# Patient Record
Sex: MALE | Race: WHITE | NOT HISPANIC OR LATINO | Employment: OTHER | URBAN - METROPOLITAN AREA
[De-identification: names, ages, dates, MRNs, and addresses within clinical notes are randomized per-mention and may not be internally consistent; named-entity substitution may affect disease eponyms.]

---

## 2018-06-08 RX ORDER — ASPIRIN 81 MG/1
81 TABLET ORAL DAILY
COMMUNITY
End: 2020-06-22

## 2018-06-08 RX ORDER — RANITIDINE 300 MG/1
300 TABLET ORAL NIGHTLY
COMMUNITY
End: 2020-06-22

## 2018-06-08 RX ORDER — MELOXICAM 15 MG/1
15 TABLET ORAL DAILY
COMMUNITY
End: 2018-06-09 | Stop reason: CLARIF

## 2018-06-08 RX ORDER — LISINOPRIL AND HYDROCHLOROTHIAZIDE 20; 12.5 MG/1; MG/1
1 TABLET ORAL 2 TIMES DAILY
COMMUNITY
End: 2022-05-05 | Stop reason: SDUPTHER

## 2018-06-09 ENCOUNTER — OFFICE VISIT (OUTPATIENT)
Dept: NEUROSURGERY | Facility: CLINIC | Age: 63
End: 2018-06-09

## 2018-06-09 VITALS
DIASTOLIC BLOOD PRESSURE: 80 MMHG | HEIGHT: 73 IN | TEMPERATURE: 98.3 F | SYSTOLIC BLOOD PRESSURE: 110 MMHG | BODY MASS INDEX: 33.53 KG/M2 | WEIGHT: 253 LBS

## 2018-06-09 DIAGNOSIS — M51.36 DEGENERATIVE DISC DISEASE, LUMBAR: Primary | ICD-10-CM

## 2018-06-09 DIAGNOSIS — R20.0 NUMBNESS AND TINGLING: ICD-10-CM

## 2018-06-09 DIAGNOSIS — M50.30 DEGENERATIVE DISC DISEASE, CERVICAL: ICD-10-CM

## 2018-06-09 DIAGNOSIS — M51.26 HERNIATED LUMBAR INTERVERTEBRAL DISC: ICD-10-CM

## 2018-06-09 DIAGNOSIS — R20.2 NUMBNESS AND TINGLING: ICD-10-CM

## 2018-06-09 PROBLEM — M51.369 DEGENERATIVE DISC DISEASE, LUMBAR: Status: ACTIVE | Noted: 2018-06-09

## 2018-06-09 PROCEDURE — 99243 OFF/OP CNSLTJ NEW/EST LOW 30: CPT | Performed by: NEUROLOGICAL SURGERY

## 2018-06-09 RX ORDER — NABUMETONE 750 MG/1
750 TABLET, FILM COATED ORAL 2 TIMES DAILY
Qty: 60 TABLET | Refills: 0 | Status: SHIPPED | OUTPATIENT
Start: 2018-06-09 | End: 2020-06-22

## 2018-06-09 RX ORDER — METHOCARBAMOL 750 MG/1
750 TABLET, FILM COATED ORAL NIGHTLY
Qty: 30 TABLET | Refills: 0 | Status: SHIPPED | OUTPATIENT
Start: 2018-06-09 | End: 2020-06-22

## 2018-06-09 NOTE — PATIENT INSTRUCTIONS
After seeing Dr. Terry for injections and attending physical therapy, call Dr. Quiros on a Monday or Tuesday with an update.   Ask for Zahida,  and leave a message for  Dr. Quiros.  He will call you back at the end of the day as soon as he can.     [phone]

## 2018-06-09 NOTE — PROGRESS NOTES
Behzad [facility]  1955  [id number]      Chief Complaint   Patient presents with   • Back Pain       HISTORY OF PRESENT ILLNESS:  [This is a 62-year-old male seen with a chief complaint of numbness, weakness in his legs; numbness rating of both R May of 5 months duration.  He has a rather protracted history of chronic low back pain however his symptoms have worsened in the past 5 months.  They have been refractory to chiropractic shin.  Lumbar MRIs been performed is referred for neurosurgical consultation.  The symptoms appear to be a bit worse on his left than the right of the lower extremity.  The numbness and tingling involving both of his upper extremity primarily in the distribution of the ulnar nerve. ]    Past Medical History:   Diagnosis Date   • Bronchitis    • Melanoma        Past Surgical History:   Procedure Laterality Date   • SKIN CANCER EXCISION         History reviewed. No pertinent family history.    Social History     Social History   • Marital status:      Spouse name: N/A   • Number of children: N/A   • Years of education: N/A     Occupational History   • Not on file.     Social History Main Topics   • Smoking status: Never Smoker   • Smokeless tobacco: Not on file   • Alcohol use Yes   • Drug use: No   • Sexual activity: Defer     Other Topics Concern   • Not on file     Social History Narrative   • No narrative on file       Allergies   Allergen Reactions   • Protonix [Pantoprazole] Rash   • Sulfa Antibiotics Rash         Current Outpatient Prescriptions:   •  aspirin 81 MG EC tablet, Take 81 mg by mouth Daily., Disp: , Rfl:   •  fluticasone (VERAMYST) 27.5 MCG/SPRAY nasal spray, 2 sprays into each nostril Daily., Disp: , Rfl:   •  lisinopril-hydrochlorothiazide (PRINZIDE,ZESTORETIC) 20-12.5 MG per tablet, Take 1 tablet by mouth Daily., Disp: , Rfl:   •  meloxicam (MOBIC) 15 MG tablet, Take 15 mg by mouth Daily., Disp: , Rfl:   •  raNITIdine (ZANTAC) 300 MG tablet, Take 300 mg by  "mouth Every Night., Disp: , Rfl:     Review of Systems   Constitutional: Positive for activity change. Negative for appetite change, chills, diaphoresis, fatigue, fever and unexpected weight change.   HENT: Negative for congestion, dental problem, drooling, ear discharge, ear pain, facial swelling, hearing loss, mouth sores, nosebleeds, postnasal drip, rhinorrhea, sinus pressure, sneezing, sore throat, tinnitus, trouble swallowing and voice change.    Eyes: Negative for photophobia, pain, discharge, redness, itching and visual disturbance.   Respiratory: Negative for apnea, cough, choking, chest tightness, shortness of breath, wheezing and stridor.    Cardiovascular: Negative for chest pain, palpitations and leg swelling.   Gastrointestinal: Negative for abdominal distention, abdominal pain, anal bleeding, blood in stool, constipation, diarrhea, nausea, rectal pain and vomiting.   Musculoskeletal: Positive for back pain and gait problem. Negative for arthralgias, joint swelling, myalgias, neck pain and neck stiffness.   Skin: Negative for color change, pallor, rash and wound.   Allergic/Immunologic: Negative for environmental allergies, food allergies and immunocompromised state.   Neurological: Positive for weakness and numbness. Negative for dizziness, tremors, seizures, syncope, facial asymmetry, speech difficulty, light-headedness and headaches.   Hematological: Negative for adenopathy. Does not bruise/bleed easily.   Psychiatric/Behavioral: Negative for agitation, behavioral problems, confusion, decreased concentration, dysphoric mood, self-injury, sleep disturbance and suicidal ideas. The patient is not nervous/anxious and is not hyperactive.        Vitals:    06/09/18 0921   BP: 110/80   Temp: 98.3 °F (36.8 °C)   Weight: 115 kg (253 lb)   Height: 185.4 cm (73\")       Neurological Examination:    Mental status/speech: The patient is alert and oriented.  Speech is clear without aphysia or dysarthria.  No overt " cognitive deficits.    Cranial nerve examination:    Olfaction: Smell is intact.  Vision: Vision is intact without visual field abnormalities.  Funduscopic examination is normal.  No pupillary irregularity.  Ocular motor examination: The extraocular muscles are intact.  There is no diplopia.  The pupil is round and reactive to both light and accommodation.  There is no nystagmus.  Facial movement/sensation: There is no facial weakness.  Sensation is intact in the first, second, and third divisions of the trigeminal nerve.  The corneal reflex is intact.  Auditory: Hearing is intact to finger rub bilaterally.  Cranial nerves IX, X, XI, XII: Phonation is normal.  No dysphagia.  Tongue is protruded in the midline without atrophy.  The gag reflex is intact.  Shoulder shrug is normal.    Musculoligamentous ligamentous examination: He has limitation or range of motion of the cervical and lumbar area.  Straight leg raising, [place] again Derek's test are negative.  His strength is intact without weakness or sensory loss.  The left patellar reflex is diminished as compared to the right.  There is no Babinski Kennedy or clonus.  He has negative Tinel at both elbows.  He is hyperreflexic in the upper extremities.    Medical Decision Making:     Diagnostic Data Set:  Lumbar MRI shows what appears to be an extruded disc herniation at L3-L4 on the left side.      Assessment:  Symptomatic disc herniation L3-L4, left.          Recommendations:  There are 2 issues at hand.  The first being that of back and left leg pain which I believe is secondary to the disc herniation noted at L3-L4.  I would recommend physical therapy and epidural steroid injection for this to see if we can get this better without surgical intervention.  I have also given him a prescription of Relafen 750 mg twice a day and Robaxin 750 mg at night.  The second issue is that of pain in the cervical region with paresthesia in both of his upper extremities.  His  symptoms certainly sound like he has nerve root entrapment.  I have ordered cervical MRI as well as EMG/NCV of both upper extremities.  I will see him on the same day.  I do not believe that the issue has in the lumbar area provide any insight into those symptoms involved in his neck which I think are completely separate.        I greatly appreciate the opportunity to see and evaluate this individual.  If you have questions or concerns regarding issues that I may have overlooked please call me at any time: [phone].  Jareth Quiros M.D.  [facility]  [address] [place] [address]    Scribed for Saran Quiros MD by Catherine Howard CMA. 6/9/2018  9:46 AM     I have read and concur with the information provided by the scribe.  Saran Quiros MD

## 2018-06-21 ENCOUNTER — OFFICE VISIT (OUTPATIENT)
Dept: NEUROSURGERY | Facility: CLINIC | Age: 63
End: 2018-06-21

## 2018-06-21 ENCOUNTER — HOSPITAL ENCOUNTER (OUTPATIENT)
Dept: MRI IMAGING | Facility: HOSPITAL | Age: 63
Discharge: HOME | End: 2018-06-21
Attending: NEUROLOGICAL SURGERY | Admitting: NEUROLOGICAL SURGERY

## 2018-06-21 VITALS
SYSTOLIC BLOOD PRESSURE: 140 MMHG | BODY MASS INDEX: 34.01 KG/M2 | HEIGHT: 73 IN | WEIGHT: 256.6 LBS | DIASTOLIC BLOOD PRESSURE: 90 MMHG | TEMPERATURE: 97.4 F

## 2018-06-21 DIAGNOSIS — G56.03 BILATERAL CARPAL TUNNEL SYNDROME: Primary | ICD-10-CM

## 2018-06-21 DIAGNOSIS — R20.2 NUMBNESS AND TINGLING: ICD-10-CM

## 2018-06-21 DIAGNOSIS — R20.0 NUMBNESS AND TINGLING: ICD-10-CM

## 2018-06-21 DIAGNOSIS — M50.30 BULGING OF CERVICAL INTERVERTEBRAL DISC: ICD-10-CM

## 2018-06-21 PROCEDURE — 99213 OFFICE O/P EST LOW 20 MIN: CPT | Performed by: NEUROLOGICAL SURGERY

## 2018-06-21 PROCEDURE — 72156 MRI NECK SPINE W/O & W/DYE: CPT

## 2018-06-21 PROCEDURE — 0 GADOBENATE DIMEGLUMINE 529 MG/ML SOLUTION: Performed by: NEUROLOGICAL SURGERY

## 2018-06-21 PROCEDURE — 82565 ASSAY OF CREATININE: CPT

## 2018-06-21 PROCEDURE — A9577 INJ MULTIHANCE: HCPCS | Performed by: NEUROLOGICAL SURGERY

## 2018-06-21 RX ADMIN — GADOBENATE DIMEGLUMINE 20 ML: 529 INJECTION, SOLUTION INTRAVENOUS at 09:45

## 2018-06-21 NOTE — PROGRESS NOTES
Behzad TOURE [facility]  1955  [id number]                       CURRENT WORKING DIAGNOSIS:  [Cervical spondylosis ]         MEDICAL HISTORY SINCE LAST ENCOUNTER:  [This 62-year-old male has tingling and paresthesia in both of his hands but also has neck pain.  He is being treated the present time for pain of the lumbar area with physical therapy showing somewhat improvement.  Worse now for follow-up discussion of his studies that were prompted as a result of his initial encounter. ]           Past Medical History:   Diagnosis Date   • Bronchitis    • Melanoma               Past Surgical History:   Procedure Laterality Date   • SKIN CANCER EXCISION              History reviewed. No pertinent family history.           Social History     Social History   • Marital status:      Spouse name: N/A   • Number of children: N/A   • Years of education: N/A     Occupational History   • Not on file.     Social History Main Topics   • Smoking status: Never Smoker   • Smokeless tobacco: Not on file   • Alcohol use Yes   • Drug use: No   • Sexual activity: Defer     Other Topics Concern   • Not on file     Social History Narrative   • No narrative on file              Allergies   Allergen Reactions   • Protonix [Pantoprazole] Rash   • Sulfa Antibiotics Rash              Current Outpatient Prescriptions:   •  aspirin 81 MG EC tablet, Take 81 mg by mouth Daily., Disp: , Rfl:   •  fluticasone (VERAMYST) 27.5 MCG/SPRAY nasal spray, 2 sprays into each nostril Daily., Disp: , Rfl:   •  lisinopril-hydrochlorothiazide (PRINZIDE,ZESTORETIC) 20-12.5 MG per tablet, Take 1 tablet by mouth Daily., Disp: , Rfl:   •  methocarbamol (ROBAXIN) 750 MG tablet, Take 1 tablet by mouth Every Night., Disp: 30 tablet, Rfl: 0  •  nabumetone (RELAFEN) 750 MG tablet, Take 1 tablet by mouth 2 (Two) Times a Day., Disp: 60 tablet, Rfl: 0  •  raNITIdine (ZANTAC) 300 MG tablet, Take 300 mg by mouth Every Night., Disp: , Rfl:   No current  LMTCB see telephone encounter. ------    Notes Recorded by Juaquin Bamberger, MD on 7/1/2017 at 12:35 PM CDT  Notify pt; her labs shows he a1c at 7.8 so she is now diabetic. Have pt ffup with me in clinic to further discuss treatment.   Her thyroid test facility-administered medications for this visit.          Review of Systems   Constitutional: Positive for activity change. Negative for appetite change, chills, diaphoresis, fatigue, fever and unexpected weight change.   HENT: Negative for congestion, dental problem, drooling, ear discharge, ear pain, facial swelling, hearing loss, mouth sores, nosebleeds, postnasal drip, rhinorrhea, sinus pain, sneezing, sore throat, tinnitus, trouble swallowing and voice change.    Eyes: Negative for photophobia, pain, discharge, redness, itching and visual disturbance.   Respiratory: Negative for apnea, cough, choking, chest tightness, shortness of breath, wheezing and stridor.    Cardiovascular: Negative for chest pain, palpitations and leg swelling.   Gastrointestinal: Negative for abdominal distention, abdominal pain, anal bleeding, blood in stool, constipation, diarrhea, nausea, rectal pain and vomiting.   Endocrine: Negative for cold intolerance, heat intolerance, polydipsia, polyphagia and polyuria.   Genitourinary: Negative for decreased urine volume, difficulty urinating, discharge, dysuria, enuresis, flank pain, frequency, genital sores, hematuria, penile pain, penile swelling, scrotal swelling, testicular pain and urgency.   Musculoskeletal: Positive for back pain and gait problem. Negative for arthralgias, joint swelling, myalgias, neck pain and neck stiffness.   Skin: Negative for color change, pallor, rash and wound.   Allergic/Immunologic: Negative for environmental allergies, food allergies and immunocompromised state.   Neurological: Positive for weakness and numbness. Negative for dizziness, tremors, seizures, syncope, facial asymmetry, speech difficulty, light-headedness and headaches.   Hematological: Negative for adenopathy. Does not bruise/bleed easily.   Psychiatric/Behavioral: Negative for agitation, behavioral problems, confusion, decreased concentration, dysphoric mood, hallucinations, self-injury,  "sleep disturbance and suicidal ideas. The patient is not nervous/anxious and is not hyperactive.              There were no vitals filed for this visit.            EXAMINATION: Diminished range of motion of cervical spine without weakness sensory loss or reflex asymmetry.            MEDICAL DECISION MAKING: The EMG and NCV shows advanced and severe median neuropathy at the left wrist more so than the right.  The EMG is apparently normal showing no evidence of radiculopathy.  However, the MRI shows disc protrusion with bilateral deviation at C5-C6.           ASSESSMENT/DISPOSITION: Cervical spondylosis; bilateral median neuropathy both diagnostic possibilities to explain his symptoms.  I've suggested he continue with his physical therapy and he will call me afterward.  He is doing \"okay\" I would leave him alone to do nothing else.  If he is still symptomatic I think we would have to decompress the right median nerve at the wrist prior to anterior cervical decompression and fusion.  EMG/NCV is very convincing.  I will follow through this, however, and keep you informed.              I APPRECIATE THE OPPORTUNITY OF THIS REFERRAL. PLEASE CALL IF ANY       QUESTIONS [phone]    Scribed for Saran Quiros MD by Catherine Howard CMA. 6/21/2018  1:53 PM    I have read and concur with the information provided by the scribe.  Saran Quiros MD    "

## 2018-06-21 NOTE — PATIENT INSTRUCTIONS
In 2-3 weeks, call Dr. Quiros on a Monday or Tuesday with an update.   Ask for Zahida,  and leave a message for  Dr. Quiros.  He will call you back at the end of the day as soon as he can.     [phone]

## 2018-06-24 LAB — CREAT BLDA-MCNC: 1.1 MG/DL (ref 0.6–1.3)

## 2018-07-26 ENCOUNTER — TELEPHONE (OUTPATIENT)
Dept: NEUROSURGERY | Facility: CLINIC | Age: 63
End: 2018-07-26

## 2018-07-26 NOTE — TELEPHONE ENCOUNTER
----- Message from Zahida Tatum sent at 7/26/2018  1:01 PM EDT -----  Contact: [phone]  PATIENT CALLING TO REPORT ON HIS CONDITION.  ARMS TINGLING IS BETTER; LUMBAR IS THE SAME.  SCHEDULED FOR AN INJECTION 8/1/18.

## 2018-08-07 ENCOUNTER — TELEPHONE (OUTPATIENT)
Dept: NEUROSURGERY | Facility: CLINIC | Age: 63
End: 2018-08-07

## 2018-08-07 NOTE — TELEPHONE ENCOUNTER
----- Message from Zahida Tatum sent at 8/6/2018 10:35 AM EDT -----  Contact: [phone]  PATIENT CALLING TO REPORT ON HIS CONDITION.  STATES HE HAS SEEN DR. PAYNE, BUT HAS NOT HAD INJECTION AS OF YET.  ALSO, WOULD LIKE TO SPEAK WITH YOU CONCERNING CARPAL TUNNEL SURGERY.

## 2018-08-20 ENCOUNTER — TELEPHONE (OUTPATIENT)
Dept: NEUROSURGERY | Facility: CLINIC | Age: 63
End: 2018-08-20

## 2018-08-20 NOTE — TELEPHONE ENCOUNTER
----- Message from Zahida Tatum sent at 8/20/2018 11:53 AM EDT -----  Contact: [phone]  PATIENT CALLING TO REPORT ON HIS CONDITION.  STATES HE HAD INJECTION 8/8/18 AND IT HELPED A GREAT DEAL FIRST 4 DAYS, BUT NOW NOT AS MUCH.  STILL IN PT.  AND FEELS IT IS HELPING.

## 2019-01-14 ENCOUNTER — TELEPHONE (OUTPATIENT)
Dept: NEUROSURGERY | Facility: CLINIC | Age: 64
End: 2019-01-14

## 2019-01-14 NOTE — TELEPHONE ENCOUNTER
----- Message from Zahida Tatum sent at 1/14/2019 12:42 PM EST -----  Contact: [phone]  PATIENT CALLING TO REPORT ON HIS CONDITION.  STATES HE HAS HAD HIS 2ND EPIDURAL INJECTION IN November AND IT SEEMS TO HAVE HELPED HIM.  DID HAVE SOME PROBLEMS INBETWEEN INJECTIONS WITH NUMBNESS AND BURNING IN HIS LEFT KNEE ON THE SIDE.

## 2019-09-20 ENCOUNTER — TRANSCRIBE ORDERS (OUTPATIENT)
Dept: PULMONOLOGY | Facility: HOSPITAL | Age: 64
End: 2019-09-20

## 2019-09-20 DIAGNOSIS — G47.33 OBSTRUCTIVE SLEEP APNEA (ADULT) (PEDIATRIC): Primary | ICD-10-CM

## 2019-10-11 ENCOUNTER — HOSPITAL ENCOUNTER (OUTPATIENT)
Dept: SLEEP MEDICINE | Facility: HOSPITAL | Age: 64
Discharge: HOME | End: 2019-10-11
Admitting: INTERNAL MEDICINE

## 2019-10-11 VITALS
SYSTOLIC BLOOD PRESSURE: 142 MMHG | WEIGHT: 255.6 LBS | HEIGHT: 73 IN | DIASTOLIC BLOOD PRESSURE: 67 MMHG | BODY MASS INDEX: 33.88 KG/M2 | HEART RATE: 64 BPM | OXYGEN SATURATION: 96 %

## 2019-10-11 DIAGNOSIS — G47.33 OBSTRUCTIVE SLEEP APNEA (ADULT) (PEDIATRIC): ICD-10-CM

## 2019-10-11 PROCEDURE — 95806 SLEEP STUDY UNATT&RESP EFFT: CPT

## 2019-10-11 RX ORDER — ATORVASTATIN CALCIUM 10 MG/1
20 TABLET, FILM COATED ORAL NIGHTLY
COMMUNITY
End: 2021-02-25

## 2020-05-20 ENCOUNTER — TRANSCRIBE ORDERS (OUTPATIENT)
Dept: ADMINISTRATIVE | Facility: HOSPITAL | Age: 65
End: 2020-05-20

## 2020-05-20 ENCOUNTER — HOSPITAL ENCOUNTER (OUTPATIENT)
Dept: GENERAL RADIOLOGY | Facility: HOSPITAL | Age: 65
Discharge: HOME | End: 2020-05-20
Admitting: ANESTHESIOLOGY

## 2020-05-20 DIAGNOSIS — M43.10 SPONDYLOLISTHESIS, UNSPECIFIED SPINAL REGION: Primary | ICD-10-CM

## 2020-05-20 DIAGNOSIS — M43.10 SPONDYLOLISTHESIS, UNSPECIFIED SPINAL REGION: ICD-10-CM

## 2020-05-20 PROCEDURE — 72120 X-RAY BEND ONLY L-S SPINE: CPT

## 2020-06-22 ENCOUNTER — OFFICE VISIT (OUTPATIENT)
Dept: NEUROSURGERY | Facility: CLINIC | Age: 65
End: 2020-06-22

## 2020-06-22 VITALS
BODY MASS INDEX: 34.06 KG/M2 | TEMPERATURE: 97.3 F | SYSTOLIC BLOOD PRESSURE: 162 MMHG | WEIGHT: 257 LBS | DIASTOLIC BLOOD PRESSURE: 78 MMHG | HEIGHT: 73 IN

## 2020-06-22 DIAGNOSIS — M48.062 SPINAL STENOSIS OF LUMBAR REGION WITH NEUROGENIC CLAUDICATION: ICD-10-CM

## 2020-06-22 DIAGNOSIS — M43.16 SPONDYLOLISTHESIS OF LUMBAR REGION: Primary | ICD-10-CM

## 2020-06-22 PROCEDURE — 99213 OFFICE O/P EST LOW 20 MIN: CPT | Performed by: NEUROLOGICAL SURGERY

## 2020-06-22 RX ORDER — FAMOTIDINE 20 MG/1
20 TABLET, FILM COATED ORAL 2 TIMES DAILY
COMMUNITY
End: 2021-02-25

## 2020-06-22 NOTE — PROGRESS NOTES
Behzad TOURE [facility]  1955  [id number]                        CHIEF COMPLAINT: Back and leg pain         MEDICAL HISTORY SINCE LAST ENCOUNTER: This is a 64-year-old male who was last seen June 2018 with symptoms of cervical and lumbar generative disc disease.  He has responded very well to epidural steroid injections until more recently at which time these have been of no benefit.  He continues to have a significant amount of pain involving both lower extremities with the left worse than the right.  He has no bowel bladder or motor dysfunction.  He does have increasing symptoms with ambulation and activity.  Lumbar MRI and EMG/NCV were performed and he was referred for neurosurgical reevaluation given that he has failing to respond to continued IVA           Past Medical History:   Diagnosis Date   • Bronchitis    • Melanoma (CMS/HCC)               Past Surgical History:   Procedure Laterality Date   • SKIN CANCER EXCISION              No family history on file.           Social History     Socioeconomic History   • Marital status:      Spouse name: Not on file   • Number of children: Not on file   • Years of education: Not on file   • Highest education level: Not on file   Tobacco Use   • Smoking status: Never Smoker   Substance and Sexual Activity   • Alcohol use: Yes   • Drug use: No   • Sexual activity: Defer              Allergies   Allergen Reactions   • Bee Venom Anaphylaxis     Takes shots to prevent   • Protonix [Pantoprazole] Rash   • Sulfa Antibiotics Rash              Current Outpatient Medications:   •  atorvastatin (LIPITOR) 10 MG tablet, Take 10 mg by mouth Daily., Disp: , Rfl:   •  famotidine (PEPCID) 20 MG tablet, Take 20 mg by mouth 2 (Two) Times a Day., Disp: , Rfl:   •  fluticasone (VERAMYST) 27.5 MCG/SPRAY nasal spray, 2 sprays into each nostril Daily., Disp: , Rfl:   •  lisinopril-hydrochlorothiazide (PRINZIDE,ZESTORETIC) 20-12.5 MG per tablet, Take 1 tablet by mouth Daily., Disp: ,  Rfl:          Review of Systems   Constitutional: Negative for activity change, appetite change, chills, diaphoresis, fatigue, fever and unexpected weight change.   HENT: Negative for congestion, dental problem, drooling, ear discharge, ear pain, facial swelling, hearing loss, mouth sores, nosebleeds, postnasal drip, rhinorrhea, sinus pressure, sneezing, sore throat, tinnitus, trouble swallowing and voice change.    Eyes: Negative for photophobia, pain, discharge, redness, itching and visual disturbance.   Respiratory: Negative for apnea, cough, choking, chest tightness, shortness of breath, wheezing and stridor.    Cardiovascular: Negative for chest pain, palpitations and leg swelling.   Gastrointestinal: Negative for abdominal distention, abdominal pain, anal bleeding, blood in stool, constipation, diarrhea, nausea, rectal pain and vomiting.   Endocrine: Negative for cold intolerance, heat intolerance, polydipsia, polyphagia and polyuria.   Genitourinary: Negative for decreased urine volume, difficulty urinating, dysuria, enuresis, flank pain, frequency, genital sores, hematuria and urgency.   Musculoskeletal: Positive for back pain and myalgias. Negative for arthralgias, gait problem, joint swelling, neck pain and neck stiffness.   Skin: Negative for color change, pallor, rash and wound.   Allergic/Immunologic: Negative for environmental allergies, food allergies and immunocompromised state.   Neurological: Negative for dizziness, tremors, seizures, syncope, facial asymmetry, speech difficulty, weakness, light-headedness, numbness and headaches.   Hematological: Negative for adenopathy. Does not bruise/bleed easily.   Psychiatric/Behavioral: Negative for agitation, behavioral problems, confusion, decreased concentration, dysphoric mood, hallucinations, self-injury, sleep disturbance and suicidal ideas. The patient is not nervous/anxious and is not hyperactive.                Vitals:    06/22/20 1355   BP: 162/78  "  BP Location: Right arm   Patient Position: Sitting   Temp: 97.3 °F (36.3 °C)   TempSrc: Temporal   Weight: 117 kg (257 lb)   Height: 185.4 cm (73\")               EXAMINATION: He has limited range of motion of the lumbar spine.  High BMI 33.9, weight 257 pounds.  He has no focal weakness or sensory loss.  The deep tendon reflexes are hypoactive.  His gait is normal.  No Babinski Kennedy or clonus            MEDICAL DECISION MAKING: The MRI shows what appears to be an anterolisthesis L3-L4 with this moderate to severe spinal stenosis.  He also has mild disease at L2-3.  There is a synovial cyst formation lateral to the facet complex without compromise of the canal, however           ASSESSMENT/DISPOSITION: The symptoms that he has are surgical in nature and would necessitate PLIF L3-4.  I reviewed this with him in detail.  The decision to proceed with surgery however would be predicated on his discomfort and pain.  I have advised him to proceed with surgical intervention if, and only if he is at a decision where his discomfort is no longer tolerable and he wishes to proceed with correction.  He is aware that he does have adjacent level degenerative change which may become symptomatic in the future should PLIF be required and performed at L3-L4.  I shall keep you informed and thank you for allow me to see him once again              I APPRECIATE THE OPPORTUNITY OF THIS REFERRAL. PLEASE CALL IF ANY       QUESTIONS [phone]        I have received verbal consent from the patient to receive care via telehealth.   "

## 2020-12-02 ENCOUNTER — TELEPHONE (OUTPATIENT)
Dept: NEUROSURGERY | Facility: CLINIC | Age: 65
End: 2020-12-02

## 2020-12-02 DIAGNOSIS — M43.16 SPONDYLOLISTHESIS OF LUMBAR REGION: Primary | ICD-10-CM

## 2020-12-02 DIAGNOSIS — M48.062 SPINAL STENOSIS OF LUMBAR REGION WITH NEUROGENIC CLAUDICATION: ICD-10-CM

## 2020-12-02 NOTE — TELEPHONE ENCOUNTER
Caller: KEEGAN BURT    Relationship to patient: PT    Best call back number: [phone]    Chief complaint: PT FELL FROM A SEATED POSITION OFF A KITCHEN CHAIR AT THE BEGINNING OF November AND LANDED ON HIS SEAT. SINCE THEN, HE REPORTS WORSENING LOW BACK PAIN, RIGHT LEG NUMBNESS, AND WEAKNESS, TINGLING IN BOTTOMS OF FEET. NO LOSS OF BLADDER/BOWEL CONTROL.     Type of visit: FOLLOW UP    Requested date: ASAP    If rescheduling, when is the original appointment: N/A    Additional notes: PT STATES HE USUALLY COMPLETES SAME DAY MRI AS HIS APPT WITH DR CHEN AND WONDERS IF IMAGING WILL BE NEEDED BEFORE HE SEES THE DR DUE TO POSSIBLE NEW INJURY?     LAST OV NOTE DOES NOT SPECIFY FOLLOW UP. PLEASE ADVISE ON SCHEDULING AND IF IMAGING NEEDED.    THANK YOU.

## 2020-12-02 NOTE — TELEPHONE ENCOUNTER
Please call in steroid pack for patient if he is not diabetic and tell him we are making arrangements,Karin will call him.

## 2020-12-02 NOTE — TELEPHONE ENCOUNTER
Rx called into pharmacy.  Pt notified,  He is aware our office will call him to schedule the MRI and f/u soon.

## 2020-12-07 ENCOUNTER — HOSPITAL ENCOUNTER (OUTPATIENT)
Dept: MRI IMAGING | Facility: HOSPITAL | Age: 65
Discharge: HOME | End: 2020-12-07
Admitting: PHYSICIAN ASSISTANT

## 2020-12-07 ENCOUNTER — OFFICE VISIT (OUTPATIENT)
Dept: NEUROSURGERY | Facility: CLINIC | Age: 65
End: 2020-12-07

## 2020-12-07 VITALS
WEIGHT: 246.8 LBS | BODY MASS INDEX: 32.71 KG/M2 | SYSTOLIC BLOOD PRESSURE: 150 MMHG | TEMPERATURE: 97.3 F | HEIGHT: 73 IN | DIASTOLIC BLOOD PRESSURE: 84 MMHG

## 2020-12-07 DIAGNOSIS — M48.062 SPINAL STENOSIS OF LUMBAR REGION WITH NEUROGENIC CLAUDICATION: Primary | ICD-10-CM

## 2020-12-07 DIAGNOSIS — M43.16 SPONDYLOLISTHESIS OF LUMBAR REGION: ICD-10-CM

## 2020-12-07 DIAGNOSIS — M48.062 SPINAL STENOSIS OF LUMBAR REGION WITH NEUROGENIC CLAUDICATION: ICD-10-CM

## 2020-12-07 PROCEDURE — 72148 MRI LUMBAR SPINE W/O DYE: CPT

## 2020-12-07 PROCEDURE — 99213 OFFICE O/P EST LOW 20 MIN: CPT | Performed by: NEUROLOGICAL SURGERY

## 2020-12-07 NOTE — PROGRESS NOTES
Behzad TOURE [facility]  1955  [id number]                        CHIEF COMPLAINT: Back and bilateral leg pain with numbness         MEDICAL HISTORY SINCE LAST ENCOUNTER: This is a 65-year-old male we have been following with degenerative disc disease.  In the past he has had epidural steroid injections which have helped and he is seemingly doing well.  His last encounter prior to this was in June.  He was having pain in his lower extremities but worse on the left than the right.  He recently fell with the onset of numbness in his left leg and foot with paresthesia on the right side.  No bowel or bladder dysfunction.  Still having back pain.    He has a history of carpal tunnel syndrome which has been documented.  Surgery has been deferred although symptoms have worsened as well.  He is here to discuss both carpal tunnel and his back problems with a more recent lumbar MRI           Past Medical History:   Diagnosis Date   • Bronchitis    • Melanoma (CMS/HCC)               Past Surgical History:   Procedure Laterality Date   • SKIN CANCER EXCISION              No family history on file.           Social History     Socioeconomic History   • Marital status:      Spouse name: Not on file   • Number of children: Not on file   • Years of education: Not on file   • Highest education level: Not on file   Tobacco Use   • Smoking status: Never Smoker   Substance and Sexual Activity   • Alcohol use: Yes   • Drug use: No   • Sexual activity: Defer              Allergies   Allergen Reactions   • Bee Venom Anaphylaxis     Takes shots to prevent   • Protonix [Pantoprazole] Rash   • Sulfa Antibiotics Rash              Current Outpatient Medications:   •  atorvastatin (LIPITOR) 10 MG tablet, Take 10 mg by mouth Daily., Disp: , Rfl:   •  famotidine (PEPCID) 20 MG tablet, Take 20 mg by mouth 2 (Two) Times a Day., Disp: , Rfl:   •  fluticasone (VERAMYST) 27.5 MCG/SPRAY nasal spray, 2 sprays into each nostril Daily., Disp: , Rfl:    •  lisinopril-hydrochlorothiazide (PRINZIDE,ZESTORETIC) 20-12.5 MG per tablet, Take 1 tablet by mouth Daily., Disp: , Rfl:          Review of Systems   Constitutional: Negative for activity change, appetite change, chills, diaphoresis, fatigue, fever and unexpected weight change.   HENT: Negative for congestion, dental problem, drooling, ear discharge, ear pain, facial swelling, hearing loss, mouth sores, nosebleeds, postnasal drip, rhinorrhea, sinus pressure, sneezing, sore throat, tinnitus, trouble swallowing and voice change.    Eyes: Negative for photophobia, pain, discharge, redness, itching and visual disturbance.   Respiratory: Negative for apnea, cough, choking, chest tightness, shortness of breath, wheezing and stridor.    Cardiovascular: Negative for chest pain, palpitations and leg swelling.   Gastrointestinal: Negative for abdominal distention, abdominal pain, anal bleeding, blood in stool, constipation, diarrhea, nausea, rectal pain and vomiting.   Endocrine: Negative for cold intolerance, heat intolerance, polydipsia, polyphagia and polyuria.   Genitourinary: Negative for decreased urine volume, difficulty urinating, dysuria, enuresis, flank pain, frequency, genital sores, hematuria and urgency.   Musculoskeletal: Positive for back pain, neck pain and neck stiffness. Negative for arthralgias, gait problem, joint swelling and myalgias.   Skin: Negative for color change, pallor, rash and wound.   Allergic/Immunologic: Negative for environmental allergies, food allergies and immunocompromised state.   Neurological: Positive for weakness and numbness. Negative for dizziness, tremors, seizures, syncope, facial asymmetry, speech difficulty, light-headedness and headaches.   Hematological: Negative for adenopathy. Does not bruise/bleed easily.   Psychiatric/Behavioral: Negative for agitation, behavioral problems, confusion, decreased concentration, dysphoric mood, hallucinations, self-injury, sleep  "disturbance and suicidal ideas. The patient is not nervous/anxious and is not hyperactive.    All other systems reviewed and are negative.              Vitals:    12/07/20 1308   BP: 150/84   BP Location: Right arm   Patient Position: Sitting   Cuff Size: Adult   Temp: 97.3 °F (36.3 °C)   TempSrc: Infrared   Weight: 112 kg (246 lb 12.8 oz)   Height: 185.4 cm (73\")               EXAMINATION: BMI 32.5; weight 246.  He has limited range of motion lumbar spine.  Straight leg raising, Lasègue and flip test negative.  His strength is intact both in the upper and lower extremities.  Deep tendon reflexes are hypoactive.  His gait is normal.            MEDICAL DECISION MAKING: The lumbar MRI shows multilevel degenerative disc disease.  Particular severe at L3-4 with spinal stenosis.  He also however has some stenotic changes at L4-5 with marked disc space narrowing at L5-S1           ASSESSMENT/DISPOSITION: Multilevel degenerative disc disease is now symptomatic.  He needs further definition prior to consideration of surgery.  Previously we had anticipated PLIF L3-L4 however his symptom complex will merit reconsideration.  We have scheduled myelogram, post milligrams CT scan EMG and NCV of both lower extremities and will see him on the same day.  He also has carpal tunnel symptoms and wishes to pursue that.  We will take that under advisement and discuss all with him at that time.  He will continue Relafen 750 mg twice daily.              I APPRECIATE THE OPPORTUNITY OF THIS REFERRAL. PLEASE CALL IF ANY       QUESTIONS [phone]          "

## 2020-12-18 ENCOUNTER — HOSPITAL ENCOUNTER (OUTPATIENT)
Dept: NEUROLOGY | Facility: HOSPITAL | Age: 65
Discharge: HOME | End: 2020-12-18

## 2020-12-18 ENCOUNTER — HOSPITAL ENCOUNTER (OUTPATIENT)
Dept: CT IMAGING | Facility: HOSPITAL | Age: 65
Discharge: HOME | End: 2020-12-18

## 2020-12-18 ENCOUNTER — HOSPITAL ENCOUNTER (OUTPATIENT)
Dept: GENERAL RADIOLOGY | Facility: HOSPITAL | Age: 65
Discharge: HOME | End: 2020-12-18

## 2020-12-18 VITALS
HEIGHT: 73 IN | SYSTOLIC BLOOD PRESSURE: 144 MMHG | DIASTOLIC BLOOD PRESSURE: 81 MMHG | OXYGEN SATURATION: 98 % | RESPIRATION RATE: 12 BRPM | HEART RATE: 60 BPM | WEIGHT: 243 LBS | TEMPERATURE: 97.1 F | BODY MASS INDEX: 32.2 KG/M2

## 2020-12-18 PROCEDURE — 95912 NRV CNDJ TEST 11-12 STUDIES: CPT

## 2020-12-18 PROCEDURE — 72132 CT LUMBAR SPINE W/DYE: CPT

## 2020-12-18 PROCEDURE — 95910 NRV CNDJ TEST 7-8 STUDIES: CPT

## 2020-12-18 PROCEDURE — 95886 MUSC TEST DONE W/N TEST COMP: CPT

## 2020-12-18 PROCEDURE — 72240 MYELOGRAPHY NECK SPINE: CPT

## 2020-12-18 PROCEDURE — 62284 INJECTION FOR MYELOGRAM: CPT

## 2020-12-18 PROCEDURE — 0 IOPAMIDOL 41 % SOLUTION: Performed by: NEUROLOGICAL SURGERY

## 2020-12-18 PROCEDURE — 62304 MYELOGRAPHY LUMBAR INJECTION: CPT

## 2020-12-18 RX ORDER — DIAZEPAM 5 MG/1
5 TABLET ORAL ONCE
Status: COMPLETED | OUTPATIENT
Start: 2020-12-18 | End: 2020-12-18

## 2020-12-18 RX ORDER — LIDOCAINE HYDROCHLORIDE 10 MG/ML
5 INJECTION, SOLUTION EPIDURAL; INFILTRATION; INTRACAUDAL; PERINEURAL ONCE
Status: COMPLETED | OUTPATIENT
Start: 2020-12-18 | End: 2020-12-18

## 2020-12-18 RX ORDER — NABUMETONE 750 MG/1
750 TABLET, FILM COATED ORAL 2 TIMES DAILY
Status: ON HOLD | COMMUNITY
End: 2021-03-19

## 2020-12-18 RX ADMIN — LIDOCAINE HYDROCHLORIDE 5 ML: 10 INJECTION, SOLUTION EPIDURAL; INFILTRATION; INTRACAUDAL; PERINEURAL at 11:02

## 2020-12-18 RX ADMIN — IOPAMIDOL 20 ML: 408 INJECTION, SOLUTION INTRATHECAL at 11:01

## 2020-12-18 RX ADMIN — DIAZEPAM 5 MG: 5 TABLET ORAL at 08:07

## 2020-12-18 NOTE — POST-PROCEDURE NOTE
Radiology Procedure    Pre-procedure: procedure, risks discussed with patient. Patient indicated understanding and consented to procedure.     Procedure Performed: lumbar myelogram     IV Sedation and/or Anesthesia:  No    Complications: none    Preliminary Findings: pending    Specimen Removed: none    Estimated Blood Loss:  0ml    Post-Procedure Diagnosis: pending    Post-Procedure Plan: ct L spine, encourage fluids, bed rest x 2 hours    Standard Discharge Instructions Given:yes     GABRIELLE Olguin  12/18/20  10:46 EST

## 2020-12-18 NOTE — PROGRESS NOTES
Neurosurgery post myelogram note:    This is a 65-year-old male we have been following with degenerative disc disease.  In the past he has had epidural steroid injections which have helped and he is seemingly doing well.  His last encounter prior to this was in June.  He was having pain in his lower extremities but worse on the left than the right.  He recently fell with the onset of numbness in his left leg and foot with paresthesia on the right side.  No bowel or bladder dysfunction.  Still having back pain.     POST -MYELO CT:    Multilevel lumbar spondylosis, fairly similar to recent  comparison MRI and again most advanced at L3-4 where there is associated  moderate to severe narrowing of the spinal canal, with compression of  the thecal sac and resultant redundancy of the cauda equina nerve roots.  There is also moderate to severe bilateral neuroforaminal narrowing at    Impression:  He has pain in his legs and both lower extremities consistent with a diagnosis of claudication.  Reviewing his lumbar spine x-rays he has a listhesis at L3-4 both mildly anterior and lateral to the left I have suggested weight reduction, facet block and follow-up.  He will call me in 2-3 weeks.  Should surgery be necessitated I think he would require PLIF L3-.-4

## 2020-12-18 NOTE — NURSING NOTE
Pt discharged from ir dept s/p myelogram.  Pt tolerated procedure without complications.  Discharge instructions reviewed with patient and spouse both verbalized understanding.  Pt transported to exit via wheelchair per tech.

## 2020-12-21 ENCOUNTER — TELEPHONE (OUTPATIENT)
Dept: INFUSION THERAPY | Facility: HOSPITAL | Age: 65
End: 2020-12-21

## 2021-01-29 ENCOUNTER — TELEPHONE (OUTPATIENT)
Dept: NEUROSURGERY | Facility: CLINIC | Age: 66
End: 2021-01-29

## 2021-02-25 ENCOUNTER — PREP FOR SURGERY (OUTPATIENT)
Dept: OTHER | Facility: HOSPITAL | Age: 66
End: 2021-02-25

## 2021-02-25 ENCOUNTER — OFFICE VISIT (OUTPATIENT)
Dept: NEUROSURGERY | Facility: CLINIC | Age: 66
End: 2021-02-25

## 2021-02-25 VITALS
HEIGHT: 73 IN | WEIGHT: 254 LBS | SYSTOLIC BLOOD PRESSURE: 130 MMHG | BODY MASS INDEX: 33.66 KG/M2 | DIASTOLIC BLOOD PRESSURE: 70 MMHG | TEMPERATURE: 96.9 F

## 2021-02-25 DIAGNOSIS — M51.36 DEGENERATIVE DISC DISEASE, LUMBAR: Primary | ICD-10-CM

## 2021-02-25 DIAGNOSIS — M48.062 SPINAL STENOSIS OF LUMBAR REGION WITH NEUROGENIC CLAUDICATION: Primary | ICD-10-CM

## 2021-02-25 PROCEDURE — 99213 OFFICE O/P EST LOW 20 MIN: CPT | Performed by: NEUROLOGICAL SURGERY

## 2021-02-25 RX ORDER — ACETAMINOPHEN 325 MG/1
650 TABLET ORAL ONCE
Status: CANCELLED | OUTPATIENT
Start: 2021-02-25 | End: 2021-02-25

## 2021-02-25 RX ORDER — HYDROCODONE BITARTRATE AND ACETAMINOPHEN 7.5; 325 MG/1; MG/1
1 TABLET ORAL ONCE
Status: CANCELLED | OUTPATIENT
Start: 2021-02-25 | End: 2021-02-25

## 2021-02-25 RX ORDER — SODIUM CHLORIDE 0.9 % (FLUSH) 0.9 %
3-10 SYRINGE (ML) INJECTION AS NEEDED
Status: CANCELLED | OUTPATIENT
Start: 2021-02-25

## 2021-02-25 RX ORDER — SODIUM CHLORIDE 0.9 % (FLUSH) 0.9 %
3 SYRINGE (ML) INJECTION EVERY 12 HOURS SCHEDULED
Status: CANCELLED | OUTPATIENT
Start: 2021-02-25

## 2021-02-25 RX ORDER — IBUPROFEN 800 MG/1
1 TABLET ORAL EVERY 8 HOURS
COMMUNITY
Start: 2020-11-10 | End: 2021-03-17

## 2021-02-25 RX ORDER — HYDROCODONE BITARTRATE AND ACETAMINOPHEN 7.5; 325 MG/1; MG/1
1 TABLET ORAL EVERY 6 HOURS PRN
Qty: 45 TABLET | Refills: 0 | Status: SHIPPED | OUTPATIENT
Start: 2021-02-25 | End: 2021-03-17

## 2021-02-25 RX ORDER — ATORVASTATIN CALCIUM 20 MG/1
20 TABLET, FILM COATED ORAL DAILY
COMMUNITY
Start: 2020-12-14 | End: 2022-11-07

## 2021-02-25 RX ORDER — CEFAZOLIN SODIUM 2 G/100ML
2 INJECTION, SOLUTION INTRAVENOUS ONCE
Status: CANCELLED | OUTPATIENT
Start: 2021-02-25 | End: 2021-02-25

## 2021-02-25 RX ORDER — SODIUM CHLORIDE, SODIUM LACTATE, POTASSIUM CHLORIDE, CALCIUM CHLORIDE 600; 310; 30; 20 MG/100ML; MG/100ML; MG/100ML; MG/100ML
100 INJECTION, SOLUTION INTRAVENOUS CONTINUOUS
Status: CANCELLED | OUTPATIENT
Start: 2021-02-25

## 2021-02-25 RX ORDER — FAMOTIDINE 40 MG/1
40 TABLET, FILM COATED ORAL NIGHTLY
COMMUNITY
Start: 2021-01-20 | End: 2022-05-05 | Stop reason: SDUPTHER

## 2021-02-25 RX ORDER — IBUPROFEN 800 MG/1
800 TABLET ORAL ONCE
Status: CANCELLED | OUTPATIENT
Start: 2021-02-25 | End: 2021-02-25

## 2021-02-25 NOTE — PROGRESS NOTES
Behzad TOURE [facility]  1955  [id number]                        CHIEF COMPLAINT: Back and bilateral leg pain         MEDICAL HISTORY SINCE LAST ENCOUNTER: This 65-year-old male reports today for further discussion regarding surgical options.  He has pain in his back which radiates down both lower extremities worse on the left than the right.  Epidural steroid injections have failed to help.  He has numbness in his left foot and leg with paresthesia on the right.  No bowel or bladder dysfunction.           Past Medical History:   Diagnosis Date   • Arthritis    • Bronchitis    • GERD (gastroesophageal reflux disease)    • History of SCC (squamous cell carcinoma) of skin    • Hypertension    • Melanoma (CMS/HCC)    • Melanoma (CMS/HCC)     scalp              Past Surgical History:   Procedure Laterality Date   • SKIN CANCER EXCISION              No family history on file.           Social History     Socioeconomic History   • Marital status:      Spouse name: Not on file   • Number of children: Not on file   • Years of education: Not on file   • Highest education level: Not on file   Tobacco Use   • Smoking status: Never Smoker   Substance and Sexual Activity   • Alcohol use: Yes     Comment: 1 drink a week   • Drug use: No   • Sexual activity: Defer              Allergies   Allergen Reactions   • Bee Venom Anaphylaxis     Takes shots to prevent   • Omeprazole Rash   • Protonix [Pantoprazole] Rash     Any of the omeprazole drugs   • Sulfa Antibiotics Rash     Pt can't remember reaction              Current Outpatient Medications:   •  atorvastatin (LIPITOR) 20 MG tablet, , Disp: , Rfl:   •  famotidine (PEPCID) 40 MG tablet, , Disp: , Rfl:   •  fluticasone (VERAMYST) 27.5 MCG/SPRAY nasal spray, 2 sprays into each nostril Daily., Disp: , Rfl:   •  lisinopril-hydrochlorothiazide (PRINZIDE,ZESTORETIC) 20-12.5 MG per tablet, Take 1 tablet by mouth 2 (two) times a day., Disp: , Rfl:   •  nabumetone (Relafen) 750 MG  tablet, Take 750 mg by mouth 2 (Two) Times a Day., Disp: , Rfl:   •  ibuprofen (ADVIL,MOTRIN) 800 MG tablet, Take 1 tablet by mouth Every 8 (Eight) Hours., Disp: , Rfl:          Review of Systems   Constitutional: Negative for activity change, appetite change, chills, diaphoresis, fatigue, fever and unexpected weight change.   HENT: Negative for congestion, dental problem, drooling, ear discharge, ear pain, facial swelling, hearing loss, mouth sores, nosebleeds, postnasal drip, rhinorrhea, sinus pressure, sneezing, sore throat, tinnitus, trouble swallowing and voice change.    Eyes: Negative for photophobia, pain, discharge, redness, itching and visual disturbance.   Respiratory: Negative for apnea, cough, choking, chest tightness, shortness of breath, wheezing and stridor.    Cardiovascular: Negative for chest pain, palpitations and leg swelling.   Gastrointestinal: Negative for abdominal distention, abdominal pain, anal bleeding, blood in stool, constipation, diarrhea, nausea, rectal pain and vomiting.   Endocrine: Negative for cold intolerance, heat intolerance, polydipsia, polyphagia and polyuria.   Genitourinary: Negative for decreased urine volume, difficulty urinating, dysuria, enuresis, flank pain, frequency, genital sores, hematuria and urgency.   Musculoskeletal: Positive for back pain, neck pain and neck stiffness. Negative for arthralgias, gait problem, joint swelling and myalgias.   Skin: Negative for color change, pallor, rash and wound.   Allergic/Immunologic: Negative for environmental allergies, food allergies and immunocompromised state.   Neurological: Positive for weakness and numbness. Negative for dizziness, tremors, seizures, syncope, facial asymmetry, speech difficulty, light-headedness and headaches.   Hematological: Negative for adenopathy. Does not bruise/bleed easily.   Psychiatric/Behavioral: Negative for agitation, behavioral problems, confusion, decreased concentration, dysphoric mood,  "hallucinations, self-injury, sleep disturbance and suicidal ideas. The patient is not nervous/anxious and is not hyperactive.    All other systems reviewed and are negative.              Vitals:    02/25/21 1301   BP: 130/70   BP Location: Right arm   Patient Position: Sitting   Cuff Size: Adult   Temp: 96.9 °F (36.1 °C)   TempSrc: Infrared   Weight: 115 kg (254 lb)   Height: 185.4 cm (73\")               EXAMINATION: BMI 33.5, 254 pounds.  Limited range of motion of the lumbar spine.  Straight leg raising is negative.  I am unable to find weakness or sensory loss.  Deep tendon reflexes are hypoactive.            MEDICAL DECISION MAKING: Reviewed his diagnostic studies which should have include lumbar myelography, post myelography CT scanning and MRI.  EMG/NCV also been reviewed the studies show the presence of stenosis primarily at L3-L4.  Flexion extension x-rays have not shown any excessive movement these studies collectively show severe narrowing of the left neuroforamen and moderate spinal stenosis primarily at L3-L4.  However he does have disease elsewhere throughout the lumbar spine.  EMG is consistent with an L4 chronic radiculopathy.  Flexion-extension x-rays have shown no inducible movement at L3-4 and L4-L5.           ASSESSMENT/DISPOSITION: Predicated on the focality of compressive elements noted in the CT scan, myelogram and MRI I have recommended a laminectomy of L3 possibly L4.  There is no inducible movement therefore the indication for PLIF are absent at this time.  He is aware however the laminectomies may produce instability in the future for which additional surgery would be necessitated.              I APPRECIATE THE OPPORTUNITY OF THIS REFERRAL. PLEASE CALL IF ANY       QUESTIONS [phone]          "

## 2021-02-25 NOTE — H&P
Behzad TOURE [facility]  1955  [id number]                          CHIEF COMPLAINT: Back and bilateral leg pain          MEDICAL HISTORY SINCE LAST ENCOUNTER: This 65-year-old male reports today for further discussion regarding surgical options.  He has pain in his back which radiates down both lower extremities worse on the left than the right.  Epidural steroid injections have failed to help.  He has numbness in his left foot and leg with paresthesia on the right.  No bowel or bladder dysfunction.            Medical History        Past Medical History:   Diagnosis Date   • Arthritis     • Bronchitis     • GERD (gastroesophageal reflux disease)     • History of SCC (squamous cell carcinoma) of skin     • Hypertension     • Melanoma (CMS/HCC)     • Melanoma (CMS/HCC)       scalp                  Surgical History         Past Surgical History:   Procedure Laterality Date   • SKIN CANCER EXCISION                    No family history on file.            Social History   Social History            Socioeconomic History   • Marital status:        Spouse name: Not on file   • Number of children: Not on file   • Years of education: Not on file   • Highest education level: Not on file   Tobacco Use   • Smoking status: Never Smoker   Substance and Sexual Activity   • Alcohol use: Yes       Comment: 1 drink a week   • Drug use: No   • Sexual activity: Defer                        Allergies   Allergen Reactions   • Bee Venom Anaphylaxis       Takes shots to prevent   • Omeprazole Rash   • Protonix [Pantoprazole] Rash       Any of the omeprazole drugs   • Sulfa Antibiotics Rash       Pt can't remember reaction                Current Outpatient Medications:   •  atorvastatin (LIPITOR) 20 MG tablet, , Disp: , Rfl:   •  famotidine (PEPCID) 40 MG tablet, , Disp: , Rfl:   •  fluticasone (VERAMYST) 27.5 MCG/SPRAY nasal spray, 2 sprays into each nostril Daily., Disp: , Rfl:   •  lisinopril-hydrochlorothiazide  (PRINZIDE,ZESTORETIC) 20-12.5 MG per tablet, Take 1 tablet by mouth 2 (two) times a day., Disp: , Rfl:   •  nabumetone (Relafen) 750 MG tablet, Take 750 mg by mouth 2 (Two) Times a Day., Disp: , Rfl:   •  ibuprofen (ADVIL,MOTRIN) 800 MG tablet, Take 1 tablet by mouth Every 8 (Eight) Hours., Disp: , Rfl:           Review of Systems   Constitutional: Negative for activity change, appetite change, chills, diaphoresis, fatigue, fever and unexpected weight change.   HENT: Negative for congestion, dental problem, drooling, ear discharge, ear pain, facial swelling, hearing loss, mouth sores, nosebleeds, postnasal drip, rhinorrhea, sinus pressure, sneezing, sore throat, tinnitus, trouble swallowing and voice change.    Eyes: Negative for photophobia, pain, discharge, redness, itching and visual disturbance.   Respiratory: Negative for apnea, cough, choking, chest tightness, shortness of breath, wheezing and stridor.    Cardiovascular: Negative for chest pain, palpitations and leg swelling.   Gastrointestinal: Negative for abdominal distention, abdominal pain, anal bleeding, blood in stool, constipation, diarrhea, nausea, rectal pain and vomiting.   Endocrine: Negative for cold intolerance, heat intolerance, polydipsia, polyphagia and polyuria.   Genitourinary: Negative for decreased urine volume, difficulty urinating, dysuria, enuresis, flank pain, frequency, genital sores, hematuria and urgency.   Musculoskeletal: Positive for back pain, neck pain and neck stiffness. Negative for arthralgias, gait problem, joint swelling and myalgias.   Skin: Negative for color change, pallor, rash and wound.   Allergic/Immunologic: Negative for environmental allergies, food allergies and immunocompromised state.   Neurological: Positive for weakness and numbness. Negative for dizziness, tremors, seizures, syncope, facial asymmetry, speech difficulty, light-headedness and headaches.   Hematological: Negative for adenopathy. Does not  "bruise/bleed easily.   Psychiatric/Behavioral: Negative for agitation, behavioral problems, confusion, decreased concentration, dysphoric mood, hallucinations, self-injury, sleep disturbance and suicidal ideas. The patient is not nervous/anxious and is not hyperactive.    All other systems reviewed and are negative.                Vitals       Vitals:     02/25/21 1301   BP: 130/70   BP Location: Right arm   Patient Position: Sitting   Cuff Size: Adult   Temp: 96.9 °F (36.1 °C)   TempSrc: Infrared   Weight: 115 kg (254 lb)   Height: 185.4 cm (73\")                    EXAMINATION: BMI 33.5, 254 pounds.  Limited range of motion of the lumbar spine.  Straight leg raising is negative.  I am unable to find weakness or sensory loss.  Deep tendon reflexes are hypoactive.              MEDICAL DECISION MAKING: Reviewed his diagnostic studies which should have include lumbar myelography, post myelography CT scanning and MRI.  EMG/NCV also been reviewed the studies show the presence of stenosis primarily at L3-L4.  Flexion extension x-rays have not shown any excessive movement these studies collectively show severe narrowing of the left neuroforamen and moderate spinal stenosis primarily at L3-L4.  However he does have disease elsewhere throughout the lumbar spine.  EMG is consistent with an L4 chronic radiculopathy.  Flexion-extension x-rays have shown no inducible movement at L3-4 and L4-L5.             ASSESSMENT/DISPOSITION: Predicated on the focality of compressive elements noted in the CT scan, myelogram and MRI I have recommended a laminectomy of L3 possibly L4.  There is no inducible movement therefore the indication for PLIF are absent at this time.  He is aware however the laminectomies may produce instability in the future for which additional surgery would be necessitated.          "

## 2021-03-04 ENCOUNTER — TELEPHONE (OUTPATIENT)
Dept: NEUROSURGERY | Facility: CLINIC | Age: 66
End: 2021-03-04

## 2021-03-04 NOTE — TELEPHONE ENCOUNTER
I spoke to the patient this morning. He was inquiring about his deductible for surgery. I gave him the surgery diagnosis and cpt code, and told him to call his insurance to obtain that information.  Hopefully, billing department will be able to assist pt with any other questions regarding surgery charges.

## 2021-03-04 NOTE — TELEPHONE ENCOUNTER
Caller: Behzad Peoples    Relationship to patient: Self    Best call back number: [phone]    Chief complaint: PATIENT CALLED AGAIN TO INQUIRE INTO ESTIMATE FOR UPCOMING SURGERY AFTER SPEAKING TO [facility] THIS MORNING. PATIENT WAS GIVEN CPT CODES SO [facility]UB TRANSFERRED CALL TO BILLING AT TIME OF CALL. PLEASE ADVISE IF THERE IS ANY ADDITIONAL INFO PATIENT MAY NEED FOR UPCOMING SURGERY?    PATIENT CAN BE CONTACTED WITH FURTHER ASSISTANCE.    Type of visit: SURGERY    Requested date: N/A      If rescheduling, when is the original appointment: N/A    Additional notes: N/A

## 2021-03-10 ENCOUNTER — TELEPHONE (OUTPATIENT)
Dept: NEUROSURGERY | Facility: CLINIC | Age: 66
End: 2021-03-10

## 2021-03-10 NOTE — TELEPHONE ENCOUNTER
PATIENT WOULD LIKE SOMEONE TO CALL HIM BACK.  IT IS IMPORTANT TO KNOW WHO IS GOING TO BE WORKING WITH DR CHEN DURING SURGERY BECAUSE HE WANTS TO KNOW IF THEY ARE IN NETWORK WITH HIS INS.  HE DOES NOT WANT SOMEONE OUT OF NETWORK.  HE HAS LEFT SEVERAL MESSAGES FOR [employer] AND SHE HAS NOT RESPONDED.  PLEASE ADVISE    [phone]

## 2021-03-17 ENCOUNTER — APPOINTMENT (OUTPATIENT)
Dept: PREADMISSION TESTING | Facility: HOSPITAL | Age: 66
End: 2021-03-17

## 2021-03-17 VITALS — HEIGHT: 73 IN | BODY MASS INDEX: 33.27 KG/M2 | WEIGHT: 251 LBS

## 2021-03-17 DIAGNOSIS — M51.36 DEGENERATIVE DISC DISEASE, LUMBAR: ICD-10-CM

## 2021-03-17 LAB
ALBUMIN SERPL-MCNC: 4.6 G/DL (ref 3.5–5.2)
ALBUMIN/GLOB SERPL: 2 G/DL
ALP SERPL-CCNC: 59 U/L (ref 39–117)
ALT SERPL W P-5'-P-CCNC: 26 U/L (ref 1–41)
ANION GAP SERPL CALCULATED.3IONS-SCNC: 7 MMOL/L (ref 5–15)
AST SERPL-CCNC: 23 U/L (ref 1–40)
BILIRUB SERPL-MCNC: 0.4 MG/DL (ref 0–1.2)
BILIRUB UR QL STRIP: NEGATIVE
BUN SERPL-MCNC: 12 MG/DL (ref 8–23)
BUN/CREAT SERPL: 11.7 (ref 7–25)
CALCIUM SPEC-SCNC: 9.8 MG/DL (ref 8.6–10.5)
CHLORIDE SERPL-SCNC: 102 MMOL/L (ref 98–107)
CLARITY UR: CLEAR
CO2 SERPL-SCNC: 27 MMOL/L (ref 22–29)
COLOR UR: YELLOW
CREAT SERPL-MCNC: 1.03 MG/DL (ref 0.76–1.27)
DEPRECATED RDW RBC AUTO: 42.5 FL (ref 37–54)
ERYTHROCYTE [DISTWIDTH] IN BLOOD BY AUTOMATED COUNT: 12.2 % (ref 12.3–15.4)
GFR SERPL CREATININE-BSD FRML MDRD: 73 ML/MIN/1.73
GLOBULIN UR ELPH-MCNC: 2.3 GM/DL
GLUCOSE SERPL-MCNC: 162 MG/DL (ref 65–99)
GLUCOSE UR STRIP-MCNC: NEGATIVE MG/DL
HCT VFR BLD AUTO: 43.5 % (ref 37.5–51)
HGB BLD-MCNC: 15 G/DL (ref 13–17.7)
HGB UR QL STRIP.AUTO: NEGATIVE
KETONES UR QL STRIP: NEGATIVE
LEUKOCYTE ESTERASE UR QL STRIP.AUTO: NEGATIVE
MCH RBC QN AUTO: 32.8 PG (ref 26.6–33)
MCHC RBC AUTO-ENTMCNC: 34.5 G/DL (ref 31.5–35.7)
MCV RBC AUTO: 95.2 FL (ref 79–97)
NITRITE UR QL STRIP: NEGATIVE
PH UR STRIP.AUTO: 5.5 [PH] (ref 5–8)
PLATELET # BLD AUTO: 282 10*3/MM3 (ref 140–450)
PMV BLD AUTO: 9.8 FL (ref 6–12)
POTASSIUM SERPL-SCNC: 4 MMOL/L (ref 3.5–5.2)
PROT SERPL-MCNC: 6.9 G/DL (ref 6–8.5)
PROT UR QL STRIP: NEGATIVE
QT INTERVAL: 408 MS
QTC INTERVAL: 449 MS
RBC # BLD AUTO: 4.57 10*6/MM3 (ref 4.14–5.8)
SARS-COV-2 RNA NOSE QL NAA+PROBE: NOT DETECTED
SODIUM SERPL-SCNC: 136 MMOL/L (ref 136–145)
SP GR UR STRIP: 1.01 (ref 1–1.03)
UROBILINOGEN UR QL STRIP: NORMAL
WBC # BLD AUTO: 6.69 10*3/MM3 (ref 3.4–10.8)

## 2021-03-17 PROCEDURE — 93005 ELECTROCARDIOGRAM TRACING: CPT

## 2021-03-17 PROCEDURE — 93010 ELECTROCARDIOGRAM REPORT: CPT | Performed by: INTERNAL MEDICINE

## 2021-03-17 PROCEDURE — U0004 COV-19 TEST NON-CDC HGH THRU: HCPCS

## 2021-03-17 PROCEDURE — C9803 HOPD COVID-19 SPEC COLLECT: HCPCS

## 2021-03-17 PROCEDURE — 36415 COLL VENOUS BLD VENIPUNCTURE: CPT

## 2021-03-17 PROCEDURE — 87081 CULTURE SCREEN ONLY: CPT

## 2021-03-17 PROCEDURE — 85027 COMPLETE CBC AUTOMATED: CPT

## 2021-03-17 PROCEDURE — 81003 URINALYSIS AUTO W/O SCOPE: CPT

## 2021-03-17 PROCEDURE — 80053 COMPREHEN METABOLIC PANEL: CPT

## 2021-03-17 RX ORDER — MULTIVIT WITH MINERALS/LUTEIN
1000 TABLET ORAL 2 TIMES DAILY
COMMUNITY

## 2021-03-17 RX ORDER — MULTIPLE VITAMINS W/ MINERALS TAB 9MG-400MCG
1 TAB ORAL DAILY
COMMUNITY

## 2021-03-18 ENCOUNTER — ANESTHESIA EVENT (OUTPATIENT)
Dept: PERIOP | Facility: HOSPITAL | Age: 66
End: 2021-03-18

## 2021-03-18 LAB — MRSA SPEC QL CULT: NORMAL

## 2021-03-19 ENCOUNTER — APPOINTMENT (OUTPATIENT)
Dept: GENERAL RADIOLOGY | Facility: HOSPITAL | Age: 66
End: 2021-03-19

## 2021-03-19 ENCOUNTER — HOSPITAL ENCOUNTER (OUTPATIENT)
Facility: HOSPITAL | Age: 66
LOS: 1 days | Discharge: HOME | End: 2021-03-22
Attending: NEUROLOGICAL SURGERY | Admitting: NEUROLOGICAL SURGERY

## 2021-03-19 ENCOUNTER — ANESTHESIA (OUTPATIENT)
Dept: PERIOP | Facility: HOSPITAL | Age: 66
End: 2021-03-19

## 2021-03-19 DIAGNOSIS — M51.36 DEGENERATIVE DISC DISEASE, LUMBAR: ICD-10-CM

## 2021-03-19 DIAGNOSIS — M48.062 SPINAL STENOSIS OF LUMBAR REGION WITH NEUROGENIC CLAUDICATION: Primary | ICD-10-CM

## 2021-03-19 PROCEDURE — 25010000003 BUPIVACAINE LIPOSOME 1.3 % SUSPENSION: Performed by: NEUROLOGICAL SURGERY

## 2021-03-19 PROCEDURE — 63048 LAM FACETEC &FORAMOT EA ADDL: CPT | Performed by: PHYSICIAN ASSISTANT

## 2021-03-19 PROCEDURE — 25010000002 ONDANSETRON PER 1 MG: Performed by: NEUROLOGICAL SURGERY

## 2021-03-19 PROCEDURE — 63047 LAM FACETEC & FORAMOT LUMBAR: CPT | Performed by: NEUROLOGICAL SURGERY

## 2021-03-19 PROCEDURE — 72020 X-RAY EXAM OF SPINE 1 VIEW: CPT

## 2021-03-19 PROCEDURE — 63047 LAM FACETEC & FORAMOT LUMBAR: CPT | Performed by: PHYSICIAN ASSISTANT

## 2021-03-19 PROCEDURE — 25010000003 CEFAZOLIN IN DEXTROSE 2-4 GM/100ML-% SOLUTION: Performed by: NEUROLOGICAL SURGERY

## 2021-03-19 PROCEDURE — C9290 INJ, BUPIVACAINE LIPOSOME: HCPCS | Performed by: NEUROLOGICAL SURGERY

## 2021-03-19 PROCEDURE — 25010000002 HYDROMORPHONE PER 4 MG: Performed by: NEUROLOGICAL SURGERY

## 2021-03-19 PROCEDURE — 25010000002 ONDANSETRON PER 1 MG: Performed by: NURSE ANESTHETIST, CERTIFIED REGISTERED

## 2021-03-19 PROCEDURE — 63048 LAM FACETEC &FORAMOT EA ADDL: CPT | Performed by: NEUROLOGICAL SURGERY

## 2021-03-19 PROCEDURE — 25010000002 PHENYLEPHRINE 10 MG/ML SOLUTION 1 ML VIAL: Performed by: NURSE ANESTHETIST, CERTIFIED REGISTERED

## 2021-03-19 PROCEDURE — 25010000003 POTASSIUM CHLORIDE PER 2 MEQ: Performed by: NEUROLOGICAL SURGERY

## 2021-03-19 PROCEDURE — 25010000002 NEOSTIGMINE 10 MG/10ML SOLUTION: Performed by: NURSE ANESTHETIST, CERTIFIED REGISTERED

## 2021-03-19 PROCEDURE — 25010000002 DEXAMETHASONE: Performed by: NEUROLOGICAL SURGERY

## 2021-03-19 PROCEDURE — 25010000002 FENTANYL CITRATE (PF) 100 MCG/2ML SOLUTION: Performed by: NURSE ANESTHETIST, CERTIFIED REGISTERED

## 2021-03-19 PROCEDURE — 25010000002 PROPOFOL 10 MG/ML EMULSION: Performed by: NURSE ANESTHETIST, CERTIFIED REGISTERED

## 2021-03-19 PROCEDURE — 63710000001 DEXAMETHASONE PER 0.25 MG: Performed by: NEUROLOGICAL SURGERY

## 2021-03-19 DEVICE — HEMOST ABS SURGIFOAM SZ100 8X12 10MM: Type: IMPLANTABLE DEVICE | Site: SPINE LUMBAR | Status: FUNCTIONAL

## 2021-03-19 DEVICE — FLOSEAL HEMOSTATIC MATRIX, 10ML
Type: IMPLANTABLE DEVICE | Site: SPINE LUMBAR | Status: FUNCTIONAL
Brand: FLOSEAL HEMOSTATIC MATRIX

## 2021-03-19 RX ORDER — ATORVASTATIN CALCIUM 20 MG/1
20 TABLET, FILM COATED ORAL DAILY
Status: DISCONTINUED | OUTPATIENT
Start: 2021-03-19 | End: 2021-03-21

## 2021-03-19 RX ORDER — FAMOTIDINE 20 MG/1
20 TABLET, FILM COATED ORAL ONCE
Status: COMPLETED | OUTPATIENT
Start: 2021-03-19 | End: 2021-03-19

## 2021-03-19 RX ORDER — SODIUM CHLORIDE, SODIUM LACTATE, POTASSIUM CHLORIDE, CALCIUM CHLORIDE 600; 310; 30; 20 MG/100ML; MG/100ML; MG/100ML; MG/100ML
100 INJECTION, SOLUTION INTRAVENOUS CONTINUOUS
Status: DISCONTINUED | OUTPATIENT
Start: 2021-03-19 | End: 2021-03-22 | Stop reason: HOSPADM

## 2021-03-19 RX ORDER — FAMOTIDINE 10 MG/ML
20 INJECTION, SOLUTION INTRAVENOUS ONCE
Status: DISCONTINUED | OUTPATIENT
Start: 2021-03-19 | End: 2021-03-19 | Stop reason: HOSPADM

## 2021-03-19 RX ORDER — ACETAMINOPHEN 500 MG
500 TABLET ORAL EVERY 6 HOURS
Status: DISCONTINUED | OUTPATIENT
Start: 2021-03-19 | End: 2021-03-22 | Stop reason: HOSPADM

## 2021-03-19 RX ORDER — MIDAZOLAM HYDROCHLORIDE 1 MG/ML
1 INJECTION INTRAMUSCULAR; INTRAVENOUS
Status: DISCONTINUED | OUTPATIENT
Start: 2021-03-19 | End: 2021-03-19 | Stop reason: HOSPADM

## 2021-03-19 RX ORDER — DEXAMETHASONE SODIUM PHOSPHATE 4 MG/ML
4 INJECTION, SOLUTION INTRA-ARTICULAR; INTRALESIONAL; INTRAMUSCULAR; INTRAVENOUS; SOFT TISSUE EVERY 6 HOURS
Status: DISCONTINUED | OUTPATIENT
Start: 2021-03-19 | End: 2021-03-20

## 2021-03-19 RX ORDER — SODIUM CHLORIDE AND POTASSIUM CHLORIDE 150; 450 MG/100ML; MG/100ML
75 INJECTION, SOLUTION INTRAVENOUS CONTINUOUS
Status: DISCONTINUED | OUTPATIENT
Start: 2021-03-19 | End: 2021-03-22 | Stop reason: HOSPADM

## 2021-03-19 RX ORDER — SODIUM CHLORIDE 0.9 % (FLUSH) 0.9 %
3-10 SYRINGE (ML) INJECTION AS NEEDED
Status: DISCONTINUED | OUTPATIENT
Start: 2021-03-19 | End: 2021-03-19 | Stop reason: HOSPADM

## 2021-03-19 RX ORDER — SODIUM CHLORIDE 0.9 % (FLUSH) 0.9 %
10 SYRINGE (ML) INJECTION AS NEEDED
Status: DISCONTINUED | OUTPATIENT
Start: 2021-03-19 | End: 2021-03-19 | Stop reason: HOSPADM

## 2021-03-19 RX ORDER — SODIUM CHLORIDE 0.9 % (FLUSH) 0.9 %
3 SYRINGE (ML) INJECTION EVERY 12 HOURS SCHEDULED
Status: DISCONTINUED | OUTPATIENT
Start: 2021-03-19 | End: 2021-03-22 | Stop reason: HOSPADM

## 2021-03-19 RX ORDER — ROCURONIUM BROMIDE 10 MG/ML
INJECTION, SOLUTION INTRAVENOUS AS NEEDED
Status: DISCONTINUED | OUTPATIENT
Start: 2021-03-19 | End: 2021-03-19 | Stop reason: SURG

## 2021-03-19 RX ORDER — ASCORBIC ACID 500 MG
1000 TABLET ORAL DAILY
Status: DISCONTINUED | OUTPATIENT
Start: 2021-03-19 | End: 2021-03-22 | Stop reason: HOSPADM

## 2021-03-19 RX ORDER — SODIUM CHLORIDE, SODIUM LACTATE, POTASSIUM CHLORIDE, CALCIUM CHLORIDE 600; 310; 30; 20 MG/100ML; MG/100ML; MG/100ML; MG/100ML
9 INJECTION, SOLUTION INTRAVENOUS CONTINUOUS
Status: DISCONTINUED | OUTPATIENT
Start: 2021-03-19 | End: 2021-03-22 | Stop reason: HOSPADM

## 2021-03-19 RX ORDER — TRAMADOL HYDROCHLORIDE 50 MG/1
50 TABLET ORAL EVERY 4 HOURS PRN
Status: DISCONTINUED | OUTPATIENT
Start: 2021-03-19 | End: 2021-03-22 | Stop reason: HOSPADM

## 2021-03-19 RX ORDER — PROMETHAZINE HYDROCHLORIDE 25 MG/1
25 TABLET ORAL ONCE AS NEEDED
Status: DISCONTINUED | OUTPATIENT
Start: 2021-03-19 | End: 2021-03-19 | Stop reason: HOSPADM

## 2021-03-19 RX ORDER — NALOXONE HCL 0.4 MG/ML
0.4 VIAL (ML) INJECTION AS NEEDED
Status: DISCONTINUED | OUTPATIENT
Start: 2021-03-19 | End: 2021-03-19 | Stop reason: HOSPADM

## 2021-03-19 RX ORDER — PROMETHAZINE HYDROCHLORIDE 25 MG/1
25 SUPPOSITORY RECTAL ONCE AS NEEDED
Status: DISCONTINUED | OUTPATIENT
Start: 2021-03-19 | End: 2021-03-19 | Stop reason: HOSPADM

## 2021-03-19 RX ORDER — ONDANSETRON 2 MG/ML
4 INJECTION INTRAMUSCULAR; INTRAVENOUS ONCE AS NEEDED
Status: DISCONTINUED | OUTPATIENT
Start: 2021-03-19 | End: 2021-03-19 | Stop reason: HOSPADM

## 2021-03-19 RX ORDER — MAGNESIUM HYDROXIDE 1200 MG/15ML
LIQUID ORAL AS NEEDED
Status: DISCONTINUED | OUTPATIENT
Start: 2021-03-19 | End: 2021-03-19 | Stop reason: HOSPADM

## 2021-03-19 RX ORDER — LABETALOL HYDROCHLORIDE 5 MG/ML
5 INJECTION, SOLUTION INTRAVENOUS
Status: DISCONTINUED | OUTPATIENT
Start: 2021-03-19 | End: 2021-03-19 | Stop reason: HOSPADM

## 2021-03-19 RX ORDER — TEMAZEPAM 15 MG/1
30 CAPSULE ORAL NIGHTLY PRN
Status: DISCONTINUED | OUTPATIENT
Start: 2021-03-19 | End: 2021-03-22 | Stop reason: HOSPADM

## 2021-03-19 RX ORDER — DOCUSATE SODIUM 100 MG/1
100 CAPSULE, LIQUID FILLED ORAL 2 TIMES DAILY
Status: DISCONTINUED | OUTPATIENT
Start: 2021-03-19 | End: 2021-03-22 | Stop reason: HOSPADM

## 2021-03-19 RX ORDER — MULTIPLE VITAMINS W/ MINERALS TAB 9MG-400MCG
1 TAB ORAL DAILY
Status: DISCONTINUED | OUTPATIENT
Start: 2021-03-19 | End: 2021-03-22 | Stop reason: HOSPADM

## 2021-03-19 RX ORDER — OXYCODONE AND ACETAMINOPHEN 7.5; 325 MG/1; MG/1
2 TABLET ORAL EVERY 4 HOURS PRN
Status: DISCONTINUED | OUTPATIENT
Start: 2021-03-19 | End: 2021-03-22 | Stop reason: HOSPADM

## 2021-03-19 RX ORDER — HYDROCODONE BITARTRATE AND ACETAMINOPHEN 5; 325 MG/1; MG/1
1 TABLET ORAL ONCE AS NEEDED
Status: DISCONTINUED | OUTPATIENT
Start: 2021-03-19 | End: 2021-03-19 | Stop reason: HOSPADM

## 2021-03-19 RX ORDER — NALOXONE HCL 0.4 MG/ML
0.4 VIAL (ML) INJECTION
Status: DISCONTINUED | OUTPATIENT
Start: 2021-03-19 | End: 2021-03-22 | Stop reason: HOSPADM

## 2021-03-19 RX ORDER — PROMETHAZINE HYDROCHLORIDE 12.5 MG/1
12.5 TABLET ORAL EVERY 6 HOURS PRN
Status: DISCONTINUED | OUTPATIENT
Start: 2021-03-19 | End: 2021-03-22 | Stop reason: HOSPADM

## 2021-03-19 RX ORDER — HYDROMORPHONE HYDROCHLORIDE 1 MG/ML
0.5 INJECTION, SOLUTION INTRAMUSCULAR; INTRAVENOUS; SUBCUTANEOUS
Status: DISCONTINUED | OUTPATIENT
Start: 2021-03-19 | End: 2021-03-22 | Stop reason: HOSPADM

## 2021-03-19 RX ORDER — PROPOFOL 10 MG/ML
VIAL (ML) INTRAVENOUS AS NEEDED
Status: DISCONTINUED | OUTPATIENT
Start: 2021-03-19 | End: 2021-03-19 | Stop reason: SURG

## 2021-03-19 RX ORDER — SODIUM CHLORIDE 9 MG/ML
INJECTION, SOLUTION INTRAVENOUS AS NEEDED
Status: DISCONTINUED | OUTPATIENT
Start: 2021-03-19 | End: 2021-03-19 | Stop reason: HOSPADM

## 2021-03-19 RX ORDER — LIDOCAINE HYDROCHLORIDE 10 MG/ML
0.5 INJECTION, SOLUTION EPIDURAL; INFILTRATION; INTRACAUDAL; PERINEURAL ONCE AS NEEDED
Status: COMPLETED | OUTPATIENT
Start: 2021-03-19 | End: 2021-03-19

## 2021-03-19 RX ORDER — VECURONIUM BROMIDE 1 MG/ML
INJECTION, POWDER, LYOPHILIZED, FOR SOLUTION INTRAVENOUS AS NEEDED
Status: DISCONTINUED | OUTPATIENT
Start: 2021-03-19 | End: 2021-03-19 | Stop reason: SURG

## 2021-03-19 RX ORDER — HYDROCODONE BITARTRATE AND ACETAMINOPHEN 7.5; 325 MG/1; MG/1
1 TABLET ORAL ONCE
Status: COMPLETED | OUTPATIENT
Start: 2021-03-19 | End: 2021-03-19

## 2021-03-19 RX ORDER — SODIUM CHLORIDE 0.9 % (FLUSH) 0.9 %
3 SYRINGE (ML) INJECTION EVERY 12 HOURS SCHEDULED
Status: DISCONTINUED | OUTPATIENT
Start: 2021-03-19 | End: 2021-03-19 | Stop reason: HOSPADM

## 2021-03-19 RX ORDER — SODIUM CHLORIDE 0.9 % (FLUSH) 0.9 %
10 SYRINGE (ML) INJECTION EVERY 12 HOURS SCHEDULED
Status: DISCONTINUED | OUTPATIENT
Start: 2021-03-19 | End: 2021-03-19 | Stop reason: HOSPADM

## 2021-03-19 RX ORDER — ONDANSETRON 2 MG/ML
INJECTION INTRAMUSCULAR; INTRAVENOUS AS NEEDED
Status: DISCONTINUED | OUTPATIENT
Start: 2021-03-19 | End: 2021-03-19 | Stop reason: SURG

## 2021-03-19 RX ORDER — DROPERIDOL 2.5 MG/ML
0.62 INJECTION, SOLUTION INTRAMUSCULAR; INTRAVENOUS ONCE AS NEEDED
Status: DISCONTINUED | OUTPATIENT
Start: 2021-03-19 | End: 2021-03-19 | Stop reason: HOSPADM

## 2021-03-19 RX ORDER — BUPIVACAINE HYDROCHLORIDE 2.5 MG/ML
INJECTION, SOLUTION EPIDURAL; INFILTRATION; INTRACAUDAL AS NEEDED
Status: DISCONTINUED | OUTPATIENT
Start: 2021-03-19 | End: 2021-03-19 | Stop reason: HOSPADM

## 2021-03-19 RX ORDER — SODIUM CHLORIDE 0.9 % (FLUSH) 0.9 %
10 SYRINGE (ML) INJECTION AS NEEDED
Status: DISCONTINUED | OUTPATIENT
Start: 2021-03-19 | End: 2021-03-22 | Stop reason: HOSPADM

## 2021-03-19 RX ORDER — PROMETHAZINE HYDROCHLORIDE 12.5 MG/1
12.5 SUPPOSITORY RECTAL EVERY 6 HOURS PRN
Status: DISCONTINUED | OUTPATIENT
Start: 2021-03-19 | End: 2021-03-22 | Stop reason: HOSPADM

## 2021-03-19 RX ORDER — IBUPROFEN 800 MG/1
800 TABLET ORAL ONCE
Status: COMPLETED | OUTPATIENT
Start: 2021-03-19 | End: 2021-03-19

## 2021-03-19 RX ORDER — CEFAZOLIN SODIUM 2 G/100ML
2 INJECTION, SOLUTION INTRAVENOUS ONCE
Status: COMPLETED | OUTPATIENT
Start: 2021-03-19 | End: 2021-03-19

## 2021-03-19 RX ORDER — NEOSTIGMINE METHYLSULFATE 1 MG/ML
INJECTION, SOLUTION INTRAVENOUS AS NEEDED
Status: DISCONTINUED | OUTPATIENT
Start: 2021-03-19 | End: 2021-03-19 | Stop reason: SURG

## 2021-03-19 RX ORDER — GLYCOPYRROLATE 0.2 MG/ML
INJECTION INTRAMUSCULAR; INTRAVENOUS AS NEEDED
Status: DISCONTINUED | OUTPATIENT
Start: 2021-03-19 | End: 2021-03-19 | Stop reason: SURG

## 2021-03-19 RX ORDER — IPRATROPIUM BROMIDE AND ALBUTEROL SULFATE 2.5; .5 MG/3ML; MG/3ML
3 SOLUTION RESPIRATORY (INHALATION) ONCE AS NEEDED
Status: DISCONTINUED | OUTPATIENT
Start: 2021-03-19 | End: 2021-03-19 | Stop reason: HOSPADM

## 2021-03-19 RX ORDER — BACLOFEN 10 MG/1
10 TABLET ORAL EVERY 8 HOURS SCHEDULED
Status: DISCONTINUED | OUTPATIENT
Start: 2021-03-19 | End: 2021-03-22 | Stop reason: HOSPADM

## 2021-03-19 RX ORDER — MIDAZOLAM HYDROCHLORIDE 1 MG/ML
2 INJECTION INTRAMUSCULAR; INTRAVENOUS
Status: DISCONTINUED | OUTPATIENT
Start: 2021-03-19 | End: 2021-03-19 | Stop reason: HOSPADM

## 2021-03-19 RX ORDER — DROPERIDOL 2.5 MG/ML
0.62 INJECTION, SOLUTION INTRAMUSCULAR; INTRAVENOUS AS NEEDED
Status: DISCONTINUED | OUTPATIENT
Start: 2021-03-19 | End: 2021-03-19 | Stop reason: HOSPADM

## 2021-03-19 RX ORDER — ONDANSETRON 2 MG/ML
4 INJECTION INTRAMUSCULAR; INTRAVENOUS EVERY 6 HOURS PRN
Status: DISCONTINUED | OUTPATIENT
Start: 2021-03-19 | End: 2021-03-22 | Stop reason: HOSPADM

## 2021-03-19 RX ORDER — ACETAMINOPHEN 325 MG/1
650 TABLET ORAL ONCE
Status: COMPLETED | OUTPATIENT
Start: 2021-03-19 | End: 2021-03-19

## 2021-03-19 RX ORDER — LIDOCAINE HYDROCHLORIDE 10 MG/ML
INJECTION, SOLUTION EPIDURAL; INFILTRATION; INTRACAUDAL; PERINEURAL AS NEEDED
Status: DISCONTINUED | OUTPATIENT
Start: 2021-03-19 | End: 2021-03-19 | Stop reason: SURG

## 2021-03-19 RX ORDER — EPHEDRINE SULFATE 50 MG/ML
INJECTION, SOLUTION INTRAVENOUS AS NEEDED
Status: DISCONTINUED | OUTPATIENT
Start: 2021-03-19 | End: 2021-03-19 | Stop reason: SURG

## 2021-03-19 RX ORDER — MEPERIDINE HYDROCHLORIDE 25 MG/ML
12.5 INJECTION INTRAMUSCULAR; INTRAVENOUS; SUBCUTANEOUS
Status: DISCONTINUED | OUTPATIENT
Start: 2021-03-19 | End: 2021-03-19 | Stop reason: HOSPADM

## 2021-03-19 RX ORDER — HYDRALAZINE HYDROCHLORIDE 20 MG/ML
5 INJECTION INTRAMUSCULAR; INTRAVENOUS
Status: DISCONTINUED | OUTPATIENT
Start: 2021-03-19 | End: 2021-03-19 | Stop reason: HOSPADM

## 2021-03-19 RX ORDER — FENTANYL CITRATE 50 UG/ML
INJECTION, SOLUTION INTRAMUSCULAR; INTRAVENOUS AS NEEDED
Status: DISCONTINUED | OUTPATIENT
Start: 2021-03-19 | End: 2021-03-19 | Stop reason: SURG

## 2021-03-19 RX ORDER — FAMOTIDINE 20 MG/1
40 TABLET, FILM COATED ORAL NIGHTLY
Status: DISCONTINUED | OUTPATIENT
Start: 2021-03-19 | End: 2021-03-22 | Stop reason: HOSPADM

## 2021-03-19 RX ORDER — HYDROMORPHONE HYDROCHLORIDE 1 MG/ML
0.5 INJECTION, SOLUTION INTRAMUSCULAR; INTRAVENOUS; SUBCUTANEOUS
Status: DISCONTINUED | OUTPATIENT
Start: 2021-03-19 | End: 2021-03-19 | Stop reason: HOSPADM

## 2021-03-19 RX ORDER — GABAPENTIN 100 MG/1
100 CAPSULE ORAL EVERY 6 HOURS
Status: DISCONTINUED | OUTPATIENT
Start: 2021-03-19 | End: 2021-03-22 | Stop reason: HOSPADM

## 2021-03-19 RX ORDER — DEXAMETHASONE 4 MG/1
4 TABLET ORAL EVERY 6 HOURS
Status: DISCONTINUED | OUTPATIENT
Start: 2021-03-19 | End: 2021-03-20

## 2021-03-19 RX ORDER — CEFAZOLIN SODIUM 2 G/100ML
2 INJECTION, SOLUTION INTRAVENOUS EVERY 8 HOURS
Status: COMPLETED | OUTPATIENT
Start: 2021-03-19 | End: 2021-03-20

## 2021-03-19 RX ORDER — BUPIVACAINE HCL/0.9 % NACL/PF 0.125 %
PLASTIC BAG, INJECTION (ML) EPIDURAL AS NEEDED
Status: DISCONTINUED | OUTPATIENT
Start: 2021-03-19 | End: 2021-03-19 | Stop reason: SURG

## 2021-03-19 RX ORDER — FENTANYL CITRATE 50 UG/ML
50 INJECTION, SOLUTION INTRAMUSCULAR; INTRAVENOUS
Status: DISCONTINUED | OUTPATIENT
Start: 2021-03-19 | End: 2021-03-19 | Stop reason: HOSPADM

## 2021-03-19 RX ADMIN — LIDOCAINE HYDROCHLORIDE 50 MG: 10 INJECTION, SOLUTION EPIDURAL; INFILTRATION; INTRACAUDAL; PERINEURAL at 09:13

## 2021-03-19 RX ADMIN — ROCURONIUM BROMIDE 10 MG: 10 INJECTION INTRAVENOUS at 10:25

## 2021-03-19 RX ADMIN — DEXAMETHASONE 4 MG: 4 TABLET ORAL at 16:08

## 2021-03-19 RX ADMIN — Medication 80 MCG: at 09:55

## 2021-03-19 RX ADMIN — PROPOFOL 200 MG: 10 INJECTION, EMULSION INTRAVENOUS at 09:13

## 2021-03-19 RX ADMIN — ACETAMINOPHEN 500 MG: 500 TABLET, FILM COATED ORAL at 20:19

## 2021-03-19 RX ADMIN — OXYCODONE HYDROCHLORIDE AND ACETAMINOPHEN 1000 MG: 500 TABLET ORAL at 16:09

## 2021-03-19 RX ADMIN — DOCUSATE SODIUM 100 MG: 100 CAPSULE, LIQUID FILLED ORAL at 20:19

## 2021-03-19 RX ADMIN — Medication 80 MCG: at 09:57

## 2021-03-19 RX ADMIN — ACETAMINOPHEN 650 MG: 325 TABLET ORAL at 08:13

## 2021-03-19 RX ADMIN — GABAPENTIN 100 MG: 100 CAPSULE ORAL at 16:09

## 2021-03-19 RX ADMIN — HYDROMORPHONE HYDROCHLORIDE 0.5 MG: 1 INJECTION, SOLUTION INTRAMUSCULAR; INTRAVENOUS; SUBCUTANEOUS at 13:33

## 2021-03-19 RX ADMIN — POTASSIUM CHLORIDE AND SODIUM CHLORIDE 75 ML/HR: 450; 150 INJECTION, SOLUTION INTRAVENOUS at 16:09

## 2021-03-19 RX ADMIN — Medication 80 MCG: at 09:49

## 2021-03-19 RX ADMIN — ROCURONIUM BROMIDE 20 MG: 10 INJECTION INTRAVENOUS at 09:53

## 2021-03-19 RX ADMIN — BACLOFEN 10 MG: 10 TABLET ORAL at 16:09

## 2021-03-19 RX ADMIN — Medication 1 TABLET: at 16:09

## 2021-03-19 RX ADMIN — ROCURONIUM BROMIDE 50 MG: 10 INJECTION INTRAVENOUS at 09:13

## 2021-03-19 RX ADMIN — VECURONIUM BROMIDE 1 MG: 1 INJECTION, POWDER, LYOPHILIZED, FOR SOLUTION INTRAVENOUS at 11:11

## 2021-03-19 RX ADMIN — NEOSTIGMINE 2.5 MG: 1 INJECTION INTRAVENOUS at 11:36

## 2021-03-19 RX ADMIN — EPHEDRINE SULFATE 10 MG: 50 INJECTION, SOLUTION INTRAVENOUS at 11:43

## 2021-03-19 RX ADMIN — ONDANSETRON 4 MG: 2 INJECTION INTRAMUSCULAR; INTRAVENOUS at 11:22

## 2021-03-19 RX ADMIN — IBUPROFEN 800 MG: 800 TABLET, FILM COATED ORAL at 08:13

## 2021-03-19 RX ADMIN — ROCURONIUM BROMIDE 10 MG: 10 INJECTION INTRAVENOUS at 10:58

## 2021-03-19 RX ADMIN — OXYCODONE HYDROCHLORIDE AND ACETAMINOPHEN 1 TABLET: 7.5; 325 TABLET ORAL at 20:26

## 2021-03-19 RX ADMIN — FENTANYL CITRATE 50 MCG: 50 INJECTION, SOLUTION INTRAMUSCULAR; INTRAVENOUS at 11:35

## 2021-03-19 RX ADMIN — HYDROCODONE BITARTRATE AND ACETAMINOPHEN 1 TABLET: 7.5; 325 TABLET ORAL at 08:13

## 2021-03-19 RX ADMIN — PHENYLEPHRINE HYDROCHLORIDE 0.5 MCG/KG/MIN: 10 INJECTION INTRAVENOUS at 10:01

## 2021-03-19 RX ADMIN — ROCURONIUM BROMIDE 10 MG: 10 INJECTION INTRAVENOUS at 09:36

## 2021-03-19 RX ADMIN — FENTANYL CITRATE 50 MCG: 50 INJECTION, SOLUTION INTRAMUSCULAR; INTRAVENOUS at 09:13

## 2021-03-19 RX ADMIN — LIDOCAINE HYDROCHLORIDE 0.5 ML: 10 INJECTION, SOLUTION EPIDURAL; INFILTRATION; INTRACAUDAL; PERINEURAL at 08:12

## 2021-03-19 RX ADMIN — ATORVASTATIN CALCIUM 20 MG: 20 TABLET, FILM COATED ORAL at 16:08

## 2021-03-19 RX ADMIN — ACETAMINOPHEN 500 MG: 500 TABLET, FILM COATED ORAL at 16:08

## 2021-03-19 RX ADMIN — FAMOTIDINE 40 MG: 20 TABLET, FILM COATED ORAL at 20:19

## 2021-03-19 RX ADMIN — DEXAMETHASONE SODIUM PHOSPHATE 10 MG: 10 INJECTION INTRAMUSCULAR; INTRAVENOUS at 09:21

## 2021-03-19 RX ADMIN — DEXAMETHASONE 4 MG: 4 TABLET ORAL at 20:19

## 2021-03-19 RX ADMIN — LISINOPRIL: 20 TABLET ORAL at 16:08

## 2021-03-19 RX ADMIN — ONDANSETRON 4 MG: 2 INJECTION INTRAMUSCULAR; INTRAVENOUS at 13:32

## 2021-03-19 RX ADMIN — CEFAZOLIN SODIUM 2 G: 2 INJECTION, SOLUTION INTRAVENOUS at 20:20

## 2021-03-19 RX ADMIN — SODIUM CHLORIDE, POTASSIUM CHLORIDE, SODIUM LACTATE AND CALCIUM CHLORIDE 9 ML/HR: 600; 310; 30; 20 INJECTION, SOLUTION INTRAVENOUS at 08:12

## 2021-03-19 RX ADMIN — GLYCOPYRROLATE 0.4 MG: 0.4 INJECTION INTRAMUSCULAR; INTRAVENOUS at 11:36

## 2021-03-19 RX ADMIN — GABAPENTIN 100 MG: 100 CAPSULE ORAL at 20:20

## 2021-03-19 RX ADMIN — CEFAZOLIN SODIUM 2 G: 2 INJECTION, SOLUTION INTRAVENOUS at 09:12

## 2021-03-19 RX ADMIN — FAMOTIDINE 20 MG: 20 TABLET, FILM COATED ORAL at 08:12

## 2021-03-19 RX ADMIN — BACLOFEN 10 MG: 10 TABLET ORAL at 20:20

## 2021-03-19 NOTE — ANESTHESIA PREPROCEDURE EVALUATION
Anesthesia Evaluation                  Airway   Mallampati: II  Dental      Pulmonary    Cardiovascular     (+) hypertension,       Neuro/Psych  GI/Hepatic/Renal/Endo      Musculoskeletal     Abdominal    Substance History      OB/GYN          Other                        Anesthesia Plan    ASA 3     general     intravenous induction     Anesthetic plan, all risks, benefits, and alternatives have been provided, discussed and informed consent has been obtained with: patient.    Plan discussed with CRNA.

## 2021-03-19 NOTE — ANESTHESIA PROCEDURE NOTES
Airway  Urgency: elective    Date/Time: 3/19/2021 9:15 AM  Airway not difficult    General Information and Staff    Patient location during procedure: OR  CRNA: Christian Tuttle CRNA    Indications and Patient Condition  Indications for airway management: airway protection    Preoxygenated: yes  MILS not maintained throughout  Mask difficulty assessment: 1 - vent by mask    Final Airway Details  Final airway type: endotracheal airway      Successful airway: ETT  Cuffed: yes   Successful intubation technique: direct laryngoscopy  Facilitating devices/methods: intubating stylet  Endotracheal tube insertion site: oral  Blade: Ian  Blade size: 4  ETT size (mm): 8.0  Cormack-Lehane Classification: grade IIa - partial view of glottis  Placement verified by: chest auscultation and capnometry   Cuff volume (mL): 7  Measured from: lips  ETT/EBT  to lips (cm): 22  Number of attempts at approach: 1  Assessment: lips, teeth, and gum same as pre-op and atraumatic intubation    Additional Comments  Negative epigastric sounds, Breath sound equal bilaterally with symmetric chest rise and fall

## 2021-03-19 NOTE — ANESTHESIA POSTPROCEDURE EVALUATION
Patient: Behzad Peoples    Procedure Summary     Date: 03/19/21 Room / [facility]:  [facility]    Anesthesia Start: 0909 Anesthesia Stop: 1157    Procedure: LUMBAR DECOMPRESSION L3, L4 (Bilateral Spine Lumbar) Diagnosis:       Degenerative disc disease, lumbar      (Degenerative disc disease, lumbar [M51.36])    Surgeons: Saran Quiros MD Provider: Rigo Veloz MD    Anesthesia Type: general ASA Status: 3          Anesthesia Type: general    Vitals  No vitals data found for the desired time range.          Post Anesthesia Care and Evaluation    Patient location during evaluation: PACU  Patient participation: waiting for patient participation  Level of consciousness: sleepy but conscious  Pain management: adequate  Airway patency: patent  Anesthetic complications: No anesthetic complications  PONV Status: none  Cardiovascular status: hemodynamically stable and acceptable  Respiratory status: nonlabored ventilation, acceptable, nasal cannula and oral airway  Hydration status: acceptable

## 2021-03-20 LAB
HCT VFR BLD AUTO: 40.5 % (ref 37.5–51)
HGB BLD-MCNC: 14 G/DL (ref 13–17.7)

## 2021-03-20 PROCEDURE — 97535 SELF CARE MNGMENT TRAINING: CPT

## 2021-03-20 PROCEDURE — 25010000003 POTASSIUM CHLORIDE PER 2 MEQ: Performed by: NEUROLOGICAL SURGERY

## 2021-03-20 PROCEDURE — 97161 PT EVAL LOW COMPLEX 20 MIN: CPT

## 2021-03-20 PROCEDURE — 97116 GAIT TRAINING THERAPY: CPT

## 2021-03-20 PROCEDURE — 85014 HEMATOCRIT: CPT | Performed by: NEUROLOGICAL SURGERY

## 2021-03-20 PROCEDURE — 97166 OT EVAL MOD COMPLEX 45 MIN: CPT

## 2021-03-20 PROCEDURE — 63710000001 DEXAMETHASONE PER 0.25 MG: Performed by: NEUROLOGICAL SURGERY

## 2021-03-20 PROCEDURE — 97110 THERAPEUTIC EXERCISES: CPT

## 2021-03-20 PROCEDURE — 85018 HEMOGLOBIN: CPT | Performed by: NEUROLOGICAL SURGERY

## 2021-03-20 PROCEDURE — 25010000003 CEFAZOLIN IN DEXTROSE 2-4 GM/100ML-% SOLUTION: Performed by: NEUROLOGICAL SURGERY

## 2021-03-20 RX ORDER — POLYETHYLENE GLYCOL 3350 17 G/17G
17 POWDER, FOR SOLUTION ORAL DAILY
Status: DISCONTINUED | OUTPATIENT
Start: 2021-03-20 | End: 2021-03-22 | Stop reason: HOSPADM

## 2021-03-20 RX ORDER — DIAZEPAM 5 MG/1
5 TABLET ORAL EVERY 6 HOURS PRN
Status: COMPLETED | OUTPATIENT
Start: 2021-03-20 | End: 2021-03-20

## 2021-03-20 RX ORDER — DOCUSATE SODIUM 100 MG/1
100 CAPSULE, LIQUID FILLED ORAL 2 TIMES DAILY
Status: DISCONTINUED | OUTPATIENT
Start: 2021-03-20 | End: 2021-03-20 | Stop reason: SDUPTHER

## 2021-03-20 RX ADMIN — BACLOFEN 10 MG: 10 TABLET ORAL at 12:52

## 2021-03-20 RX ADMIN — DOCUSATE SODIUM 100 MG: 100 CAPSULE, LIQUID FILLED ORAL at 08:38

## 2021-03-20 RX ADMIN — GABAPENTIN 100 MG: 100 CAPSULE ORAL at 20:28

## 2021-03-20 RX ADMIN — LISINOPRIL: 20 TABLET ORAL at 08:38

## 2021-03-20 RX ADMIN — OXYCODONE HYDROCHLORIDE AND ACETAMINOPHEN 2 TABLET: 7.5; 325 TABLET ORAL at 17:43

## 2021-03-20 RX ADMIN — BACLOFEN 10 MG: 10 TABLET ORAL at 20:29

## 2021-03-20 RX ADMIN — OXYCODONE HYDROCHLORIDE AND ACETAMINOPHEN 2 TABLET: 7.5; 325 TABLET ORAL at 06:29

## 2021-03-20 RX ADMIN — SODIUM CHLORIDE, PRESERVATIVE FREE 3 ML: 5 INJECTION INTRAVENOUS at 20:29

## 2021-03-20 RX ADMIN — GABAPENTIN 100 MG: 100 CAPSULE ORAL at 15:35

## 2021-03-20 RX ADMIN — OXYCODONE HYDROCHLORIDE AND ACETAMINOPHEN 2 TABLET: 7.5; 325 TABLET ORAL at 13:29

## 2021-03-20 RX ADMIN — ATORVASTATIN CALCIUM 20 MG: 20 TABLET, FILM COATED ORAL at 08:38

## 2021-03-20 RX ADMIN — POLYETHYLENE GLYCOL 3350 17 G: 17 POWDER, FOR SOLUTION ORAL at 08:37

## 2021-03-20 RX ADMIN — DOCUSATE SODIUM 100 MG: 100 CAPSULE, LIQUID FILLED ORAL at 20:29

## 2021-03-20 RX ADMIN — OXYCODONE HYDROCHLORIDE AND ACETAMINOPHEN 2 TABLET: 7.5; 325 TABLET ORAL at 02:04

## 2021-03-20 RX ADMIN — POTASSIUM CHLORIDE AND SODIUM CHLORIDE 75 ML/HR: 450; 150 INJECTION, SOLUTION INTRAVENOUS at 06:32

## 2021-03-20 RX ADMIN — CEFAZOLIN SODIUM 2 G: 2 INJECTION, SOLUTION INTRAVENOUS at 03:19

## 2021-03-20 RX ADMIN — FAMOTIDINE 40 MG: 20 TABLET, FILM COATED ORAL at 20:29

## 2021-03-20 RX ADMIN — DIAZEPAM 5 MG: 5 TABLET ORAL at 11:25

## 2021-03-20 RX ADMIN — DEXAMETHASONE 4 MG: 4 TABLET ORAL at 03:19

## 2021-03-20 RX ADMIN — Medication 1 TABLET: at 08:38

## 2021-03-20 RX ADMIN — OXYCODONE HYDROCHLORIDE AND ACETAMINOPHEN 1000 MG: 500 TABLET ORAL at 08:38

## 2021-03-20 RX ADMIN — BACLOFEN 10 MG: 10 TABLET ORAL at 06:29

## 2021-03-20 RX ADMIN — OXYCODONE HYDROCHLORIDE AND ACETAMINOPHEN 2 TABLET: 7.5; 325 TABLET ORAL at 21:31

## 2021-03-20 RX ADMIN — GABAPENTIN 100 MG: 100 CAPSULE ORAL at 03:19

## 2021-03-20 RX ADMIN — GABAPENTIN 100 MG: 100 CAPSULE ORAL at 08:38

## 2021-03-21 PROCEDURE — 97116 GAIT TRAINING THERAPY: CPT

## 2021-03-21 PROCEDURE — 99024 POSTOP FOLLOW-UP VISIT: CPT | Performed by: PHYSICIAN ASSISTANT

## 2021-03-21 PROCEDURE — 97110 THERAPEUTIC EXERCISES: CPT

## 2021-03-21 RX ORDER — ATORVASTATIN CALCIUM 20 MG/1
20 TABLET, FILM COATED ORAL NIGHTLY
Status: DISCONTINUED | OUTPATIENT
Start: 2021-03-21 | End: 2021-03-22 | Stop reason: HOSPADM

## 2021-03-21 RX ADMIN — BACLOFEN 10 MG: 10 TABLET ORAL at 06:30

## 2021-03-21 RX ADMIN — GABAPENTIN 100 MG: 100 CAPSULE ORAL at 08:57

## 2021-03-21 RX ADMIN — Medication 1 TABLET: at 08:56

## 2021-03-21 RX ADMIN — POLYETHYLENE GLYCOL 3350 17 G: 17 POWDER, FOR SOLUTION ORAL at 08:55

## 2021-03-21 RX ADMIN — OXYCODONE HYDROCHLORIDE AND ACETAMINOPHEN 2 TABLET: 7.5; 325 TABLET ORAL at 20:44

## 2021-03-21 RX ADMIN — ATORVASTATIN CALCIUM 20 MG: 20 TABLET, FILM COATED ORAL at 20:40

## 2021-03-21 RX ADMIN — BACLOFEN 10 MG: 10 TABLET ORAL at 22:03

## 2021-03-21 RX ADMIN — FAMOTIDINE 40 MG: 20 TABLET, FILM COATED ORAL at 20:40

## 2021-03-21 RX ADMIN — DOCUSATE SODIUM 100 MG: 100 CAPSULE, LIQUID FILLED ORAL at 20:40

## 2021-03-21 RX ADMIN — OXYCODONE HYDROCHLORIDE AND ACETAMINOPHEN 2 TABLET: 7.5; 325 TABLET ORAL at 06:30

## 2021-03-21 RX ADMIN — GABAPENTIN 100 MG: 100 CAPSULE ORAL at 14:31

## 2021-03-21 RX ADMIN — DOCUSATE SODIUM 100 MG: 100 CAPSULE, LIQUID FILLED ORAL at 08:56

## 2021-03-21 RX ADMIN — OXYCODONE HYDROCHLORIDE AND ACETAMINOPHEN 1000 MG: 500 TABLET ORAL at 08:57

## 2021-03-21 RX ADMIN — BACLOFEN 10 MG: 10 TABLET ORAL at 14:31

## 2021-03-21 RX ADMIN — GABAPENTIN 100 MG: 100 CAPSULE ORAL at 02:09

## 2021-03-21 RX ADMIN — ACETAMINOPHEN 500 MG: 500 TABLET, FILM COATED ORAL at 11:11

## 2021-03-21 RX ADMIN — GABAPENTIN 100 MG: 100 CAPSULE ORAL at 20:40

## 2021-03-21 RX ADMIN — OXYCODONE HYDROCHLORIDE AND ACETAMINOPHEN 2 TABLET: 7.5; 325 TABLET ORAL at 15:10

## 2021-03-21 RX ADMIN — OXYCODONE HYDROCHLORIDE AND ACETAMINOPHEN 2 TABLET: 7.5; 325 TABLET ORAL at 02:08

## 2021-03-21 RX ADMIN — SODIUM CHLORIDE, PRESERVATIVE FREE 3 ML: 5 INJECTION INTRAVENOUS at 08:55

## 2021-03-22 VITALS
DIASTOLIC BLOOD PRESSURE: 48 MMHG | HEIGHT: 73 IN | HEART RATE: 72 BPM | SYSTOLIC BLOOD PRESSURE: 100 MMHG | TEMPERATURE: 98.1 F | BODY MASS INDEX: 33.27 KG/M2 | WEIGHT: 251 LBS | OXYGEN SATURATION: 98 % | RESPIRATION RATE: 16 BRPM

## 2021-03-22 PROCEDURE — 97110 THERAPEUTIC EXERCISES: CPT

## 2021-03-22 PROCEDURE — 97116 GAIT TRAINING THERAPY: CPT

## 2021-03-22 PROCEDURE — 97535 SELF CARE MNGMENT TRAINING: CPT | Performed by: OCCUPATIONAL THERAPIST

## 2021-03-22 RX ORDER — BACLOFEN 10 MG/1
10 TABLET ORAL 3 TIMES DAILY
Qty: 45 TABLET | Refills: 0 | Status: SHIPPED | OUTPATIENT
Start: 2021-03-22 | End: 2021-04-06

## 2021-03-22 RX ORDER — GABAPENTIN 100 MG/1
100 CAPSULE ORAL 4 TIMES DAILY
Qty: 84 CAPSULE | Refills: 0 | Status: SHIPPED | OUTPATIENT
Start: 2021-03-22 | End: 2022-04-28

## 2021-03-22 RX ORDER — OXYCODONE AND ACETAMINOPHEN 7.5; 325 MG/1; MG/1
1 TABLET ORAL EVERY 6 HOURS PRN
Qty: 40 TABLET | Refills: 0 | Status: SHIPPED | OUTPATIENT
Start: 2021-03-22 | End: 2021-04-19

## 2021-03-22 RX ORDER — TRAMADOL HYDROCHLORIDE 50 MG/1
50 TABLET ORAL EVERY 4 HOURS PRN
Qty: 60 TABLET | Refills: 0 | Status: SHIPPED | OUTPATIENT
Start: 2021-03-22 | End: 2021-04-19

## 2021-03-22 RX ADMIN — ACETAMINOPHEN 500 MG: 500 TABLET, FILM COATED ORAL at 02:54

## 2021-03-22 RX ADMIN — OXYCODONE HYDROCHLORIDE AND ACETAMINOPHEN 1000 MG: 500 TABLET ORAL at 08:50

## 2021-03-22 RX ADMIN — POLYETHYLENE GLYCOL 3350 17 G: 17 POWDER, FOR SOLUTION ORAL at 08:50

## 2021-03-22 RX ADMIN — GABAPENTIN 100 MG: 100 CAPSULE ORAL at 08:51

## 2021-03-22 RX ADMIN — OXYCODONE HYDROCHLORIDE AND ACETAMINOPHEN 2 TABLET: 7.5; 325 TABLET ORAL at 10:56

## 2021-03-22 RX ADMIN — DOCUSATE SODIUM 100 MG: 100 CAPSULE, LIQUID FILLED ORAL at 08:50

## 2021-03-22 RX ADMIN — BACLOFEN 10 MG: 10 TABLET ORAL at 05:52

## 2021-03-22 RX ADMIN — ACETAMINOPHEN 500 MG: 500 TABLET, FILM COATED ORAL at 08:50

## 2021-03-22 RX ADMIN — GABAPENTIN 100 MG: 100 CAPSULE ORAL at 02:54

## 2021-03-22 RX ADMIN — Medication 1 TABLET: at 08:50

## 2021-03-24 ENCOUNTER — TELEPHONE (OUTPATIENT)
Dept: NEUROSURGERY | Facility: CLINIC | Age: 66
End: 2021-03-24

## 2021-03-24 NOTE — TELEPHONE ENCOUNTER
Tabitha returned call, I relayed the PA's message and she said she would change the bandage on Friday.

## 2021-03-24 NOTE — TELEPHONE ENCOUNTER
Provider:  Ishaan  Caller: Nurse Tabitha [facility]  Time of call:  12:17   Phone #:  [phone] (Tabitha's #)  Surgery:  Lumbar Decompression L3, L4  Surgery Date:  3/19/2021  Last visit:   2/25/2021  Next visit: 4/19/2021      Reason for call: Tabitha called regarding this patient's dressing. Tabitha would like to know when she can remove the dressing to look at the wound? Her next home visit with this patient is Friday, next Monday and next Thursday.

## 2021-04-19 ENCOUNTER — OFFICE VISIT (OUTPATIENT)
Dept: NEUROSURGERY | Facility: CLINIC | Age: 66
End: 2021-04-19

## 2021-04-19 VITALS
DIASTOLIC BLOOD PRESSURE: 64 MMHG | OXYGEN SATURATION: 97 % | RESPIRATION RATE: 20 BRPM | HEART RATE: 77 BPM | TEMPERATURE: 97.6 F | WEIGHT: 250.6 LBS | SYSTOLIC BLOOD PRESSURE: 102 MMHG | HEIGHT: 73 IN | BODY MASS INDEX: 33.21 KG/M2

## 2021-04-19 DIAGNOSIS — M51.36 DEGENERATIVE DISC DISEASE, LUMBAR: ICD-10-CM

## 2021-04-19 DIAGNOSIS — M48.062 SPINAL STENOSIS OF LUMBAR REGION WITH NEUROGENIC CLAUDICATION: Primary | ICD-10-CM

## 2021-04-19 PROCEDURE — 99024 POSTOP FOLLOW-UP VISIT: CPT | Performed by: PHYSICIAN ASSISTANT

## 2021-04-19 NOTE — PROGRESS NOTES
Behzad Brown [facility]  1955  04/19/2021  [id number]    CC: Mild back discomfort, numbness is coming and going in the feet    HPI:  S/P laminectomies and medial facetectomies of L3-4 and L4-5 for spinal stenosis with associated symptoms of neurogenic claudication.  The patient reports that he has had significant resolution of his preoperative symptoms.  He is doing a home Mat[employer] exercise program with physical therapy and doing a walking program.  He is doing exercises every day.  He still has some numbness down into his feet but does report that he has found times that the numbness is completely gone sometimes it will be in the left foot sometimes in the right foot, I have explained to him that this is most likely due to his level of activities.    Past Medical History:   Diagnosis Date   • Arthritis    • Bronchitis    • GERD (gastroesophageal reflux disease)    • History of SCC (squamous cell carcinoma) of skin    • Hyperlipidemia    • Hypertension    • Melanoma (CMS/HCC)    • Melanoma (CMS/HCC)     scalp   • Wears glasses        Allergies   Allergen Reactions   • Bee Venom Anaphylaxis     Takes shots to prevent   • Omeprazole Rash   • Protonix [Pantoprazole] Rash     Any of the omeprazole drugs   • Sulfa Antibiotics Rash     Pt can't remember reaction         Current Outpatient Medications:   •  ascorbic acid (VITAMIN C) 1000 MG tablet, Take 1,000 mg by mouth 2 (two) times a day., Disp: , Rfl:   •  atorvastatin (LIPITOR) 20 MG tablet, Take 20 mg by mouth Daily., Disp: , Rfl:   •  famotidine (PEPCID) 40 MG tablet, 40 mg Every Night., Disp: , Rfl:   •  lisinopril-hydrochlorothiazide (PRINZIDE,ZESTORETIC) 20-12.5 MG per tablet, Take 1 tablet by mouth 2 (two) times a day., Disp: , Rfl:   •  multivitamin with minerals (MULTIVITAMIN ADULT PO), Take 1 tablet by mouth Daily., Disp: , Rfl:   •  gabapentin (Neurontin) 100 MG capsule, Take 1 capsule by mouth 4 (Four) Times a Day for 21 days., Disp: 84 capsule, Rfl:  "0    Review of Systems      PE:  /64   Pulse 77   Temp 97.6 °F (36.4 °C) (Temporal)   Resp 20   Ht 185.4 cm (72.99\")   Wt 114 kg (250 lb 9.6 oz)   SpO2 97%   BMI 33.07 kg/m²   Heart- RRR  Lungs- no wheezing, normal expansion    Wound-healing well    Neurologic Exam   Motor examination is intact in the lower extremities.  Gait is normal    MDM   Status post laminectomy, he is doing exercises and walking on a daily basis we discussed limitations of bending, no twisting, lifting.  All of his questions were answered  Activities and restrictions were discussed.  Wound care was discussed with the patient.  I have given him a prescription for outpatient physical therapy, tomorrow will be his last home health visit.  I will see him back in the office in 6 weeks.    Nehal Azul, PAC    "

## 2021-05-18 ENCOUNTER — TELEPHONE (OUTPATIENT)
Dept: NEUROSURGERY | Facility: CLINIC | Age: 66
End: 2021-05-18

## 2021-05-18 NOTE — TELEPHONE ENCOUNTER
Caller: KEEGAN BURT    Relationship to patient: PT/SELF    Best call back number: [phone]    Patient is needing: PT IS REQUESTING TO S/W DIANE GUTIERREZ REGARDING HOW LONG HE SHOULD WAIT TO HAVE HIS HERNIA SURGERY FOLLOWING THE RECENT SURGERY WITH DR CHEN THAT WAS ON 03/19/2021.    PLEASE CONTACT PT REGARDING THIS MATTER, QUESTIONS OR CONCERNS.    THANK YOU!

## 2021-05-18 NOTE — TELEPHONE ENCOUNTER
Ishaan patient.  Surgery: LUMBAR DECOMPRESSION L3, L4  Surgery Date: 03/19/2021  Last visit: 04/19/2021

## 2021-06-01 ENCOUNTER — OFFICE VISIT (OUTPATIENT)
Dept: NEUROSURGERY | Facility: CLINIC | Age: 66
End: 2021-06-01

## 2021-06-01 VITALS
BODY MASS INDEX: 33.16 KG/M2 | HEART RATE: 70 BPM | HEIGHT: 73 IN | RESPIRATION RATE: 20 BRPM | DIASTOLIC BLOOD PRESSURE: 68 MMHG | TEMPERATURE: 97.4 F | OXYGEN SATURATION: 98 % | SYSTOLIC BLOOD PRESSURE: 118 MMHG | WEIGHT: 250.2 LBS

## 2021-06-01 DIAGNOSIS — G56.03 BILATERAL CARPAL TUNNEL SYNDROME: ICD-10-CM

## 2021-06-01 DIAGNOSIS — R20.0 NUMBNESS AND TINGLING: ICD-10-CM

## 2021-06-01 DIAGNOSIS — R20.2 NUMBNESS AND TINGLING: ICD-10-CM

## 2021-06-01 DIAGNOSIS — M51.36 DEGENERATIVE DISC DISEASE, LUMBAR: Primary | ICD-10-CM

## 2021-06-01 PROBLEM — M48.062 SPINAL STENOSIS OF LUMBAR REGION WITH NEUROGENIC CLAUDICATION: Status: RESOLVED | Noted: 2021-02-25 | Resolved: 2021-06-01

## 2021-06-01 PROCEDURE — 99024 POSTOP FOLLOW-UP VISIT: CPT | Performed by: PHYSICIAN ASSISTANT

## 2021-06-01 RX ORDER — FAMOTIDINE 40 MG/1
1 TABLET, FILM COATED ORAL
COMMUNITY
Start: 2021-01-19 | End: 2021-06-01 | Stop reason: SDUPTHER

## 2021-06-01 RX ORDER — NABUMETONE 750 MG/1
750 TABLET, FILM COATED ORAL 2 TIMES DAILY
COMMUNITY
End: 2022-05-05 | Stop reason: SDUPTHER

## 2021-06-01 RX ORDER — FLUTICASONE PROPIONATE 50 MCG
SPRAY, SUSPENSION (ML) NASAL
COMMUNITY
Start: 2021-03-23 | End: 2022-11-07

## 2021-06-01 NOTE — PROGRESS NOTES
Behzda Brown [facility]  1955  06/01/2021  [id number]    CC: some, occasional tingling into the shin and lateral left lower leg to bottom of foot.    HPI:  S/P laminectomies and medial facetectomies L3-4 and L4-5 on 3/19/2021.  Patient has done well after decompression and has progressed his activities and is participating with physical therapy and a walking program on a daily basis.  He typically wakes up in the morning without any symptoms in his lower extremity.  He reports that after physical therapy he feels the best he ever has.    Past Medical History:   Diagnosis Date   • Arthritis    • Bronchitis    • GERD (gastroesophageal reflux disease)    • History of SCC (squamous cell carcinoma) of skin    • Hyperlipidemia    • Hypertension    • Melanoma (CMS/HCC)    • Melanoma (CMS/HCC)     scalp   • Wears glasses        Allergies   Allergen Reactions   • Bee Venom Anaphylaxis     Takes shots to prevent   • Omeprazole Rash   • Protonix [Pantoprazole] Rash     Any of the omeprazole drugs   • Sulfa Antibiotics Rash     Pt can't remember reaction         Current Outpatient Medications:   •  ascorbic acid (VITAMIN C) 1000 MG tablet, Take 1,000 mg by mouth 2 (two) times a day., Disp: , Rfl:   •  atorvastatin (LIPITOR) 20 MG tablet, Take 20 mg by mouth Daily., Disp: , Rfl:   •  Bioflavonoid Products (Vitamin C Plus) 1000 MG tablet, Take  by mouth., Disp: , Rfl:   •  famotidine (PEPCID) 40 MG tablet, 40 mg Every Night., Disp: , Rfl:   •  famotidine (PEPCID) 40 MG tablet, Take 1 tablet by mouth., Disp: , Rfl:   •  fluticasone (FLONASE) 50 MCG/ACT nasal spray, spray 1 spray by intranasal route  every day in each nostril, Disp: , Rfl:   •  lisinopril-hydrochlorothiazide (PRINZIDE,ZESTORETIC) 20-12.5 MG per tablet, Take 1 tablet by mouth 2 (two) times a day., Disp: , Rfl:   •  multivitamin with minerals (MULTIVITAMIN ADULT PO), Take 1 tablet by mouth Daily., Disp: , Rfl:   •  nabumetone (RELAFEN) 750 MG tablet, Take 750 mg  by mouth 2 (Two) Times a Day., Disp: , Rfl:   •  gabapentin (Neurontin) 100 MG capsule, Take 1 capsule by mouth 4 (Four) Times a Day for 21 days., Disp: 84 capsule, Rfl: 0    Review of Systems   Constitutional: Negative for activity change, appetite change, chills, diaphoresis, fatigue, fever and unexpected weight change.   HENT: Negative for congestion, dental problem, drooling, ear discharge, ear pain, facial swelling, hearing loss, mouth sores, nosebleeds, postnasal drip, rhinorrhea, sinus pressure, sinus pain, sneezing, sore throat, tinnitus, trouble swallowing and voice change.    Eyes: Negative for photophobia, pain, discharge, redness, itching and visual disturbance.   Respiratory: Negative for apnea, cough, choking, chest tightness, shortness of breath, wheezing and stridor.    Cardiovascular: Negative for chest pain, palpitations and leg swelling.   Gastrointestinal: Negative for abdominal distention, abdominal pain, anal bleeding, blood in stool, constipation, diarrhea, nausea, rectal pain and vomiting.   Endocrine: Negative for cold intolerance, heat intolerance, polydipsia, polyphagia and polyuria.   Genitourinary: Negative for decreased urine volume, difficulty urinating, dysuria, enuresis, flank pain, frequency, genital sores, hematuria and urgency.   Musculoskeletal: Positive for back pain. Negative for arthralgias, gait problem, joint swelling, myalgias, neck pain and neck stiffness.   Skin: Negative for color change, pallor, rash and wound.   Allergic/Immunologic: Negative for environmental allergies, food allergies and immunocompromised state.   Neurological: Positive for numbness. Negative for dizziness, tremors, seizures, syncope, facial asymmetry, speech difficulty, weakness, light-headedness and headaches.   Hematological: Negative for adenopathy. Does not bruise/bleed easily.   Psychiatric/Behavioral: Negative for agitation, behavioral problems, confusion, decreased concentration, dysphoric  "mood, hallucinations, self-injury, sleep disturbance and suicidal ideas. The patient is not nervous/anxious and is not hyperactive.          PE:  /68   Pulse 70   Temp 97.4 °F (36.3 °C)   Resp 20   Ht 185.4 cm (73\")   Wt 113 kg (250 lb 3.2 oz)   SpO2 98%   BMI 33.01 kg/m²   Heart- RRR  Lungs- no wheezing, normal expansion    Wound-well-healed.    Neurologic Exam   Normal gait    MDM   Activities and restrictions were discussed.  Mr. Peoples continues to progress well with his activities and physical therapy.  We have discussed progressing his home therapy to include floor exercises and stretching.  The symptoms in the left lower extremity are mild and sporadic most likely due to increased activities.  From a neurosurgical perspective he has done well.  No formal follow-up is scheduled at this time.  We discussed symptoms related to his spine that would cause him to call.     The patient does have bilateral carpal tunnel syndrome and he will give us a call when he is ready to proceed with surgical intervention.    It is been a pleasure providing neurosurgical care.    Nehal Azul, PAC    "

## 2021-08-24 ENCOUNTER — PRE-ADMISSION TESTING (OUTPATIENT)
Dept: PREADMISSION TESTING | Facility: HOSPITAL | Age: 66
End: 2021-08-24

## 2021-08-24 LAB
ANION GAP SERPL CALCULATED.3IONS-SCNC: 10 MMOL/L (ref 5–15)
BUN SERPL-MCNC: 13 MG/DL (ref 8–23)
BUN/CREAT SERPL: 11.3 (ref 7–25)
CALCIUM SPEC-SCNC: 9.5 MG/DL (ref 8.6–10.5)
CHLORIDE SERPL-SCNC: 105 MMOL/L (ref 98–107)
CO2 SERPL-SCNC: 25 MMOL/L (ref 22–29)
CREAT SERPL-MCNC: 1.15 MG/DL (ref 0.76–1.27)
DEPRECATED RDW RBC AUTO: 43.4 FL (ref 37–54)
ERYTHROCYTE [DISTWIDTH] IN BLOOD BY AUTOMATED COUNT: 12.9 % (ref 12.3–15.4)
GFR SERPL CREATININE-BSD FRML MDRD: 64 ML/MIN/1.73
GLUCOSE SERPL-MCNC: 126 MG/DL (ref 65–99)
HCT VFR BLD AUTO: 42.3 % (ref 37.5–51)
HGB BLD-MCNC: 14.8 G/DL (ref 13–17.7)
MCH RBC QN AUTO: 32.2 PG (ref 26.6–33)
MCHC RBC AUTO-ENTMCNC: 35 G/DL (ref 31.5–35.7)
MCV RBC AUTO: 92 FL (ref 79–97)
PLATELET # BLD AUTO: 288 10*3/MM3 (ref 140–450)
PMV BLD AUTO: 9.7 FL (ref 6–12)
POTASSIUM SERPL-SCNC: 3.8 MMOL/L (ref 3.5–5.2)
RBC # BLD AUTO: 4.6 10*6/MM3 (ref 4.14–5.8)
SARS-COV-2 RNA PNL SPEC NAA+PROBE: NOT DETECTED
SODIUM SERPL-SCNC: 140 MMOL/L (ref 136–145)
WBC # BLD AUTO: 5.21 10*3/MM3 (ref 3.4–10.8)

## 2021-08-24 PROCEDURE — 80048 BASIC METABOLIC PNL TOTAL CA: CPT

## 2021-08-24 PROCEDURE — 85027 COMPLETE CBC AUTOMATED: CPT

## 2021-08-24 PROCEDURE — U0004 COV-19 TEST NON-CDC HGH THRU: HCPCS

## 2021-08-24 PROCEDURE — C9803 HOPD COVID-19 SPEC COLLECT: HCPCS

## 2021-08-24 PROCEDURE — 36415 COLL VENOUS BLD VENIPUNCTURE: CPT

## 2022-04-28 ENCOUNTER — OFFICE VISIT (OUTPATIENT)
Dept: FAMILY MEDICINE CLINIC | Facility: CLINIC | Age: 67
End: 2022-04-28

## 2022-04-28 VITALS
HEIGHT: 73 IN | RESPIRATION RATE: 18 BRPM | TEMPERATURE: 98.3 F | SYSTOLIC BLOOD PRESSURE: 132 MMHG | BODY MASS INDEX: 32.68 KG/M2 | DIASTOLIC BLOOD PRESSURE: 84 MMHG | OXYGEN SATURATION: 98 % | HEART RATE: 67 BPM | WEIGHT: 246.6 LBS

## 2022-04-28 DIAGNOSIS — M25.511 ACUTE PAIN OF RIGHT SHOULDER: Primary | ICD-10-CM

## 2022-04-28 PROCEDURE — 99212 OFFICE O/P EST SF 10 MIN: CPT | Performed by: PHYSICIAN ASSISTANT

## 2022-04-28 RX ORDER — FAMOTIDINE 40 MG/1
1 TABLET, FILM COATED ORAL
COMMUNITY
Start: 2022-01-18 | End: 2022-04-28 | Stop reason: SDUPTHER

## 2022-04-28 NOTE — PROGRESS NOTES
"Chief Complaint  Shoulder Pain (Right)    Subjective          Behzad Peoples presents to [facility]  History of Present Illness   Patient presents the office complaining that his right shoulder is painful.  He states that he believes he tore his rotator cuff in the 80s and is never done anything about it since.  He states he has been shovelling compost for a week and his shoulder started hurting he states that the pain is not gone away for a week.  He states that he can be resting in his will have pain throb all the way down his arm.  He states that when his he tries to raise his shoulder is most painful he has not taken any ibuprofen or Tylenol but does take Relafen for arthritis he has had some numbness and tingling along the arm  Objective   Vital Signs:   /84   Pulse 67   Temp 98.3 °F (36.8 °C)   Resp 18   Ht 185.4 cm (72.99\")   Wt 112 kg (246 lb 9.6 oz)   SpO2 98%   BMI 32.54 kg/m²     Physical Exam  Vitals reviewed.   Constitutional:       Appearance: Normal appearance.   HENT:      Head: Normocephalic.      Right Ear: Tympanic membrane, ear canal and external ear normal.      Left Ear: Tympanic membrane, ear canal and external ear normal. There is no impacted cerumen.      Nose: Nose normal.      Mouth/Throat:      Mouth: Mucous membranes are moist.   Eyes:      Pupils: Pupils are equal, round, and reactive to light.   Cardiovascular:      Rate and Rhythm: Normal rate and regular rhythm.      Heart sounds: Normal heart sounds.   Pulmonary:      Effort: Pulmonary effort is normal.      Breath sounds: Normal breath sounds.   Musculoskeletal:        Arms:    Neurological:      General: No focal deficit present.      Mental Status: He is alert.   Psychiatric:         Mood and Affect: Mood normal.        Result Review :                 Assessment and Plan    Diagnoses and all orders for this visit:    1. Acute pain of right shoulder (Primary)  -     XR Shoulder 2+ " View Right (In Office)    Will obtain x-ray of shoulder.  We will follow-up with MRI or physical therapy as prompted by x-ray.  Encouraged RICE           Follow Up   No follow-ups on file.  Patient was given instructions and counseling regarding his condition or for health maintenance advice. Please see specific information pulled into the AVS if appropriate.

## 2022-04-29 ENCOUNTER — TELEPHONE (OUTPATIENT)
Dept: FAMILY MEDICINE CLINIC | Facility: CLINIC | Age: 67
End: 2022-04-29

## 2022-05-03 DIAGNOSIS — M25.511 ACUTE PAIN OF RIGHT SHOULDER: Primary | ICD-10-CM

## 2022-05-04 ENCOUNTER — TELEPHONE (OUTPATIENT)
Dept: FAMILY MEDICINE CLINIC | Facility: CLINIC | Age: 67
End: 2022-05-04

## 2022-11-02 ENCOUNTER — TELEPHONE (OUTPATIENT)
Dept: FAMILY MEDICINE CLINIC | Facility: CLINIC | Age: 67
End: 2022-11-02

## 2022-11-02 DIAGNOSIS — Z12.5 PROSTATE CANCER SCREENING: ICD-10-CM

## 2022-11-02 DIAGNOSIS — Z00.00 ROUTINE MEDICAL EXAM: ICD-10-CM

## 2022-11-02 DIAGNOSIS — I10 PRIMARY HYPERTENSION: Primary | ICD-10-CM

## 2022-11-02 RX ORDER — LISINOPRIL AND HYDROCHLOROTHIAZIDE 20; 12.5 MG/1; MG/1
1 TABLET ORAL DAILY
Qty: 90 TABLET | Refills: 0 | Status: SHIPPED | OUTPATIENT
Start: 2022-11-02 | End: 2023-02-06 | Stop reason: SDUPTHER

## 2022-11-02 RX ORDER — NABUMETONE 750 MG/1
750 TABLET, FILM COATED ORAL 2 TIMES DAILY
Qty: 180 TABLET | Refills: 0 | Status: SHIPPED | OUTPATIENT
Start: 2022-11-02 | End: 2023-02-06 | Stop reason: SDUPTHER

## 2022-11-02 RX ORDER — FAMOTIDINE 40 MG/1
40 TABLET, FILM COATED ORAL NIGHTLY
Qty: 90 TABLET | Refills: 0 | Status: SHIPPED | OUTPATIENT
Start: 2022-11-02

## 2022-11-02 NOTE — TELEPHONE ENCOUNTER
Caller: Kiley Spaulding - [facility] [address] - [phone]  - [phone]    Relationship: Pharmacy    Best call back number: [phone]    What was the call regarding:   [facility] STATED THAT PATIENT STATED THAT HE SPOKE WITH ANYI SOOD MD ABOUT GOING BACK ON MEDICATION TWO TIMES A DAY INSTEAD OF ONE TIME A DAY AND [facility]ILEY [facility]PAUL[facility]ING WOULD LIKE TO BE INFORMED IF ANYI SOOD MD WOULD LIKE FOR MEDICATION INSTRUCTION'S TO BE TWO TIMES A DAY OR ONE TIME A DAY   lisinopril-hydrochlorothiazide (PRINZIDE,ZESTORETIC) 20-12.5 MG per tablet    Do you require a callback:YES

## 2022-11-03 ENCOUNTER — LAB (OUTPATIENT)
Dept: FAMILY MEDICINE CLINIC | Facility: CLINIC | Age: 67
End: 2022-11-03

## 2022-11-03 DIAGNOSIS — Z00.00 ROUTINE MEDICAL EXAM: ICD-10-CM

## 2022-11-03 DIAGNOSIS — Z12.5 PROSTATE CANCER SCREENING: ICD-10-CM

## 2022-11-03 DIAGNOSIS — I10 PRIMARY HYPERTENSION: ICD-10-CM

## 2022-11-03 PROCEDURE — 36415 COLL VENOUS BLD VENIPUNCTURE: CPT | Performed by: FAMILY MEDICINE

## 2022-11-04 LAB
ALBUMIN SERPL-MCNC: 4.6 G/DL (ref 3.8–4.8)
ALBUMIN/GLOB SERPL: 1.9 {RATIO} (ref 1.2–2.2)
ALP SERPL-CCNC: 66 IU/L (ref 44–121)
ALT SERPL-CCNC: 26 IU/L (ref 0–44)
AST SERPL-CCNC: 19 IU/L (ref 0–40)
BASOPHILS # BLD AUTO: 0.1 X10E3/UL (ref 0–0.2)
BASOPHILS NFR BLD AUTO: 1 %
BILIRUB SERPL-MCNC: 0.3 MG/DL (ref 0–1.2)
BUN SERPL-MCNC: 14 MG/DL (ref 8–27)
BUN/CREAT SERPL: 13 (ref 10–24)
CALCIUM SERPL-MCNC: 9.7 MG/DL (ref 8.6–10.2)
CHLORIDE SERPL-SCNC: 98 MMOL/L (ref 96–106)
CHOLEST SERPL-MCNC: 260 MG/DL (ref 100–199)
CO2 SERPL-SCNC: 23 MMOL/L (ref 20–29)
CREAT SERPL-MCNC: 1.07 MG/DL (ref 0.76–1.27)
EGFRCR SERPLBLD CKD-EPI 2021: 77 ML/MIN/1.73
EOSINOPHIL # BLD AUTO: 0.2 X10E3/UL (ref 0–0.4)
EOSINOPHIL NFR BLD AUTO: 2 %
ERYTHROCYTE [DISTWIDTH] IN BLOOD BY AUTOMATED COUNT: 13 % (ref 11.6–15.4)
GLOBULIN SER CALC-MCNC: 2.4 G/DL (ref 1.5–4.5)
GLUCOSE SERPL-MCNC: 100 MG/DL (ref 70–99)
HCT VFR BLD AUTO: 49 % (ref 37.5–51)
HDLC SERPL-MCNC: 37 MG/DL
HGB BLD-MCNC: 16.6 G/DL (ref 13–17.7)
IMM GRANULOCYTES # BLD AUTO: 0.1 X10E3/UL (ref 0–0.1)
IMM GRANULOCYTES NFR BLD AUTO: 1 %
LDLC SERPL CALC-MCNC: 171 MG/DL (ref 0–99)
LYMPHOCYTES # BLD AUTO: 2.1 X10E3/UL (ref 0.7–3.1)
LYMPHOCYTES NFR BLD AUTO: 22 %
MCH RBC QN AUTO: 31.6 PG (ref 26.6–33)
MCHC RBC AUTO-ENTMCNC: 33.9 G/DL (ref 31.5–35.7)
MCV RBC AUTO: 93 FL (ref 79–97)
MONOCYTES # BLD AUTO: 0.9 X10E3/UL (ref 0.1–0.9)
MONOCYTES NFR BLD AUTO: 9 %
NEUTROPHILS # BLD AUTO: 6.1 X10E3/UL (ref 1.4–7)
NEUTROPHILS NFR BLD AUTO: 65 %
PLATELET # BLD AUTO: 349 X10E3/UL (ref 150–450)
POTASSIUM SERPL-SCNC: 4.2 MMOL/L (ref 3.5–5.2)
PROT SERPL-MCNC: 7 G/DL (ref 6–8.5)
PSA SERPL-MCNC: 0.5 NG/ML (ref 0–4)
RBC # BLD AUTO: 5.26 X10E6/UL (ref 4.14–5.8)
SODIUM SERPL-SCNC: 136 MMOL/L (ref 134–144)
TRIGL SERPL-MCNC: 272 MG/DL (ref 0–149)
VLDLC SERPL CALC-MCNC: 52 MG/DL (ref 5–40)
WBC # BLD AUTO: 9.4 X10E3/UL (ref 3.4–10.8)

## 2022-11-05 LAB — TSH SERPL DL<=0.005 MIU/L-ACNC: 1.41 UIU/ML (ref 0.45–4.5)

## 2022-11-07 ENCOUNTER — OFFICE VISIT (OUTPATIENT)
Dept: FAMILY MEDICINE CLINIC | Facility: CLINIC | Age: 67
End: 2022-11-07

## 2022-11-07 VITALS
OXYGEN SATURATION: 98 % | HEIGHT: 73 IN | WEIGHT: 241.31 LBS | SYSTOLIC BLOOD PRESSURE: 114 MMHG | DIASTOLIC BLOOD PRESSURE: 64 MMHG | BODY MASS INDEX: 31.98 KG/M2 | HEART RATE: 68 BPM

## 2022-11-07 DIAGNOSIS — J30.2 SEASONAL ALLERGIC RHINITIS, UNSPECIFIED TRIGGER: ICD-10-CM

## 2022-11-07 DIAGNOSIS — G56.03 BILATERAL CARPAL TUNNEL SYNDROME: ICD-10-CM

## 2022-11-07 DIAGNOSIS — Z71.89 ADVANCED DIRECTIVES, COUNSELING/DISCUSSION: ICD-10-CM

## 2022-11-07 DIAGNOSIS — Z23 NEEDS FLU SHOT: ICD-10-CM

## 2022-11-07 DIAGNOSIS — Z12.5 SCREENING FOR PROSTATE CANCER: ICD-10-CM

## 2022-11-07 DIAGNOSIS — I10 BENIGN ESSENTIAL HTN: ICD-10-CM

## 2022-11-07 DIAGNOSIS — E78.2 MIXED HYPERLIPIDEMIA: ICD-10-CM

## 2022-11-07 DIAGNOSIS — M50.30 DEGENERATIVE DISC DISEASE, CERVICAL: ICD-10-CM

## 2022-11-07 DIAGNOSIS — Z12.11 SCREENING FOR COLON CANCER: ICD-10-CM

## 2022-11-07 DIAGNOSIS — M51.36 DEGENERATIVE DISC DISEASE, LUMBAR: ICD-10-CM

## 2022-11-07 DIAGNOSIS — Z11.59 NEED FOR HEPATITIS C SCREENING TEST: ICD-10-CM

## 2022-11-07 DIAGNOSIS — Z00.00 INITIAL MEDICARE ANNUAL WELLNESS VISIT: Primary | ICD-10-CM

## 2022-11-07 DIAGNOSIS — K21.9 GASTROESOPHAGEAL REFLUX DISEASE, UNSPECIFIED WHETHER ESOPHAGITIS PRESENT: ICD-10-CM

## 2022-11-07 PROBLEM — J30.9 ALLERGIC RHINITIS: Status: ACTIVE | Noted: 2022-11-07

## 2022-11-07 PROCEDURE — G0402 INITIAL PREVENTIVE EXAM: HCPCS | Performed by: NURSE PRACTITIONER

## 2022-11-07 PROCEDURE — 1159F MED LIST DOCD IN RCRD: CPT | Performed by: NURSE PRACTITIONER

## 2022-11-07 PROCEDURE — 99213 OFFICE O/P EST LOW 20 MIN: CPT | Performed by: NURSE PRACTITIONER

## 2022-11-07 PROCEDURE — 1170F FXNL STATUS ASSESSED: CPT | Performed by: NURSE PRACTITIONER

## 2022-11-07 NOTE — PROGRESS NOTES
The ABCs of the Annual Wellness Visit  Initial Medicare Wellness Visit    Chief Complaint   Patient presents with   • Medicare Wellness-Initial Visit     Subjective   History of Present Illness:  Behzad Peoples is a 67 y.o. male who presents for an Initial Medicare Wellness Visit.    The following portions of the patient's history were reviewed and   updated as appropriate: allergies, current medications, past family history, past medical history, past social history, past surgical history and problem list.     Compared to one year ago, the patient feels his physical   health is the same.    Compared to one year ago, the patient feels his mental   health is the same.    Doing well on medication.  Hypertension well-controlled on lisinopril hydrochlorothiazide, heartburn well controlled on famotidine, arthralgias well controlled on nabumetone.  No smoking no alcohol use.  Due colonoscopy so we will get that scheduled.  States he has had a tetanus shot within 5 years and he denies all other immunizations including pneumonia shingles COVID and flu understands the risk of not doing them.  History of low back pain and has had a laminectomy at L3 and L4.  States it greatly helped but states he does at times still have some numbness and tingling in his feet.  States overall though his back is not an issue for him.    Had recent labs drawn a couple days ago so here to discuss those results.    Sees no specialist.  No dizziness no headache no chest pain no chest pressure no shortness of breath no trouble breathing no urinary or bowel issues.    Recent Hospitalizations:  He was not admitted to the hospital during the last year.       Current Medical Providers:  Patient Care Team:  Rigo Mustafa MD as PCP - General (Family Medicine)  Alvin Terry MD as Consulting Physician (Anesthesiology)    Outpatient Medications Prior to Visit   Medication Sig Dispense Refill   • ascorbic acid (VITAMIN C) 1000 MG tablet Take  1,000 mg by mouth 2 (two) times a day.     • famotidine (PEPCID) 40 MG tablet Take 1 tablet by mouth Every Night. 90 tablet 0   • lisinopril-hydrochlorothiazide (PRINZIDE,ZESTORETIC) 20-12.5 MG per tablet Take 1 tablet by mouth Daily. 90 tablet 0   • multivitamin with minerals tablet tablet Take 1 tablet by mouth Daily.     • nabumetone (RELAFEN) 750 MG tablet Take 1 tablet by mouth 2 (Two) Times a Day. 180 tablet 0   • atorvastatin (LIPITOR) 20 MG tablet Take 20 mg by mouth Daily.     • Cholecalciferol 25 MCG (1000 UT) capsule Take 1 capsule by mouth.     • fluticasone (FLONASE) 50 MCG/ACT nasal spray spray 1 spray by intranasal route  every day in each nostril       No facility-administered medications prior to visit.       No opioid medication identified on active medication list. I have reviewed chart for other potential  high risk medication/s and harmful drug interactions in the elderly.          Aspirin is not on active medication list.  Aspirin use is not indicated based on review of current medical condition/s. Risk of harm outweighs potential benefits.  .    Patient Active Problem List   Diagnosis   • Degenerative disc disease, lumbar   • Herniated lumbar intervertebral disc   • Numbness and tingling   • Degenerative disc disease, cervical   • Bilateral carpal tunnel syndrome   • Bulging of cervical intervertebral disc   • GERD (gastroesophageal reflux disease)   • Advanced directives, counseling/discussion   • Screening for prostate cancer   • Screening for colon cancer   • Need for hepatitis C screening test   • Mixed hyperlipidemia   • Allergic rhinitis   • Initial Medicare annual wellness visit   • Benign essential HTN   • Needs flu shot     Advance Care Planning  Advance Directive is on file.  ACP discussion was held with the patient during this visit. Patient has an advance directive in EMR which is still valid.     Review of Systems   Constitutional: Negative for chills, fatigue and fever.   HENT:  "Negative for congestion, sinus pressure, sneezing and sore throat.    Eyes: Negative for visual disturbance.   Respiratory: Negative.    Cardiovascular: Negative.    Gastrointestinal: Negative.    Genitourinary: Negative for decreased urine volume, dysuria, frequency, hematuria and urgency.   Musculoskeletal: Positive for arthralgias and back pain. Negative for gait problem, joint swelling and neck pain.   Skin: Negative for rash and wound.   Neurological: Negative.    Psychiatric/Behavioral: Negative.         Objective       Vitals:    11/07/22 0838   BP: 114/64   Pulse: 68   SpO2: 98%   Weight: 109 kg (241 lb 5 oz)   Height: 185.4 cm (73\")     Estimated body mass index is 31.84 kg/m² as calculated from the following:    Height as of this encounter: 185.4 cm (73\").    Weight as of this encounter: 109 kg (241 lb 5 oz).    BMI is >= 30 and <35. (Class 1 Obesity). The following options were offered after discussion;: exercise counseling/recommendations and nutrition counseling/recommendations      Does the patient have evidence of cognitive impairment? No    Physical Exam  Constitutional:       Appearance: Normal appearance.   HENT:      Head: Normocephalic.      Right Ear: Tympanic membrane, ear canal and external ear normal.      Left Ear: Tympanic membrane, ear canal and external ear normal.      Nose: Nose normal.      Mouth/Throat:      Mouth: Mucous membranes are moist.      Pharynx: Oropharynx is clear.   Eyes:      Extraocular Movements: Extraocular movements intact.      Conjunctiva/sclera: Conjunctivae normal.      Pupils: Pupils are equal, round, and reactive to light.   Cardiovascular:      Rate and Rhythm: Normal rate and regular rhythm.      Heart sounds: Normal heart sounds.   Pulmonary:      Effort: Pulmonary effort is normal.      Breath sounds: Normal breath sounds.   Abdominal:      General: Abdomen is flat. Bowel sounds are normal.      Palpations: Abdomen is soft.   Musculoskeletal:         " General: Normal range of motion.      Cervical back: Normal range of motion.   Skin:     General: Skin is warm.      Findings: No erythema or rash.   Neurological:      General: No focal deficit present.      Mental Status: He is alert and oriented to person, place, and time. Mental status is at baseline.   Psychiatric:         Mood and Affect: Mood normal.         Behavior: Behavior normal.         Thought Content: Thought content normal.         Judgment: Judgment normal.       Lab Results   Component Value Date    CHLPL 260 (H) 11/03/2022    TRIG 272 (H) 11/03/2022    HDL 37 (L) 11/03/2022     (H) 11/03/2022    VLDL 52 (H) 11/03/2022          HEALTH RISK ASSESSMENT    Smoking Status:  Social History     Tobacco Use   Smoking Status Never   Smokeless Tobacco Never     Alcohol Consumption:  Social History     Substance and Sexual Activity   Alcohol Use Yes    Comment: 1 drink a week     Fall Risk Screen:    CINDY Fall Risk Assessment was completed, and patient is at LOW risk for falls.Assessment completed on:11/7/2022    Depression Screen:   PHQ-2/PHQ-9 Depression Screening 11/7/2022   Little Interest or Pleasure in Doing Things 0-->not at all   Feeling Down, Depressed or Hopeless 0-->not at all   PHQ-9: Brief Depression Severity Measure Score 0       Health Habits and Functional and Cognitive Screening:  Functional & Cognitive Status 11/7/2022   Do you have difficulty preparing food and eating? No   Do you have difficulty bathing yourself, getting dressed or grooming yourself? No   Do you have difficulty using the toilet? No   Do you have difficulty moving around from place to place? No   Do you have trouble with steps or getting out of a bed or a chair? No   Current Diet Well Balanced Diet   Dental Exam Up to date   Eye Exam Up to date   Exercise (times per week) 7 times per week   Current Exercises Include Walking   Do you need help using the phone?  No   Are you deaf or do you have serious difficulty  hearing?  No   Do you need help with transportation? No   Do you need help shopping? No   Do you need help preparing meals?  No   Do you need help with housework?  No   Do you need help with laundry? No   Do you need help taking your medications? No   Do you need help managing money? No   Do you ever drive or ride in a car without wearing a seat belt? No   Have you felt unusual stress, anger or loneliness in the last month? No   Who do you live with? Spouse   If you need help, do you have trouble finding someone available to you? No   Have you been bothered in the last four weeks by sexual problems? No   Do you have difficulty concentrating, remembering or making decisions? No       Age-appropriate Screening Schedule:  Refer to the list below for future screening recommendations based on patient's age, sex and/or medical conditions. Orders for these recommended tests are listed in the plan section. The patient has been provided with a written plan.    Health Maintenance   Topic Date Due   • TDAP/TD VACCINES (1 - Tdap) Never done   • ZOSTER VACCINE (1 of 2) Never done   • INFLUENZA VACCINE  Never done   • LIPID PANEL  11/03/2023            Assessment & Plan   CMS Preventative Services Quick Reference  Risk Factors Identified During Encounter  Immunizations Discussed/Encouraged (specific Immunizations; Td, Tdap, Hepatitis A Vaccine/Series, Hepatitis B Vaccine/Series, Influenza, Pneumococcal 23, Prevnar 20 (Pneumococcal 20-valent conjugate), Vaxneuvance (Pneumococcal 15-valent conjugate), Shingrix and COVID19  Inactivity/Sedentary  Obesity/Overweight   The above risks/problems have been discussed with the patient.  Follow up actions/plans if indicated are seen below in the Assessment/Plan Section.  Pertinent information has been shared with the patient in the After Visit Summary.    Diagnoses and all orders for this visit:    1. Initial Medicare annual wellness visit (Primary)  Assessment & Plan:  Discussed recent lab  results.  Patient will leave urine sample at a later time.  Goal blood pressure less than 140/90.  Let me know if gets to or above that.  Proper diet and exercise plan discussed and encouraged.  He states he will do the hep C screening blood work at his next appointment.  States tetanus vaccine up-to-date.  I encouraged him to discuss all remaining immunizations such as pneumonia shingles COVID with his pharmacist.  Again, he understands the risks of not having these immunizations.  We will schedule colonoscopy.  Does not need help with ADLs.  No cognitive impairment.  No hearing impairment.  Risk of meds discussed and understood.  Denies refills at this time.  Education provided.  Return to clinic or ED with any issues or concerns.      2. Mixed hyperlipidemia  Assessment & Plan:  We discussed his recent lab results.  Cholesterol levels are high.  He needs to be on a cholesterol-lowering medication but he denies starting one at this time and he understands the risks of not being on a statin such as heart attack stroke or even death.  Stressed proper diet and exercise plan.  He states he and his wife have started walking more recently and they have both agreed to greatly improve their diet.  He states he will eat low-fat and overall healthier foods.  He states he wants to try to do this for 3 months and then will recheck blood work.  He states in 3 months if his cholesterol levels are still higher than we like he will then consider starting a statin.  He states in the past they have caused some muscle aches that which is why he is hesitant at this time.  Again, he understands the risks of not starting a statin today.  Recheck 3 months.  Return to clinic or ED with any issues or concerns.      3. Benign essential HTN    4. Seasonal allergic rhinitis, unspecified trigger    5. Gastroesophageal reflux disease, unspecified whether esophagitis present    6. Screening for colon cancer    7. Screening for prostate  cancer    8. Need for hepatitis C screening test    9. Degenerative disc disease, lumbar    10. Degenerative disc disease, cervical    11. Bilateral carpal tunnel syndrome    12. Advanced directives, counseling/discussion    13. Needs flu shot  Assessment & Plan:  Flu shot given today in clinic.  Vaccine information sheet given.  Risk discussed and understood.  Patient tolerated well.  Return to clinic or ED with any issues or concerns.        Follow Up:  Return in about 3 months (around 2/7/2023).     An After Visit Summary and PPPS were made available to the patient.    {Optional Chart Navigation Links Wrapup  Review (Popup)  Advance Care Planning  Labs  CC  Problem List  Visit Diagnosis  Medications  Result Review  Imaging  [facility]  BestPractice  SmartSets  SnapShot  Encounters  Notes  Media  Procedures :23}    I spent 40 minutes caring for Behzad on this date of service. This time includes time spent by me in the following activities:preparing for the visit, reviewing tests, obtaining and/or reviewing a separately obtained history, performing a medically appropriate examination and/or evaluation , counseling and educating the patient/family/caregiver, ordering medications, tests, or procedures, documenting information in the medical record, independently interpreting results and communicating that information with the patient/family/caregiver and care coordination

## 2022-11-07 NOTE — ASSESSMENT & PLAN NOTE
Discussed recent lab results.  Patient will leave urine sample at a later time.  Goal blood pressure less than 140/90.  Let me know if gets to or above that.  Proper diet and exercise plan discussed and encouraged.  He states he will do the hep C screening blood work at his next appointment.  States tetanus vaccine up-to-date.  I encouraged him to discuss all remaining immunizations such as pneumonia shingles COVID with his pharmacist.  Again, he understands the risks of not having these immunizations.  We will schedule colonoscopy.  Does not need help with ADLs.  No cognitive impairment.  No hearing impairment.  Risk of meds discussed and understood.  Denies refills at this time.  Education provided.  Return to clinic or ED with any issues or concerns.

## 2022-11-07 NOTE — ASSESSMENT & PLAN NOTE
We discussed his recent lab results.  Cholesterol levels are high.  He needs to be on a cholesterol-lowering medication but he denies starting one at this time and he understands the risks of not being on a statin such as heart attack stroke or even death.  Stressed proper diet and exercise plan.  He states he and his wife have started walking more recently and they have both agreed to greatly improve their diet.  He states he will eat low-fat and overall healthier foods.  He states he wants to try to do this for 3 months and then will recheck blood work.  He states in 3 months if his cholesterol levels are still higher than we like he will then consider starting a statin.  He states in the past they have caused some muscle aches that which is why he is hesitant at this time.  Again, he understands the risks of not starting a statin today.  Recheck 3 months.  Return to clinic or ED with any issues or concerns.

## 2022-11-07 NOTE — ASSESSMENT & PLAN NOTE
Flu shot given today in clinic.  Vaccine information sheet given.  Risk discussed and understood.  Patient tolerated well.  Return to clinic or ED with any issues or concerns.

## 2023-01-10 ENCOUNTER — TELEPHONE (OUTPATIENT)
Dept: FAMILY MEDICINE CLINIC | Facility: CLINIC | Age: 68
End: 2023-01-10
Payer: COMMERCIAL

## 2023-01-10 DIAGNOSIS — Z12.11 SCREENING FOR COLON CANCER: Primary | ICD-10-CM

## 2023-01-10 NOTE — TELEPHONE ENCOUNTER
Please let patient know that I apologize that I must have forgotten to put the order in for his colonoscopy.  I have now put it in.  Can someone please see if the referral team can get this going fairly quickly since I forgot to put it in last time.  Thanks

## 2023-01-11 NOTE — TELEPHONE ENCOUNTER
Please let patient know that I apologize that I must have forgotten to put the order in for his colonoscopy.  I have now put it in.  Can someone please see if the referral team can get this going fairly quickly since I forgot to put it in last time.  Thanks    Pt wants [facility] in [place]    Pt contacted

## 2023-02-06 ENCOUNTER — OFFICE VISIT (OUTPATIENT)
Dept: FAMILY MEDICINE CLINIC | Facility: CLINIC | Age: 68
End: 2023-02-06
Payer: MEDICARE

## 2023-02-06 VITALS
OXYGEN SATURATION: 98 % | WEIGHT: 240 LBS | HEIGHT: 73 IN | DIASTOLIC BLOOD PRESSURE: 80 MMHG | BODY MASS INDEX: 31.81 KG/M2 | HEART RATE: 65 BPM | SYSTOLIC BLOOD PRESSURE: 136 MMHG

## 2023-02-06 DIAGNOSIS — I10 BENIGN ESSENTIAL HTN: ICD-10-CM

## 2023-02-06 DIAGNOSIS — Z85.820 HISTORY OF MELANOMA: ICD-10-CM

## 2023-02-06 DIAGNOSIS — E78.2 MIXED HYPERLIPIDEMIA: Primary | ICD-10-CM

## 2023-02-06 DIAGNOSIS — M51.36 DEGENERATIVE DISC DISEASE, LUMBAR: ICD-10-CM

## 2023-02-06 PROCEDURE — 99214 OFFICE O/P EST MOD 30 MIN: CPT | Performed by: FAMILY MEDICINE

## 2023-02-06 PROCEDURE — 36415 COLL VENOUS BLD VENIPUNCTURE: CPT | Performed by: FAMILY MEDICINE

## 2023-02-06 RX ORDER — LISINOPRIL AND HYDROCHLOROTHIAZIDE 20; 12.5 MG/1; MG/1
1 TABLET ORAL DAILY
Qty: 90 TABLET | Refills: 1 | Status: SHIPPED | OUTPATIENT
Start: 2023-02-06

## 2023-02-06 RX ORDER — NABUMETONE 750 MG/1
750 TABLET, FILM COATED ORAL 2 TIMES DAILY
Qty: 180 TABLET | Refills: 1 | Status: SHIPPED | OUTPATIENT
Start: 2023-02-06

## 2023-02-06 NOTE — PROGRESS NOTES
Follow Up Office Visit      Date of Visit:  2023   Patient Name: Behzad Peoples  : 1955   MRN: [id number]     Chief Complaint:    Chief Complaint   Patient presents with   • Hyperlipidemia       History of Present Illness: Behzad Peoples is a 67 y.o. male who is here today for follow up.  Patient following up on lipids.  Has been watching his diet.  Would like a recheck.  May need to consider lipid medication.  Needs refills on hypertension and back medication.  Conditions are stable.  He also needs referral to a new dermatologist.  He has retired.        Subjective      Review of Systems:   Review of Systems   Constitutional: Negative for fatigue and fever.   HENT: Negative for congestion and ear pain.    Respiratory: Negative for apnea, cough, chest tightness and shortness of breath.    Cardiovascular: Negative for chest pain.   Gastrointestinal: Negative for abdominal pain, constipation, diarrhea and nausea.   Musculoskeletal: Negative for arthralgias.   Psychiatric/Behavioral: Negative for depressed mood and stress.       Past Medical History:   Past Medical History:   Diagnosis Date   • Arthritis    • Bronchitis    • GERD (gastroesophageal reflux disease)    • History of SCC (squamous cell carcinoma) of skin    • Hyperlipidemia    • Hypertension    • Melanoma (HCC)    • Melanoma (HCC)     scalp   • Wears glasses        Past Surgical History:   Past Surgical History:   Procedure Laterality Date   • COLONOSCOPY     • LUMBAR LAMINECTOMY DISCECTOMY DECOMPRESSION Bilateral 3/19/2021    Procedure: LUMBAR DECOMPRESSION L3, L4;  Surgeon: Saran Quiros MD;  Location: [facility];  Service: Neurosurgery;  Laterality: Bilateral;   • SKIN CANCER EXCISION         Family History: No family history on file.    Social History:   Social History     Socioeconomic History   • Marital status:    Tobacco Use   • Smoking status: Never   • Smokeless tobacco: Never   Vaping Use   • Vaping  "Use: Never used   Substance and Sexual Activity   • Alcohol use: Yes     Comment: 1 drink a week   • Drug use: No   • Sexual activity: Defer       Medications:     Current Outpatient Medications:   •  ascorbic acid (VITAMIN C) 1000 MG tablet, Take 1,000 mg by mouth 2 (two) times a day., Disp: , Rfl:   •  famotidine (PEPCID) 40 MG tablet, Take 1 tablet by mouth Every Night., Disp: 90 tablet, Rfl: 0  •  lisinopril-hydrochlorothiazide (PRINZIDE,ZESTORETIC) 20-12.5 MG per tablet, Take 1 tablet by mouth Daily., Disp: 90 tablet, Rfl: 1  •  nabumetone (RELAFEN) 750 MG tablet, Take 1 tablet by mouth 2 (Two) Times a Day., Disp: 180 tablet, Rfl: 1  •  multivitamin with minerals tablet tablet, Take 1 tablet by mouth Daily., Disp: , Rfl:     Allergies:   Allergies   Allergen Reactions   • Bee Venom Anaphylaxis     Takes shots to prevent   • Omeprazole Rash   • Protonix [Pantoprazole] Rash     Any of the omeprazole drugs   • Sulfa Antibiotics Rash     Pt can't remember reaction       Objective     Physical Exam:  Vital Signs:   Vitals:    02/06/23 0959   BP: 136/80   Pulse: 65   SpO2: 98%   Weight: 109 kg (240 lb)   Height: 185.4 cm (73\")     Body mass index is 31.66 kg/m².     Physical Exam  Vitals and nursing note reviewed.   Constitutional:       General: He is not in acute distress.     Appearance: Normal appearance. He is not ill-appearing.   HENT:      Head: Normocephalic and atraumatic.      Right Ear: Tympanic membrane and ear canal normal.      Left Ear: Tympanic membrane and ear canal normal.      Nose: Nose normal.   Cardiovascular:      Rate and Rhythm: Normal rate and regular rhythm.      Heart sounds: Normal heart sounds.   Pulmonary:      Effort: Pulmonary effort is normal.      Breath sounds: Normal breath sounds.   Neurological:      Mental Status: He is alert and oriented to person, place, and time. Mental status is at baseline.   Psychiatric:         Mood and Affect: Mood normal. "         Procedures      Assessment / Plan      Assessment/Plan:   Diagnoses and all orders for this visit:    1. Mixed hyperlipidemia (Primary)  -     Comprehensive metabolic panel; Future  -     Lipid Panel; Future    2. History of melanoma  -     Ambulatory Referral to Dermatology    3. Benign essential HTN    4. Degenerative disc disease, lumbar    Other orders  -     lisinopril-hydrochlorothiazide (PRINZIDE,ZESTORETIC) 20-12.5 MG per tablet; Take 1 tablet by mouth Daily.  Dispense: 90 tablet; Refill: 1  -     nabumetone (RELAFEN) 750 MG tablet; Take 1 tablet by mouth 2 (Two) Times a Day.  Dispense: 180 tablet; Refill: 1         Recheck lipids.  May need to start statin.  Will most likely is Pravachol.  Had problems with Lipitor in the past.  Continue current medications for hypertension and arthritis.  Made referral to dermatology for continued surveillance.  He is current dermatologist retired.    Follow Up:   No follow-ups on file.    Rigo Mustafa  [facility] [place]

## 2023-02-07 LAB
ALBUMIN SERPL-MCNC: 4.7 G/DL (ref 3.8–4.8)
ALBUMIN/GLOB SERPL: 2 {RATIO} (ref 1.2–2.2)
ALP SERPL-CCNC: 62 IU/L (ref 44–121)
ALT SERPL-CCNC: 23 IU/L (ref 0–44)
AST SERPL-CCNC: 23 IU/L (ref 0–40)
BILIRUB SERPL-MCNC: 0.4 MG/DL (ref 0–1.2)
BUN SERPL-MCNC: 10 MG/DL (ref 8–27)
BUN/CREAT SERPL: 10 (ref 10–24)
CALCIUM SERPL-MCNC: 9.8 MG/DL (ref 8.6–10.2)
CHLORIDE SERPL-SCNC: 101 MMOL/L (ref 96–106)
CHOLEST SERPL-MCNC: 275 MG/DL (ref 100–199)
CO2 SERPL-SCNC: 20 MMOL/L (ref 20–29)
CREAT SERPL-MCNC: 1.03 MG/DL (ref 0.76–1.27)
EGFRCR SERPLBLD CKD-EPI 2021: 80 ML/MIN/1.73
GLOBULIN SER CALC-MCNC: 2.4 G/DL (ref 1.5–4.5)
GLUCOSE SERPL-MCNC: 99 MG/DL (ref 70–99)
HDLC SERPL-MCNC: 40 MG/DL
LABORATORY COMMENT REPORT: ABNORMAL
LDLC SERPL CALC-MCNC: 199 MG/DL (ref 0–99)
POTASSIUM SERPL-SCNC: 4.6 MMOL/L (ref 3.5–5.2)
PROT SERPL-MCNC: 7.1 G/DL (ref 6–8.5)
SODIUM SERPL-SCNC: 138 MMOL/L (ref 134–144)
TRIGL SERPL-MCNC: 189 MG/DL (ref 0–149)
VLDLC SERPL CALC-MCNC: 36 MG/DL (ref 5–40)

## 2023-02-19 RX ORDER — PRAVASTATIN SODIUM 20 MG
20 TABLET ORAL DAILY
Qty: 90 TABLET | Refills: 1 | Status: SHIPPED | OUTPATIENT
Start: 2023-02-19

## 2023-05-18 RX ORDER — FAMOTIDINE 40 MG/1
40 TABLET, FILM COATED ORAL NIGHTLY
Qty: 90 TABLET | Refills: 0 | Status: SHIPPED | OUTPATIENT
Start: 2023-05-18

## 2023-05-23 ENCOUNTER — LAB (OUTPATIENT)
Dept: FAMILY MEDICINE CLINIC | Facility: CLINIC | Age: 68
End: 2023-05-23
Payer: MEDICARE

## 2023-05-23 DIAGNOSIS — E78.2 MIXED HYPERLIPIDEMIA: Primary | ICD-10-CM

## 2023-05-23 DIAGNOSIS — I10 BENIGN ESSENTIAL HTN: ICD-10-CM

## 2023-05-24 LAB
ALBUMIN SERPL-MCNC: 4.5 G/DL (ref 3.8–4.8)
ALBUMIN/GLOB SERPL: 1.8 {RATIO} (ref 1.2–2.2)
ALP SERPL-CCNC: 60 IU/L (ref 44–121)
ALT SERPL-CCNC: 29 IU/L (ref 0–44)
AST SERPL-CCNC: 21 IU/L (ref 0–40)
BASOPHILS # BLD AUTO: 0.1 X10E3/UL (ref 0–0.2)
BASOPHILS NFR BLD AUTO: 1 %
BILIRUB SERPL-MCNC: 0.5 MG/DL (ref 0–1.2)
BUN SERPL-MCNC: 11 MG/DL (ref 8–27)
BUN/CREAT SERPL: 11 (ref 10–24)
CALCIUM SERPL-MCNC: 9.6 MG/DL (ref 8.6–10.2)
CHLORIDE SERPL-SCNC: 103 MMOL/L (ref 96–106)
CHOLEST SERPL-MCNC: 224 MG/DL (ref 100–199)
CO2 SERPL-SCNC: 23 MMOL/L (ref 20–29)
CREAT SERPL-MCNC: 1.04 MG/DL (ref 0.76–1.27)
EGFRCR SERPLBLD CKD-EPI 2021: 79 ML/MIN/1.73
EOSINOPHIL # BLD AUTO: 0.2 X10E3/UL (ref 0–0.4)
EOSINOPHIL NFR BLD AUTO: 4 %
ERYTHROCYTE [DISTWIDTH] IN BLOOD BY AUTOMATED COUNT: 13.1 % (ref 11.6–15.4)
GLOBULIN SER CALC-MCNC: 2.5 G/DL (ref 1.5–4.5)
GLUCOSE SERPL-MCNC: 94 MG/DL (ref 70–99)
HCT VFR BLD AUTO: 44.7 % (ref 37.5–51)
HDLC SERPL-MCNC: 40 MG/DL
HGB BLD-MCNC: 15.5 G/DL (ref 13–17.7)
IMM GRANULOCYTES # BLD AUTO: 0 X10E3/UL (ref 0–0.1)
IMM GRANULOCYTES NFR BLD AUTO: 0 %
LDLC SERPL CALC-MCNC: 152 MG/DL (ref 0–99)
LYMPHOCYTES # BLD AUTO: 1.5 X10E3/UL (ref 0.7–3.1)
LYMPHOCYTES NFR BLD AUTO: 24 %
MCH RBC QN AUTO: 32.2 PG (ref 26.6–33)
MCHC RBC AUTO-ENTMCNC: 34.7 G/DL (ref 31.5–35.7)
MCV RBC AUTO: 93 FL (ref 79–97)
MONOCYTES # BLD AUTO: 0.7 X10E3/UL (ref 0.1–0.9)
MONOCYTES NFR BLD AUTO: 12 %
NEUTROPHILS # BLD AUTO: 3.7 X10E3/UL (ref 1.4–7)
NEUTROPHILS NFR BLD AUTO: 59 %
PLATELET # BLD AUTO: 320 X10E3/UL (ref 150–450)
POTASSIUM SERPL-SCNC: 4.4 MMOL/L (ref 3.5–5.2)
PROT SERPL-MCNC: 7 G/DL (ref 6–8.5)
RBC # BLD AUTO: 4.82 X10E6/UL (ref 4.14–5.8)
SODIUM SERPL-SCNC: 142 MMOL/L (ref 134–144)
TRIGL SERPL-MCNC: 177 MG/DL (ref 0–149)
TSH SERPL DL<=0.005 MIU/L-ACNC: 1.74 UIU/ML (ref 0.45–4.5)
VLDLC SERPL CALC-MCNC: 32 MG/DL (ref 5–40)
WBC # BLD AUTO: 6.2 X10E3/UL (ref 3.4–10.8)

## 2023-06-01 ENCOUNTER — OFFICE VISIT (OUTPATIENT)
Dept: FAMILY MEDICINE CLINIC | Facility: CLINIC | Age: 68
End: 2023-06-01

## 2023-06-01 VITALS
SYSTOLIC BLOOD PRESSURE: 120 MMHG | HEIGHT: 73 IN | OXYGEN SATURATION: 98 % | BODY MASS INDEX: 32.2 KG/M2 | DIASTOLIC BLOOD PRESSURE: 84 MMHG | HEART RATE: 57 BPM | WEIGHT: 243 LBS

## 2023-06-01 DIAGNOSIS — I10 BENIGN ESSENTIAL HTN: ICD-10-CM

## 2023-06-01 DIAGNOSIS — M51.36 DEGENERATIVE DISC DISEASE, LUMBAR: ICD-10-CM

## 2023-06-01 DIAGNOSIS — E78.2 MIXED HYPERLIPIDEMIA: Primary | ICD-10-CM

## 2023-06-01 DIAGNOSIS — K21.9 GASTROESOPHAGEAL REFLUX DISEASE, UNSPECIFIED WHETHER ESOPHAGITIS PRESENT: ICD-10-CM

## 2023-06-01 PROCEDURE — 3079F DIAST BP 80-89 MM HG: CPT | Performed by: FAMILY MEDICINE

## 2023-06-01 PROCEDURE — 3074F SYST BP LT 130 MM HG: CPT | Performed by: FAMILY MEDICINE

## 2023-06-01 PROCEDURE — 99214 OFFICE O/P EST MOD 30 MIN: CPT | Performed by: FAMILY MEDICINE

## 2023-06-01 RX ORDER — CELECOXIB 200 MG/1
200 CAPSULE ORAL DAILY
Qty: 30 CAPSULE | Refills: 2 | Status: SHIPPED | OUTPATIENT
Start: 2023-06-01

## 2023-06-01 RX ORDER — FAMOTIDINE 40 MG/1
40 TABLET, FILM COATED ORAL NIGHTLY
Qty: 90 TABLET | Refills: 3 | Status: SHIPPED | OUTPATIENT
Start: 2023-06-01

## 2023-06-01 RX ORDER — ROSUVASTATIN CALCIUM 10 MG/1
10 TABLET, COATED ORAL DAILY
Qty: 30 TABLET | Refills: 1 | Status: SHIPPED | OUTPATIENT
Start: 2023-06-01

## 2023-06-01 RX ORDER — LISINOPRIL AND HYDROCHLOROTHIAZIDE 20; 12.5 MG/1; MG/1
1 TABLET ORAL DAILY
Qty: 90 TABLET | Refills: 1 | Status: SHIPPED | OUTPATIENT
Start: 2023-06-01

## 2023-06-01 NOTE — PROGRESS NOTES
Follow Up Office Visit      Date of Visit:  2023   Patient Name: Behzad Peoples  : 1955   MRN: [id number]     Chief Complaint:    Chief Complaint   Patient presents with   • Hyperlipidemia       History of Present Illness: Behzad Peoples is a 67 y.o. male who is here today for follow up.  Patient following up on hyperlipidemia.  There was improvement on his lipid medication but he did feel that he had muscle cramps and fatigue with the current statin.  Patient also would like to do a trial of a different anti-inflammatory medication for his arthralgias.  Not completely relieved with the nabumetone.        Subjective      Review of Systems:   Review of Systems   Constitutional: Negative for fatigue and fever.   HENT: Negative for congestion and ear pain.    Respiratory: Negative for apnea, cough, chest tightness and shortness of breath.    Cardiovascular: Negative for chest pain.   Gastrointestinal: Negative for abdominal pain, constipation, diarrhea and nausea.   Musculoskeletal: Negative for arthralgias.   Psychiatric/Behavioral: Negative for depressed mood and stress.       Past Medical History:   Past Medical History:   Diagnosis Date   • Arthritis    • Bronchitis    • GERD (gastroesophageal reflux disease)    • History of SCC (squamous cell carcinoma) of skin    • Hyperlipidemia    • Hypertension    • Melanoma    • Melanoma     scalp   • Wears glasses        Past Surgical History:   Past Surgical History:   Procedure Laterality Date   • COLONOSCOPY     • LUMBAR LAMINECTOMY DISCECTOMY DECOMPRESSION Bilateral 3/19/2021    Procedure: LUMBAR DECOMPRESSION L3, L4;  Surgeon: Saran Quiros MD;  Location: [facility];  Service: Neurosurgery;  Laterality: Bilateral;   • SKIN CANCER EXCISION         Family History: No family history on file.    Social History:   Social History     Socioeconomic History   • Marital status:    Tobacco Use   • Smoking status: Never   • Smokeless  "tobacco: Never   Vaping Use   • Vaping Use: Never used   Substance and Sexual Activity   • Alcohol use: Yes     Comment: 1 drink a week   • Drug use: No   • Sexual activity: Defer       Medications:     Current Outpatient Medications:   •  ascorbic acid (VITAMIN C) 1000 MG tablet, Take 1 tablet by mouth 2 (two) times a day., Disp: , Rfl:   •  famotidine (PEPCID) 40 MG tablet, Take 1 tablet by mouth Every Night., Disp: 90 tablet, Rfl: 3  •  lisinopril-hydrochlorothiazide (PRINZIDE,ZESTORETIC) 20-12.5 MG per tablet, Take 1 tablet by mouth Daily., Disp: 90 tablet, Rfl: 1  •  multivitamin with minerals tablet tablet, Take 1 tablet by mouth Daily., Disp: , Rfl:   •  celecoxib (CeleBREX) 200 MG capsule, Take 1 capsule by mouth Daily., Disp: 30 capsule, Rfl: 2  •  rosuvastatin (CRESTOR) 10 MG tablet, Take 1 tablet by mouth Daily., Disp: 30 tablet, Rfl: 1    Allergies:   Allergies   Allergen Reactions   • Bee Venom Anaphylaxis     Takes shots to prevent   • Omeprazole Rash   • Protonix [Pantoprazole] Rash     Any of the omeprazole drugs   • Sulfa Antibiotics Rash     Pt can't remember reaction       Objective     Physical Exam:  Vital Signs:   Vitals:    06/01/23 1025   BP: 120/84   Pulse: 57   SpO2: 98%   Weight: 110 kg (243 lb)   Height: 185.4 cm (73\")     Body mass index is 32.06 kg/m².     Physical Exam  Vitals and nursing note reviewed.   Constitutional:       General: He is not in acute distress.     Appearance: Normal appearance. He is not ill-appearing.   HENT:      Head: Normocephalic and atraumatic.      Right Ear: Tympanic membrane and ear canal normal.      Left Ear: Tympanic membrane and ear canal normal.      Nose: Nose normal.   Cardiovascular:      Rate and Rhythm: Normal rate and regular rhythm.      Heart sounds: Normal heart sounds.   Pulmonary:      Effort: Pulmonary effort is normal.      Breath sounds: Normal breath sounds.   Neurological:      Mental Status: He is alert and oriented to person, place, " and time. Mental status is at baseline.   Psychiatric:         Mood and Affect: Mood normal.         Procedures      Assessment / Plan      Assessment/Plan:   Diagnoses and all orders for this visit:    1. Mixed hyperlipidemia (Primary)  -     rosuvastatin (CRESTOR) 10 MG tablet; Take 1 tablet by mouth Daily.  Dispense: 30 tablet; Refill: 1    2. Degenerative disc disease, lumbar  -     celecoxib (CeleBREX) 200 MG capsule; Take 1 capsule by mouth Daily.  Dispense: 30 capsule; Refill: 2    3. Gastroesophageal reflux disease, unspecified whether esophagitis present  -     famotidine (PEPCID) 40 MG tablet; Take 1 tablet by mouth Every Night.  Dispense: 90 tablet; Refill: 3    4. Benign essential HTN  -     lisinopril-hydrochlorothiazide (PRINZIDE,ZESTORETIC) 20-12.5 MG per tablet; Take 1 tablet by mouth Daily.  Dispense: 90 tablet; Refill: 1         Trial of Crestor for the lipids.  We will recheck lipids in about 3 months.  Trial of Celebrex for the osteoarthritis.    Follow Up:   No follow-ups on file.    Rigo Mustafa  [facility] [place]

## 2023-08-21 ENCOUNTER — OFFICE VISIT (OUTPATIENT)
Dept: FAMILY MEDICINE CLINIC | Facility: CLINIC | Age: 68
End: 2023-08-21
Payer: MEDICARE

## 2023-08-21 VITALS
BODY MASS INDEX: 32.74 KG/M2 | WEIGHT: 247.06 LBS | SYSTOLIC BLOOD PRESSURE: 122 MMHG | HEIGHT: 73 IN | HEART RATE: 78 BPM | OXYGEN SATURATION: 97 % | DIASTOLIC BLOOD PRESSURE: 70 MMHG

## 2023-08-21 DIAGNOSIS — M54.50 ACUTE LEFT-SIDED LOW BACK PAIN WITHOUT SCIATICA: Primary | ICD-10-CM

## 2023-08-21 PROCEDURE — 1160F RVW MEDS BY RX/DR IN RCRD: CPT | Performed by: NURSE PRACTITIONER

## 2023-08-21 PROCEDURE — 3078F DIAST BP <80 MM HG: CPT | Performed by: NURSE PRACTITIONER

## 2023-08-21 PROCEDURE — 1159F MED LIST DOCD IN RCRD: CPT | Performed by: NURSE PRACTITIONER

## 2023-08-21 PROCEDURE — 3074F SYST BP LT 130 MM HG: CPT | Performed by: NURSE PRACTITIONER

## 2023-08-21 PROCEDURE — 99213 OFFICE O/P EST LOW 20 MIN: CPT | Performed by: NURSE PRACTITIONER

## 2023-08-21 RX ORDER — CYCLOBENZAPRINE HCL 10 MG
10 TABLET ORAL 3 TIMES DAILY PRN
Qty: 16 TABLET | Refills: 0 | Status: SHIPPED | OUTPATIENT
Start: 2023-08-21

## 2023-08-21 RX ORDER — PREDNISONE 20 MG/1
TABLET ORAL
Qty: 18 TABLET | Refills: 0 | Status: SHIPPED | OUTPATIENT
Start: 2023-08-21 | End: 2023-08-30

## 2023-08-21 NOTE — PROGRESS NOTES
"Chief Complaint  Back Pain    Subjective          Behzad Peoples presents to [facility]  History of Present Illness    Patient states for the past 3 weeks he has had left-sided lower back pain.  No injury.  States he does have a history of laminectomy about 2 years ago in his lower back.  States pain does not radiate.  No leg pain.  States standing and walking makes it worse.  If he sits down that helps.  Has been using ice Tylenol and on Celebrex and states not helping much.  No urinary or bowel issues.    Objective   Vital Signs:   /70   Pulse 78   Ht 185.4 cm (73\")   Wt 112 kg (247 lb 1 oz)   SpO2 97%   BMI 32.60 kg/mý     Body mass index is 32.6 kg/mý.    Review of Systems   Constitutional:  Negative for chills and fever.   Respiratory:  Negative for cough, shortness of breath and wheezing.    Cardiovascular:  Negative for chest pain and palpitations.   Gastrointestinal:  Negative for abdominal pain, diarrhea, nausea and vomiting.   Genitourinary:  Negative for decreased urine volume, dysuria, frequency, hematuria and urgency.   Musculoskeletal:  Positive for back pain. Negative for joint swelling and myalgias.   Skin:  Negative for rash.   Neurological:  Negative for weakness, numbness and headache.     Past History:  Medical History: has a past medical history of Arthritis, Bronchitis, GERD (gastroesophageal reflux disease), History of SCC (squamous cell carcinoma) of skin, Hyperlipidemia, Hypertension, Melanoma, Melanoma, and Wears glasses.   Surgical History: has a past surgical history that includes Skin cancer excision; Colonoscopy (2014); and lumbar laminectomy discectomy decompression (Bilateral, 3/19/2021).   Family History: family history is not on file.   Social History: reports that he has never smoked. He has never used smokeless tobacco. He reports current alcohol use. He reports that he does not use drugs.    PHQ-2 Depression Screening  Little " interest or pleasure in doing things?     Feeling down, depressed, or hopeless?     PHQ-2 Total Score          PHQ-9 Depression Screening  Little interest or pleasure in doing things?     Feeling down, depressed, or hopeless?     Trouble falling or staying asleep, or sleeping too much?     Feeling tired or having little energy?     Poor appetite or overeating?     Feeling bad about yourself - or that you are a failure or have let yourself or your family down?     Trouble concentrating on things, such as reading the newspaper or watching television?     Moving or speaking so slowly that other people could have noticed? Or the opposite - being so fidgety or restless that you have been moving around a lot more than usual?     Thoughts that you would be better off dead, or of hurting yourself in some way?     PHQ-9 Total Score     If you checked off any problems, how difficult have these problems made it for you to do your work, take care of things at home, or get along with other people?       PHQ-9 Total Score:        Patient screened positive for depression based on a PHQ-9 score of 0 on 6/1/2023. Follow-up recommendations include:          Current Outpatient Medications:     ascorbic acid (VITAMIN C) 1000 MG tablet, Take 1 tablet by mouth 2 (two) times a day., Disp: , Rfl:     celecoxib (CeleBREX) 200 MG capsule, Take 1 capsule by mouth Daily., Disp: 30 capsule, Rfl: 2    famotidine (PEPCID) 40 MG tablet, Take 1 tablet by mouth Every Night., Disp: 90 tablet, Rfl: 3    lisinopril-hydrochlorothiazide (PRINZIDE,ZESTORETIC) 20-12.5 MG per tablet, Take 1 tablet by mouth Daily., Disp: 90 tablet, Rfl: 1    multivitamin with minerals tablet tablet, Take 1 tablet by mouth Daily., Disp: , Rfl:     rosuvastatin (CRESTOR) 10 MG tablet, Take 1 tablet by mouth Daily., Disp: 30 tablet, Rfl: 1    cyclobenzaprine (FLEXERIL) 10 MG tablet, Take 1 tablet by mouth 3 (Three) Times a Day As Needed for Muscle Spasms., Disp: 16 tablet, Rfl:  0    predniSONE (DELTASONE) 20 MG tablet, Take 3 tablets by mouth Daily for 3 days, THEN 2 tablets Daily for 3 days, THEN 1 tablet Daily for 3 days., Disp: 18 tablet, Rfl: 0   (Not in a hospital admission)     Allergies: Bee venom, Omeprazole, Protonix [pantoprazole], and Sulfa antibiotics    Physical Exam  Constitutional:       Appearance: Normal appearance.   Eyes:      Conjunctiva/sclera: Conjunctivae normal.      Pupils: Pupils are equal, round, and reactive to light.   Cardiovascular:      Rate and Rhythm: Normal rate and regular rhythm.      Heart sounds: Normal heart sounds.   Pulmonary:      Effort: Pulmonary effort is normal.      Breath sounds: Normal breath sounds.   Musculoskeletal:      Lumbar back: Tenderness present. No swelling, edema, deformity, lacerations or bony tenderness. Normal range of motion. Negative right straight leg raise test and negative left straight leg raise test.        Back:       Comments: Sore to palpation in area marked above. No redness, no warmth, no rash.    Skin:     General: Skin is warm.      Findings: No rash.   Neurological:      General: No focal deficit present.      Mental Status: He is alert and oriented to person, place, and time. Mental status is at baseline.   Psychiatric:         Mood and Affect: Mood normal.         Behavior: Behavior normal.         Thought Content: Thought content normal.         Judgment: Judgment normal.        Result Review :                   Assessment and Plan    Diagnoses and all orders for this visit:    1. Acute left-sided low back pain without sciatica (Primary)  Assessment & Plan:  X-ray completed.  Will let know if radiologist sees anything.  Rest and ice in 15-minute intervals encouraged.  Limit picking up heavy objects and proper body mechanics discussed and encouraged.  We will try a round of prednisone.  Avoid all NSAIDs including Celebrex while on prednisone.  Cyclobenzaprine as needed for spasms.  Risk of meds discussed and  understood.  Education provided.  Return in 1 week if no improvement, sooner if worsens.  Return to clinic or ED with any issues or concerns.    Orders:  -     XR Spine Lumbar 2 or 3 View (In Office)  -     cyclobenzaprine (FLEXERIL) 10 MG tablet; Take 1 tablet by mouth 3 (Three) Times a Day As Needed for Muscle Spasms.  Dispense: 16 tablet; Refill: 0  -     predniSONE (DELTASONE) 20 MG tablet; Take 3 tablets by mouth Daily for 3 days, THEN 2 tablets Daily for 3 days, THEN 1 tablet Daily for 3 days.  Dispense: 18 tablet; Refill: 0              BMI is >= 30 and <35. (Class 1 Obesity). The following options were offered after discussion;: exercise counseling/recommendations and nutrition counseling/recommendations       Follow Up   Return if symptoms worsen or fail to improve.  Patient was given instructions and counseling regarding his condition or for health maintenance advice. Please see specific information pulled into the AVS if appropriate.     GEORGE Cook

## 2023-08-21 NOTE — ASSESSMENT & PLAN NOTE
X-ray completed.  Will let know if radiologist sees anything.  Rest and ice in 15-minute intervals encouraged.  Limit picking up heavy objects and proper body mechanics discussed and encouraged.  We will try a round of prednisone.  Avoid all NSAIDs including Celebrex while on prednisone.  Cyclobenzaprine as needed for spasms.  Risk of meds discussed and understood.  Education provided.  Return in 1 week if no improvement, sooner if worsens.  Return to clinic or ED with any issues or concerns.

## 2023-08-24 DIAGNOSIS — M54.50 LOW BACK PAIN, UNSPECIFIED BACK PAIN LATERALITY, UNSPECIFIED CHRONICITY, UNSPECIFIED WHETHER SCIATICA PRESENT: Primary | ICD-10-CM

## 2023-08-29 ENCOUNTER — TELEPHONE (OUTPATIENT)
Dept: FAMILY MEDICINE CLINIC | Facility: CLINIC | Age: 68
End: 2023-08-29
Payer: COMMERCIAL

## 2023-08-29 DIAGNOSIS — M51.36 DEGENERATIVE DISC DISEASE, LUMBAR: ICD-10-CM

## 2023-08-29 DIAGNOSIS — I10 BENIGN ESSENTIAL HTN: ICD-10-CM

## 2023-08-29 DIAGNOSIS — E78.2 MIXED HYPERLIPIDEMIA: ICD-10-CM

## 2023-08-29 DIAGNOSIS — K21.9 GASTROESOPHAGEAL REFLUX DISEASE, UNSPECIFIED WHETHER ESOPHAGITIS PRESENT: ICD-10-CM

## 2023-08-29 NOTE — TELEPHONE ENCOUNTER
He can continue his celebrex, can add tylenol arthritis. Will see if we can get him in quicker for his MRI. Would he be interested in trying physical therapy? Would recommend. If so let me know and I will put the order in. Thanks    Can someone please check with the referral team about his MRI and see if they can get it scheduled quickly. Thanks

## 2023-08-29 NOTE — TELEPHONE ENCOUNTER
Pt says he has finished course of pred, still having back pain. Pred did not help much. Is still waiting to hear about MRI. Wants to know if there is anything else Derek can recommend, or if he needs to come in for a follow up with Derek while waiting on MRI. Please advise.

## 2023-08-30 RX ORDER — FAMOTIDINE 40 MG/1
40 TABLET, FILM COATED ORAL NIGHTLY
Qty: 90 TABLET | Refills: 1 | Status: SHIPPED | OUTPATIENT
Start: 2023-08-30

## 2023-08-30 RX ORDER — LISINOPRIL AND HYDROCHLOROTHIAZIDE 20; 12.5 MG/1; MG/1
1 TABLET ORAL DAILY
Qty: 90 TABLET | Refills: 1 | Status: SHIPPED | OUTPATIENT
Start: 2023-08-30

## 2023-08-30 RX ORDER — ROSUVASTATIN CALCIUM 10 MG/1
10 TABLET, COATED ORAL DAILY
Qty: 90 TABLET | Refills: 1 | Status: SHIPPED | OUTPATIENT
Start: 2023-08-30

## 2023-08-30 RX ORDER — CELECOXIB 200 MG/1
200 CAPSULE ORAL DAILY
Qty: 90 CAPSULE | Refills: 1 | Status: SHIPPED | OUTPATIENT
Start: 2023-08-30

## 2023-09-29 ENCOUNTER — TELEPHONE (OUTPATIENT)
Dept: FAMILY MEDICINE CLINIC | Facility: CLINIC | Age: 68
End: 2023-09-29

## 2023-09-29 NOTE — TELEPHONE ENCOUNTER
Pt is experiencing left hip pain and lower back pain. Wants to know if there is a way to get a scan on both the hip and back at the same time or same day. MRI for back is scheduled for 10/6/2023. Requesting call or message on [employer]. Please advise.

## 2023-10-02 NOTE — TELEPHONE ENCOUNTER
Most of the time they will want to did actually do both images at the same time.  They especially want if we have not been any previous images to x-ray of the hips.  He could come in and get an x-ray on Tuesday if he wants and we can go from there.  If it showed anything we might be able to deal with that and that might make it easier to get an MRI.  Let me know if he wants me to put in an order.

## 2023-10-06 ENCOUNTER — HOSPITAL ENCOUNTER (OUTPATIENT)
Dept: MRI IMAGING | Facility: HOSPITAL | Age: 68
Discharge: HOME | End: 2023-10-06
Admitting: NURSE PRACTITIONER
Payer: MEDICARE

## 2023-10-06 DIAGNOSIS — M54.50 LOW BACK PAIN, UNSPECIFIED BACK PAIN LATERALITY, UNSPECIFIED CHRONICITY, UNSPECIFIED WHETHER SCIATICA PRESENT: ICD-10-CM

## 2023-10-06 PROCEDURE — 72148 MRI LUMBAR SPINE W/O DYE: CPT

## 2023-10-13 ENCOUNTER — TELEPHONE (OUTPATIENT)
Dept: FAMILY MEDICINE CLINIC | Facility: CLINIC | Age: 68
End: 2023-10-13
Payer: COMMERCIAL

## 2023-10-13 NOTE — TELEPHONE ENCOUNTER
PATIENT STOPPED IN OFFICE, HE SAID HE HAD HIS MRI DONE 10/6 AND HAS SEEN THE RESULTS IN [facility], BUT HE WOULD LIKE TO KNOW WHAT IS NEXT. REQUESTING A CALL BACK.

## 2023-10-16 DIAGNOSIS — M54.50 CHRONIC LOW BACK PAIN, UNSPECIFIED BACK PAIN LATERALITY, UNSPECIFIED WHETHER SCIATICA PRESENT: Primary | ICD-10-CM

## 2023-10-16 DIAGNOSIS — G89.29 CHRONIC LOW BACK PAIN, UNSPECIFIED BACK PAIN LATERALITY, UNSPECIFIED WHETHER SCIATICA PRESENT: Primary | ICD-10-CM

## 2023-10-18 NOTE — TELEPHONE ENCOUNTER
There is a message regarding this attached to his MRI report. Please make sure pt. Is aware. Thanks

## 2023-11-03 ENCOUNTER — OFFICE VISIT (OUTPATIENT)
Dept: NEUROSURGERY | Facility: CLINIC | Age: 68
End: 2023-11-03
Payer: MEDICARE

## 2023-11-03 VITALS — WEIGHT: 246.6 LBS | BODY MASS INDEX: 32.68 KG/M2 | TEMPERATURE: 97.5 F | HEIGHT: 73 IN

## 2023-11-03 DIAGNOSIS — M43.16 SPONDYLOLISTHESIS OF LUMBAR REGION: ICD-10-CM

## 2023-11-03 DIAGNOSIS — M48.062 SPINAL STENOSIS OF LUMBAR REGION WITH NEUROGENIC CLAUDICATION: Primary | ICD-10-CM

## 2023-11-03 DIAGNOSIS — M51.36 DDD (DEGENERATIVE DISC DISEASE), LUMBAR: ICD-10-CM

## 2023-11-28 ENCOUNTER — PATIENT MESSAGE (OUTPATIENT)
Dept: FAMILY MEDICINE CLINIC | Facility: CLINIC | Age: 68
End: 2023-11-28
Payer: COMMERCIAL

## 2023-11-28 NOTE — TELEPHONE ENCOUNTER
Called son and spoke to him regarding his father. I have sent a message in the patient's chart to dr. Hubbard.

## 2023-12-02 DIAGNOSIS — E78.2 MIXED HYPERLIPIDEMIA: ICD-10-CM

## 2023-12-02 DIAGNOSIS — K21.9 GASTROESOPHAGEAL REFLUX DISEASE, UNSPECIFIED WHETHER ESOPHAGITIS PRESENT: ICD-10-CM

## 2023-12-02 DIAGNOSIS — M51.36 DEGENERATIVE DISC DISEASE, LUMBAR: ICD-10-CM

## 2023-12-02 DIAGNOSIS — I10 BENIGN ESSENTIAL HTN: ICD-10-CM

## 2023-12-04 RX ORDER — LISINOPRIL AND HYDROCHLOROTHIAZIDE 20; 12.5 MG/1; MG/1
1 TABLET ORAL DAILY
Qty: 90 TABLET | Refills: 0 | Status: SHIPPED | OUTPATIENT
Start: 2023-12-04

## 2023-12-04 RX ORDER — FAMOTIDINE 40 MG/1
40 TABLET, FILM COATED ORAL NIGHTLY
Qty: 90 TABLET | Refills: 0 | Status: SHIPPED | OUTPATIENT
Start: 2023-12-04

## 2023-12-04 RX ORDER — ROSUVASTATIN CALCIUM 10 MG/1
10 TABLET, COATED ORAL DAILY
Qty: 90 TABLET | Refills: 0 | Status: SHIPPED | OUTPATIENT
Start: 2023-12-04

## 2023-12-04 RX ORDER — CELECOXIB 200 MG/1
200 CAPSULE ORAL DAILY
Qty: 90 CAPSULE | Refills: 0 | Status: SHIPPED | OUTPATIENT
Start: 2023-12-04

## 2023-12-06 ENCOUNTER — HOSPITAL ENCOUNTER (OUTPATIENT)
Dept: GENERAL RADIOLOGY | Facility: HOSPITAL | Age: 68
Discharge: HOME | End: 2023-12-06
Admitting: NEUROLOGICAL SURGERY
Payer: MEDICARE

## 2023-12-06 ENCOUNTER — OFFICE VISIT (OUTPATIENT)
Dept: NEUROSURGERY | Facility: CLINIC | Age: 68
End: 2023-12-06
Payer: MEDICARE

## 2023-12-06 VITALS — WEIGHT: 240.2 LBS | BODY MASS INDEX: 31.83 KG/M2 | HEIGHT: 73 IN | TEMPERATURE: 97.1 F

## 2023-12-06 DIAGNOSIS — M43.16 SPONDYLOLISTHESIS OF LUMBAR REGION: ICD-10-CM

## 2023-12-06 DIAGNOSIS — M48.062 SPINAL STENOSIS OF LUMBAR REGION WITH NEUROGENIC CLAUDICATION: Primary | ICD-10-CM

## 2023-12-06 DIAGNOSIS — M51.36 DDD (DEGENERATIVE DISC DISEASE), LUMBAR: ICD-10-CM

## 2023-12-06 PROCEDURE — 99213 OFFICE O/P EST LOW 20 MIN: CPT | Performed by: NEUROLOGICAL SURGERY

## 2023-12-06 PROCEDURE — 1160F RVW MEDS BY RX/DR IN RCRD: CPT | Performed by: NEUROLOGICAL SURGERY

## 2023-12-06 PROCEDURE — 1159F MED LIST DOCD IN RCRD: CPT | Performed by: NEUROLOGICAL SURGERY

## 2023-12-06 PROCEDURE — 72120 X-RAY BEND ONLY L-S SPINE: CPT

## 2023-12-06 NOTE — PROGRESS NOTES
Patient: Behzad Peoples  : 1955    Primary Care Provider: Rigo Mustafa MD    Requesting Provider: As above        History    Chief Complaint: Low back pain.    History of Present Illness: Mr. Peoples is a 68-year-old retired gentleman who is known to our service.  On 3/19/2021 he underwent decompressive laminectomies at L3-4 and L4-5 by my former colleague Dr. Quiros.  His chronic back pain that has been there since the 80s improved a little bit but it has been ongoing.  Over the last 6 months he has had increasing pain in his left lower back.  This occurs with standing or walking for long periods of time or with twisting.  He does better sitting.  He has no leg pain.  Since I have seen him he is having discomfort into the left inguinal region.  He still has no leg symptoms.  Physical therapy has not been helpful.    Review of Systems   Constitutional:  Negative for activity change, appetite change, chills, diaphoresis, fatigue, fever and unexpected weight change.   HENT:  Negative for congestion, dental problem, drooling, ear discharge, ear pain, facial swelling, hearing loss, mouth sores, nosebleeds, postnasal drip, rhinorrhea, sinus pressure, sinus pain, sneezing, sore throat, tinnitus, trouble swallowing and voice change.    Eyes:  Negative for photophobia, pain, discharge, redness, itching and visual disturbance.   Respiratory:  Negative for apnea, cough, choking, chest tightness, shortness of breath, wheezing and stridor.    Cardiovascular:  Negative for chest pain, palpitations and leg swelling.   Gastrointestinal:  Negative for abdominal distention, abdominal pain, anal bleeding, blood in stool, constipation, diarrhea, nausea, rectal pain and vomiting.   Endocrine: Negative for cold intolerance, heat intolerance, polydipsia, polyphagia and polyuria.   Genitourinary:  Negative for decreased urine volume, difficulty urinating, dysuria, enuresis, flank pain, frequency, genital sores,  "hematuria, penile discharge, penile pain, penile swelling, scrotal swelling, testicular pain and urgency.   Musculoskeletal:  Positive for back pain. Negative for arthralgias, gait problem, joint swelling, myalgias, neck pain and neck stiffness.   Skin:  Negative for color change, pallor, rash and wound.   Allergic/Immunologic: Positive for environmental allergies. Negative for food allergies and immunocompromised state.   Neurological:  Positive for numbness. Negative for dizziness, tremors, seizures, syncope, facial asymmetry, speech difficulty, weakness, light-headedness and headaches.   Hematological:  Negative for adenopathy. Does not bruise/bleed easily.   Psychiatric/Behavioral:  Negative for agitation, behavioral problems, confusion, decreased concentration, dysphoric mood, hallucinations, self-injury, sleep disturbance and suicidal ideas. The patient is not nervous/anxious and is not hyperactive.        The patient's past medical history, past surgical history, family history, and social history have been reviewed at length in the electronic medical record.      Physical Exam:   Temp 97.1 °F (36.2 °C) (Infrared)   Ht 185.4 cm (73\")   Wt 109 kg (240 lb 3.2 oz)   BMI 31.69 kg/m²   Derek's sign is negative on the left.  Straight leg raising is negative on the left as well.    Medical Decision Making    Data Review:   (All imaging studies were personally reviewed unless stated otherwise)  MRI of the lumbar spine dated 10/6/2023 is compared to study from 12/7/2020.  Laminectomies have been performed in between the studies. A very low-grade listhesis at L3-4 with small joint effusions is noted.  On the new study there is a low-grade listhesis that was not there previously at L4-5.  There is mild to at best moderate narrowing at L2-3.     Plain flexion-extension films dated 12/6/2023 demonstrate a prominent grade 1 listhesis of L4 and L5 that is a little worse than flexion.  There is a low-grade listhesis of " L3 on L4 that appears to be stable.    Diagnosis:   1.  Mechanical low back pain.  2.  Lumbar spondylolisthesis with mild instability.    Treatment Options:   I am going to refer the patient to pain management for some blocks.  He will follow-up with neurosurgery on an as-needed basis.  The patient does not seem to think that his symptoms are severe enough to warrant surgical intervention at this time.      Scribed for Darrel Workman MD by Darrel Workman MD on 12/6/2023 15:46 EST      I, Dr. Workman, personally performed the services described in the documentation, as scribed in my presence, and it is both accurate and complete.

## 2023-12-11 ENCOUNTER — PATIENT MESSAGE (OUTPATIENT)
Dept: FAMILY MEDICINE CLINIC | Facility: CLINIC | Age: 68
End: 2023-12-11
Payer: COMMERCIAL

## 2023-12-21 ENCOUNTER — OFFICE VISIT (OUTPATIENT)
Dept: FAMILY MEDICINE CLINIC | Facility: CLINIC | Age: 68
End: 2023-12-21
Payer: MEDICARE

## 2023-12-21 VITALS
OXYGEN SATURATION: 97 % | BODY MASS INDEX: 32.2 KG/M2 | WEIGHT: 243 LBS | SYSTOLIC BLOOD PRESSURE: 134 MMHG | DIASTOLIC BLOOD PRESSURE: 82 MMHG | HEIGHT: 73 IN | HEART RATE: 60 BPM

## 2023-12-21 DIAGNOSIS — M10.9 ACUTE GOUT INVOLVING TOE OF RIGHT FOOT, UNSPECIFIED CAUSE: Primary | ICD-10-CM

## 2023-12-21 PROCEDURE — 3075F SYST BP GE 130 - 139MM HG: CPT | Performed by: PHYSICIAN ASSISTANT

## 2023-12-21 PROCEDURE — 3079F DIAST BP 80-89 MM HG: CPT | Performed by: PHYSICIAN ASSISTANT

## 2023-12-21 PROCEDURE — 99213 OFFICE O/P EST LOW 20 MIN: CPT | Performed by: PHYSICIAN ASSISTANT

## 2023-12-21 RX ORDER — PROBENECID AND COLCHICINE .5; 5 MG/1; MG/1
1 TABLET ORAL DAILY
Status: CANCELLED | OUTPATIENT
Start: 2023-12-21

## 2023-12-21 RX ORDER — PREDNISONE 20 MG/1
TABLET ORAL
Qty: 9 TABLET | Refills: 0 | Status: SHIPPED | OUTPATIENT
Start: 2023-12-21

## 2023-12-21 NOTE — PROGRESS NOTES
"Chief Complaint  Toe Pain (Pt is having rt toe pain with redness and inflammation, symptoms started on Mon or Tues, noticed after eating red meat. )    Subjective          History of Present Illness  Behzad Peoples is here today with toe pain    Patient states four days ago he had a large amount of pot roast.He states the next day he woke to his right big toe irritated painful red and swollen. He is concerned that he may have gout. He states that he has not had an issue with that before but has had arthritis in the past. He has started ibuprofen and celebrex neither seemed to be helping. He states that it seems to bother hie a lot when he is laying down. He feels that today it is looking a little better. He denies any fall or injury to that foot or toe         Objective   Vital Signs:   /82   Pulse 60   Ht 185.4 cm (73\")   Wt 110 kg (243 lb)   SpO2 97%   BMI 32.06 kg/m²     Body mass index is 32.06 kg/m².         Review of Systems      Current Outpatient Medications:     ascorbic acid (VITAMIN C) 1000 MG tablet, Take 1 tablet by mouth 2 (two) times a day., Disp: , Rfl:     celecoxib (CeleBREX) 200 MG capsule, Take 1 capsule by mouth Daily., Disp: 90 capsule, Rfl: 0    famotidine (PEPCID) 40 MG tablet, Take 1 tablet by mouth Every Night., Disp: 90 tablet, Rfl: 0    lisinopril-hydrochlorothiazide (PRINZIDE,ZESTORETIC) 20-12.5 MG per tablet, Take 1 tablet by mouth Daily., Disp: 90 tablet, Rfl: 0    multivitamin with minerals tablet tablet, Take 1 tablet by mouth Daily., Disp: , Rfl:     rosuvastatin (CRESTOR) 10 MG tablet, Take 1 tablet by mouth Daily., Disp: 90 tablet, Rfl: 0    colchicine 0.6 MG tablet, 2 tab po once then 1 pill po 1 hour later - may also take as 1 tablet daily for prevention, Disp: 15 tablet, Rfl: 1    predniSONE (DELTASONE) 20 MG tablet, 2 tab po qd x 3 days then 1 tab po qd x 3 days, Disp: 9 tablet, Rfl: 0    Allergies: Bee venom, Omeprazole, Protonix [pantoprazole], and Sulfa " antibiotics    Physical Exam  Vitals and nursing note reviewed.   Constitutional:       General: He is not in acute distress.     Appearance: Normal appearance. He is not ill-appearing, toxic-appearing or diaphoretic.   Pulmonary:      Effort: Pulmonary effort is normal.   Musculoskeletal:        Feet:    Neurological:      Mental Status: He is alert.          Result Review :                   Assessment and Plan    Diagnoses and all orders for this visit:    1. Acute gout involving toe of right foot, unspecified cause (Primary)  -     Uric acid  -     predniSONE (DELTASONE) 20 MG tablet; 2 tab po qd x 3 days then 1 tab po qd x 3 days  Dispense: 9 tablet; Refill: 0    Will place patient on prednisone as well as colchicine.     Follow Up   No follow-ups on file.  Patient was given instructions and counseling regarding his condition or for health maintenance advice. Please see specific information pulled into the AVS if appropriate.     GABRIELLE Luna  12/21/2023

## 2023-12-22 LAB — URATE SERPL-MCNC: 4.8 MG/DL (ref 3.8–8.4)

## 2023-12-26 RX ORDER — COLCHICINE 0.6 MG/1
TABLET ORAL
Qty: 15 TABLET | Refills: 1 | Status: SHIPPED | OUTPATIENT
Start: 2023-12-26

## 2023-12-26 NOTE — PROGRESS NOTES
Please call patient and let him know that his uric acid level was 4.8 which is not terrible but certainly in range for a flare up- considering that things had calmed down a bit before we ingris the labs. How has he been doing?

## 2024-01-06 ENCOUNTER — PATIENT MESSAGE (OUTPATIENT)
Dept: FAMILY MEDICINE CLINIC | Facility: CLINIC | Age: 69
End: 2024-01-06
Payer: COMMERCIAL

## 2024-01-08 NOTE — TELEPHONE ENCOUNTER
Called to check to make sure he got his dads results. He said everything was taken care of and all is good. We were waiting on the culture-it came back normal

## 2024-02-09 ENCOUNTER — OFFICE VISIT (OUTPATIENT)
Dept: PAIN MEDICINE | Facility: CLINIC | Age: 69
End: 2024-02-09
Payer: MEDICARE

## 2024-02-09 VITALS — WEIGHT: 242 LBS | HEIGHT: 73 IN | BODY MASS INDEX: 32.07 KG/M2

## 2024-02-09 DIAGNOSIS — M96.1 POSTLAMINECTOMY SYNDROME: Primary | ICD-10-CM

## 2024-02-09 DIAGNOSIS — Z00.8 ENCOUNTER FOR PRE-SURGICAL PSYCHOLOGICAL ASSESSMENT: Primary | ICD-10-CM

## 2024-02-09 DIAGNOSIS — M54.16 LUMBAR RADICULOPATHY: ICD-10-CM

## 2024-02-09 PROCEDURE — 1125F AMNT PAIN NOTED PAIN PRSNT: CPT | Performed by: STUDENT IN AN ORGANIZED HEALTH CARE EDUCATION/TRAINING PROGRAM

## 2024-02-09 PROCEDURE — 1160F RVW MEDS BY RX/DR IN RCRD: CPT | Performed by: STUDENT IN AN ORGANIZED HEALTH CARE EDUCATION/TRAINING PROGRAM

## 2024-02-09 PROCEDURE — 99204 OFFICE O/P NEW MOD 45 MIN: CPT | Performed by: STUDENT IN AN ORGANIZED HEALTH CARE EDUCATION/TRAINING PROGRAM

## 2024-02-09 PROCEDURE — 1159F MED LIST DOCD IN RCRD: CPT | Performed by: STUDENT IN AN ORGANIZED HEALTH CARE EDUCATION/TRAINING PROGRAM

## 2024-02-09 NOTE — PROGRESS NOTES
Referring Physician: Darrel Workman MD  [id number] [facility] [address]  [place] [id number]    Primary Physician: Rigo Mustafa MD    CHIEF COMPLAINT or REASON FOR VISIT: Back Pain (New patient)      Initial history of present illness on 02/09/2024:  Mr. Behzad Peoples is 68 y.o. male who presents as a new patient referral for evaluation treatment of chronic axial low back pain with radiation to bilateral lower extremities and feet as well as a 6-month history of left-sided low back pain with radiation to the buttock.  Mr. Peoples has past medical history of lumbar laminectomy with Dr. Quiros several years ago.  At that time he had been experiencing significant problems with motor dysfunction and difficulty ambulating.  The symptoms resolved with surgery however he continued to have low back pain and bilateral foot numbness and tingling especially with prolonged standing ambulation.  On the last 6 months he has noticed worsening of his left-sided low back pain again with ambulation and prolonged standing.  He sought consultation with Dr. Workman, neurosurgery, who identified a very low-grade L4-5 listhesis which appears to be dynamic on x-ray.  Patient and wife would very much like to avoid lumbar fusion or further surgery.  Patient denies any bowel or bladder dysfunction, lower extremity weakness, new onset saddle anesthesia or unexplained weight loss.   He did have epidural steroid injections prior to his last surgery with variable effect.    Interval history:    Interventions:      Objective Pain Scoring:   BRIEF PAIN INVENTORY:  Total score:   Pain Score    02/09/24 0856   PainSc:   4   PainLoc: Back      PHQ-2: PHQ-2 Total Score: 0  PHQ-9: PHQ-9: Brief Depression Severity Measure Score: 0  Opioid Risk Tool:         Review of Systems:   ROS negative except as otherwise noted     Past Medical History:   Past Medical History:   Diagnosis Date    Arthritis     Bronchitis     Bulging of cervical  intervertebral disc 06/21/2018    GERD (gastroesophageal reflux disease)     History of SCC (squamous cell carcinoma) of skin     Hyperlipidemia     Hypertension     Low back pain 1988    Melanoma     Melanoma     scalp    Osteoarthritis 2006    Spinal stenosis 2006    Arthritis and stenosis throughout spine    Wears glasses          Past Surgical History:   Past Surgical History:   Procedure Laterality Date    BACK SURGERY  3/2021    Laminectomy x2    COLONOSCOPY  2014    HERNIA REPAIR  8/2021    LUMBAR LAMINECTOMY DISCECTOMY DECOMPRESSION Bilateral 03/19/2021    Procedure: LUMBAR DECOMPRESSION L3, L4;  Surgeon: Saran Quiros MD;  [facility];  Service: Neurosurgery;  Laterality: Bilateral;    SKIN CANCER EXCISION      SPINE SURGERY  3/2021    Laminectomy x 2         Family History   Family History   Problem Relation Age of Onset    Arthritis Father     Diabetes Father     Hearing loss Father     Hyperlipidemia Father     Thyroid disease Father          Social History   Social History     Socioeconomic History    Marital status:    Tobacco Use    Smoking status: Never    Smokeless tobacco: Never   Vaping Use    Vaping Use: Never used   Substance and Sexual Activity    Alcohol use: Yes     Comment: 1 drink a week    Drug use: No    Sexual activity: Defer        Medications:     Current Outpatient Medications:     ascorbic acid (VITAMIN C) 1000 MG tablet, Take 1 tablet by mouth 2 (two) times a day., Disp: , Rfl:     celecoxib (CeleBREX) 200 MG capsule, Take 1 capsule by mouth Daily., Disp: 90 capsule, Rfl: 0    colchicine 0.6 MG tablet, 2 tab po once then 1 pill po 1 hour later - may also take as 1 tablet daily for prevention, Disp: 15 tablet, Rfl: 1    famotidine (PEPCID) 40 MG tablet, Take 1 tablet by mouth Every Night., Disp: 90 tablet, Rfl: 0    lisinopril-hydrochlorothiazide (PRINZIDE,ZESTORETIC) 20-12.5 MG per tablet, Take 1 tablet by mouth Daily., Disp: 90 tablet, Rfl: 0    multivitamin  "with minerals tablet tablet, Take 1 tablet by mouth Daily., Disp: , Rfl:     rosuvastatin (CRESTOR) 10 MG tablet, Take 1 tablet by mouth Daily., Disp: 90 tablet, Rfl: 0    predniSONE (DELTASONE) 20 MG tablet, 2 tab po qd x 3 days then 1 tab po qd x 3 days, Disp: 9 tablet, Rfl: 0        Physical Exam:     Vitals:    02/09/24 0856   Weight: 110 kg (242 lb)   Height: 185.4 cm (73\")   PainSc:   4   PainLoc: Back        General: Alert and oriented, No acute distress.   HEENT: Normocephalic, atraumatic.   Cardiovascular: No gross edema  Respiratory: Respirations are non-labored    Lumbar Spine:   No masses or atrophy  Range of motion - Flexion normal. Extension normal.    Facet Loading: Negative bilaterally  Facet Palpation - Nontender   PSIS tenderness - Negative bilaterally  Derek's/KATIE/Thigh thrust -   Straight leg raise/slump test: Negative bilaterally      Motor Exam:        Strength: Rate on 1-5 scale Right Left    L1/2- hip flexion 5/5  5/5    L3- knee extension 5/5  5/5    L4- ankle dorsiflexion 5/5  5/5    L5- great toe extension 5/5  5/5    S1- ankle plantarflexion 5/5  5/5    Sensory Exam: Full and equal sensation to light touch throughout.  Neurologic: Cranial Nerves II-XII are grossly intact.   Psychiatric: Cooperative.   Gait: Antalgic flexed forward  Assistive Devices: None    Imaging Studies:   Results for orders placed during the hospital encounter of 10/06/23    MRI Lumbar Spine Without Contrast    Narrative  MRI LUMBAR SPINE WO CONTRAST    Date of Exam: 10/6/2023 2:23 PM EDT    Indication: low back pain.    Comparison: Radiographs 8/21/2023.    Technique:  Routine multiplanar/multisequence sequence images of the lumbar spine were obtained without contrast administration.    Findings:  There is evidence of prior posterior decompression at L3-4 and L4-5. T1 marrow signal is preserved, without evidence of fracture or suspicious marrow replacing lesion. There is mild grade 1 anterolisthesis of L3 " on L4 and L4 on L5. The conus medullaris  and cauda equina nerve roots are satisfactory in appearance. The paraspinal soft tissues demonstrate no acute or suspicious findings. Multilevel spondylosis is present, with areas of involvement including    L1-2, no significant spinal canal or neuroforaminal impingement.    L2-3, small disc bulge and bilateral facet arthropathy with some ligamentum flavum thickening. There is moderate spinal canal narrowing and moderate to severe bilateral neuroforaminal stenosis, greater on the left.    L3-4, disc bulge is present, eccentric to the left. The spinal canal is patent following operative decompression. There is severe left and mild right neuroforaminal narrowing.    L4-5, postoperative changes with circumferential disc bulge and bilateral facet arthropathy present. The spinal canal remains decompressed. There is moderate to severe left and mild right neuroforaminal narrowing.    L5-S1, small disc bulge and bilateral facet arthropathy. The spinal canal is patent. There is moderate bilateral neuroforaminal narrowing, slightly worse on the left.    Impression  Impression:  Patent spinal canal at the previously decompressed L3-4 and L4-5 levels. These levels demonstrate some listhesis and areas of disc bulge with resultant severe narrowing at the left neural foramen at each level. There is also some moderate spinal canal  narrowing and moderate to severe bilateral neuroforaminal stenosis at the adjacent L2-3 level.    Electronically Signed: Thanh Field MD  10/6/2023 4:28 PM EDT  Workstation ID: [id number]      Results for orders placed during the hospital encounter of 12/07/20    MRI Lumbar Spine Without Contrast    Narrative  EXAMINATION: MRI LUMBAR SPINE WO CONTRAST-    INDICATION: Back and leg pain after fall; M43.16-Spondylolisthesis,  lumbar region; M48.062-Spinal stenosis, lumbar region with neurogenic  claudication; right leg pain and weakness.    TECHNIQUE: Routine  multiplanar imaging was obtained of the lumbar spine  without the administration of gadolinium contrast.    COMPARISON: None.    FINDINGS: There is disc desiccation seen at the L2-L3, L3-L4, L4-L5 and  L5-S1 levels. Mild anterior spurring and posterior osteophyte formation  identified. There is mild anterolisthesis of L3 on L4. Normal signal  intensity within the conus. The spinal cord terminates at the L1 level.  The facets are well aligned. No abnormal mass or fluid collection is  seen within the paraspinal muscles.    Axial imaging reveals at the L1-L2 level with no significant central  spinal canal stenosis. No neural foraminal narrowing or nerve root  compromise.    At the L2-L3 level, there is a broad-based disc bulge with some  lateralization to the left. There is moderate narrowing of the left  neuroforamina. Mild to moderate central spinal canal stenosis. Nerve  root contact on the left cannot be excluded.    At the L3-L4 level, there is a broad-based disc bulge with  lateralization to the left. Severe narrowing of the left neuroforamina.  Moderate to severe central spinal canal stenosis with degenerative  changes seen in the posterior facets and thickening of the posterior  ligamentum flavum.    At the L4-L5 level, there is a broad-based disc bulge identified with  some lateralization to the right creating moderate to severe right  neuroforaminal narrowing and possible nerve root contact on the right.  Moderate to severe central spinal canal stenosis.    At the L5-S1 level, there is a broad-based disc bulge creating narrowing  of the neuroforamina bilaterally right greater than left. Nerve root  contact on the right cannot be excluded.    Impression  Multilevel degenerative changes identified diffusely  throughout the lumbar spine from L2 through S1. There is neural  foraminal narrowing on the left at the L2-L3 and L3-L4 level and neural  foraminal narrowing on the right at the L4-L5 and L5-S1 level. There  is  moderate to severe central spinal canal stenosis most pronounced at the  L3-L4 level in which nerve root contact and compromise cannot be  excluded. Clinical correlation is needed.    D:  12/07/2020  E:  12/07/2020    This report was finalized on 12/7/2020 9:01 PM by Dr. Vale Hair MD.      Impression & Plan:       02/09/2024: Behzad Peoples is a 68 y.o. male with past medical history significant for GERD, HLD, HTN, L3, L4 laminectomy in 2021 with Dr. Quiros who presents to the pain clinic for evaluation and treatment of persistent low back pain with radiation to bilateral lower extremities.  I personally reviewed and interpreted his lumbar MRI demonstrating: L5/S1 DDD; L3/L4 grade 1 anterolisthesis; L4/L5 grade 1 anterolisthesis; left L3/L4 NFS; L3/4 and L4/5 facet spondylosis; multifidus atrophy.  Examination consistent with lumbar radiculopathy, postlaminectomy syndrome.  We discussed epidural steroid injection to improve pain.  If greater than 50% relief for at least 2-3 months can consider repeat as needed every 3 to 4 months.  I had a discussion with the patient regarding the risks of the procedure including bleeding, infection, damage to surrounding structures.  We discussed the potential adverse effects of corticosteroid injection including flushing of the face, lipodystrophy, skin discoloration, elevated blood glucose, increased blood pressure.  Risks of frequent steroid administration include weight gain, hormonal changes, mood changes, osteoporosis.  Additionally discussed spinal cord stimulator trial.  Patient would like to pursue trial, and he first needs to find elder care for his father.    1. Postlaminectomy syndrome    2. Lumbar radiculopathy        PLAN:  1. Medication Recommendations: Recommend Voltaren topical, NSAIDs, Tylenol.  Can trial turmeric 500 mg twice daily if NSAID contraindicated.    2. Physical Therapy: Continue HEP    3. Psychological: Obtain SCS clearance    4.  Complementary and alternative (CAM) Therapies:     5. Labs/Diagnostic studies: None indicated     6. Imaging: MRI independently interpreted and reviewed with patient    7. Interventions: Schedule left L3/L4 and L4/L5 transforaminal epidural steroid injection ([id number], [id number]).  Will discuss SCS trial timing at next office visit.  Will have [employer] representative call to educate.    8. Referrals: Psychiatry for implantable device screening    9. Records: Neurosurgery notes reviewed    10. Lifestyle goals:    Follow-up 6 weeks after injection      [facility]  Dami Ricardo MD

## 2024-02-12 ENCOUNTER — TELEPHONE (OUTPATIENT)
Dept: PAIN MEDICINE | Facility: CLINIC | Age: 69
End: 2024-02-12
Payer: COMMERCIAL

## 2024-02-15 ENCOUNTER — OUTSIDE FACILITY SERVICE (OUTPATIENT)
Dept: PAIN MEDICINE | Facility: CLINIC | Age: 69
End: 2024-02-15
Payer: COMMERCIAL

## 2024-02-15 ENCOUNTER — DOCUMENTATION (OUTPATIENT)
Dept: PAIN MEDICINE | Facility: CLINIC | Age: 69
End: 2024-02-15

## 2024-03-16 DIAGNOSIS — I10 BENIGN ESSENTIAL HTN: ICD-10-CM

## 2024-03-16 DIAGNOSIS — M51.36 DEGENERATIVE DISC DISEASE, LUMBAR: ICD-10-CM

## 2024-03-18 RX ORDER — LISINOPRIL AND HYDROCHLOROTHIAZIDE 20; 12.5 MG/1; MG/1
1 TABLET ORAL DAILY
Qty: 90 TABLET | Refills: 0 | Status: SHIPPED | OUTPATIENT
Start: 2024-03-18

## 2024-03-18 RX ORDER — CELECOXIB 200 MG/1
200 CAPSULE ORAL DAILY
Qty: 90 CAPSULE | Refills: 0 | Status: SHIPPED | OUTPATIENT
Start: 2024-03-18

## 2024-04-16 ENCOUNTER — OFFICE VISIT (OUTPATIENT)
Dept: PAIN MEDICINE | Facility: CLINIC | Age: 69
End: 2024-04-16
Payer: MEDICARE

## 2024-04-16 VITALS — WEIGHT: 244.9 LBS | HEIGHT: 73 IN | BODY MASS INDEX: 32.46 KG/M2

## 2024-04-16 DIAGNOSIS — M54.16 LUMBAR RADICULOPATHY: ICD-10-CM

## 2024-04-16 DIAGNOSIS — M96.1 POSTLAMINECTOMY SYNDROME: Primary | ICD-10-CM

## 2024-04-16 PROCEDURE — 1159F MED LIST DOCD IN RCRD: CPT

## 2024-04-16 PROCEDURE — 1126F AMNT PAIN NOTED NONE PRSNT: CPT

## 2024-04-16 PROCEDURE — 1160F RVW MEDS BY RX/DR IN RCRD: CPT

## 2024-04-16 PROCEDURE — 99213 OFFICE O/P EST LOW 20 MIN: CPT

## 2024-04-16 NOTE — PROGRESS NOTES
Referring Physician: No referring provider defined for this encounter.    Primary Physician: Rigo Mustafa MD    CHIEF COMPLAINT or REASON FOR VISIT: Follow-up (Post TFESI Lumbar//Patient states he is doing better. ) and Back Pain      Initial history of present illness on 02/09/2024:  Mr. Behzad Peoples is 68 y.o. male who presents as a new patient referral for evaluation treatment of chronic axial low back pain with radiation to bilateral lower extremities and feet as well as a 6-month history of left-sided low back pain with radiation to the buttock.  Mr. Peoples has past medical history of lumbar laminectomy with Dr. Quiros several years ago.  At that time he had been experiencing significant problems with motor dysfunction and difficulty ambulating.  The symptoms resolved with surgery however he continued to have low back pain and bilateral foot numbness and tingling especially with prolonged standing ambulation.  On the last 6 months he has noticed worsening of his left-sided low back pain again with ambulation and prolonged standing.  He sought consultation with Dr. Workman, neurosurgery, who identified a very low-grade L4-5 listhesis which appears to be dynamic on x-ray.  Patient and wife would very much like to avoid lumbar fusion or further surgery.  Patient denies any bowel or bladder dysfunction, lower extremity weakness, new onset saddle anesthesia or unexplained weight loss.   He did have epidural steroid injections prior to his last surgery with variable effect.    Interval history: Patient returns to clinic after undergoing left-sided transforaminal epidural steroid injections.  He reports excellent pain relief from these injections.  Patient reports this is most pain relief he is received within the last 20 years.  He has noticed an improvement in his everyday activities as well.  He is interested in repeat injections if needed.  Patient also reports if repeat injections are ineffective he  would like to pursue a spinal cord stimulator trial.  Patient has already obtain psychiatric clearance from [facility] for implanted device.    Interventions:  2/15/2024: Left L3/4 and L4/5 TFESI with 100% relief ongoing    Objective Pain Scoring:   BRIEF PAIN INVENTORY:  Total score:   Pain Score    04/16/24 0813   PainSc: 0-No pain   PainLoc: Back        PHQ-2: PHQ-2 Total Score: 0  PHQ-9: PHQ-9: Brief Depression Severity Measure Score: 0  Opioid Risk Tool:         Review of Systems:   ROS negative except as otherwise noted     Past Medical History:   Past Medical History:   Diagnosis Date    Arthritis     Bronchitis     Bulging of cervical intervertebral disc 06/21/2018    GERD (gastroesophageal reflux disease)     History of SCC (squamous cell carcinoma) of skin     Hyperlipidemia     Hypertension     Low back pain 1988    Melanoma     Melanoma     scalp    Osteoarthritis 2006    Spinal stenosis 2006    Arthritis and stenosis throughout spine    Wears glasses          Past Surgical History:   Past Surgical History:   Procedure Laterality Date    BACK SURGERY  3/2021    Laminectomy x2    COLONOSCOPY  2014    HERNIA REPAIR  8/2021    LUMBAR LAMINECTOMY DISCECTOMY DECOMPRESSION Bilateral 03/19/2021    Procedure: LUMBAR DECOMPRESSION L3, L4;  Surgeon: Saran Quiros MD;  Location: [facility];  Service: Neurosurgery;  Laterality: Bilateral;    SKIN CANCER EXCISION      SPINE SURGERY  3/2021    Laminectomy x 2         Family History   Family History   Problem Relation Age of Onset    Arthritis Father     Diabetes Father     Hearing loss Father     Hyperlipidemia Father     Thyroid disease Father          Social History   Social History     Socioeconomic History    Marital status:    Tobacco Use    Smoking status: Never    Smokeless tobacco: Never   Vaping Use    Vaping status: Never Used   Substance and Sexual Activity    Alcohol use: Yes     Comment: 1 drink a week    Drug use: No    Sexual  "activity: Defer        Medications:     Current Outpatient Medications:     ascorbic acid (VITAMIN C) 1000 MG tablet, Take 1 tablet by mouth 2 (two) times a day., Disp: , Rfl:     celecoxib (CeleBREX) 200 MG capsule, Take 1 capsule by mouth Daily., Disp: 90 capsule, Rfl: 0    colchicine 0.6 MG tablet, 2 tab po once then 1 pill po 1 hour later - may also take as 1 tablet daily for prevention, Disp: 15 tablet, Rfl: 1    famotidine (PEPCID) 40 MG tablet, Take 1 tablet by mouth Every Night., Disp: 90 tablet, Rfl: 0    lisinopril-hydrochlorothiazide (PRINZIDE,ZESTORETIC) 20-12.5 MG per tablet, Take 1 tablet by mouth Daily., Disp: 90 tablet, Rfl: 0    multivitamin with minerals tablet tablet, Take 1 tablet by mouth Daily., Disp: , Rfl:     rosuvastatin (CRESTOR) 10 MG tablet, Take 1 tablet by mouth Daily., Disp: 90 tablet, Rfl: 0        Physical Exam:     Vitals:    04/16/24 0813   Weight: 111 kg (244 lb 14.4 oz)   Height: 185.4 cm (72.99\")   PainSc: 0-No pain   PainLoc: Back        General: Alert and oriented, No acute distress.   HEENT: Normocephalic, atraumatic.   Cardiovascular: No gross edema  Respiratory: Respirations are non-labored    Lumbar Spine:   No masses or atrophy  Range of motion - Flexion normal. Extension normal.    Facet Loading: Negative bilaterally  Facet Palpation - Nontender   PSIS tenderness - Negative bilaterally  Derek's/KATIE/Thigh thrust -   Straight leg raise/slump test: Negative bilaterally      Motor Exam:        Strength: Rate on 1-5 scale Right Left    L1/2- hip flexion 5/5  5/5    L3- knee extension 5/5  5/5    L4- ankle dorsiflexion 5/5  5/5    L5- great toe extension 5/5  5/5    S1- ankle plantarflexion 5/5  5/5    Sensory Exam: Full and equal sensation to light touch throughout.  Neurologic: Cranial Nerves II-XII are grossly intact.   Psychiatric: Cooperative.   Gait: Antalgic flexed forward  Assistive Devices: None    Physical exam is consistent and accurate as of " 4/16/2024    Imaging Studies:   Results for orders placed during the hospital encounter of 10/06/23    MRI Lumbar Spine Without Contrast    Narrative  MRI LUMBAR SPINE WO CONTRAST    Date of Exam: 10/6/2023 2:23 PM EDT    Indication: low back pain.    Comparison: Radiographs 8/21/2023.    Technique:  Routine multiplanar/multisequence sequence images of the lumbar spine were obtained without contrast administration.    Findings:  There is evidence of prior posterior decompression at L3-4 and L4-5. T1 marrow signal is preserved, without evidence of fracture or suspicious marrow replacing lesion. There is mild grade 1 anterolisthesis of L3 on L4 and L4 on L5. The conus medullaris  and cauda equina nerve roots are satisfactory in appearance. The paraspinal soft tissues demonstrate no acute or suspicious findings. Multilevel spondylosis is present, with areas of involvement including    L1-2, no significant spinal canal or neuroforaminal impingement.    L2-3, small disc bulge and bilateral facet arthropathy with some ligamentum flavum thickening. There is moderate spinal canal narrowing and moderate to severe bilateral neuroforaminal stenosis, greater on the left.    L3-4, disc bulge is present, eccentric to the left. The spinal canal is patent following operative decompression. There is severe left and mild right neuroforaminal narrowing.    L4-5, postoperative changes with circumferential disc bulge and bilateral facet arthropathy present. The spinal canal remains decompressed. There is moderate to severe left and mild right neuroforaminal narrowing.    L5-S1, small disc bulge and bilateral facet arthropathy. The spinal canal is patent. There is moderate bilateral neuroforaminal narrowing, slightly worse on the left.    Impression  Impression:  Patent spinal canal at the previously decompressed L3-4 and L4-5 levels. These levels demonstrate some listhesis and areas of disc bulge with resultant severe narrowing at the  left neural foramen at each level. There is also some moderate spinal canal  narrowing and moderate to severe bilateral neuroforaminal stenosis at the adjacent L2-3 level.    Electronically Signed: Thanh Field MD  10/6/2023 4:28 PM EDT  Workstation ID: [id number]      Results for orders placed during the hospital encounter of 12/07/20    MRI Lumbar Spine Without Contrast    Narrative  EXAMINATION: MRI LUMBAR SPINE WO CONTRAST-    INDICATION: Back and leg pain after fall; M43.16-Spondylolisthesis,  lumbar region; M48.062-Spinal stenosis, lumbar region with neurogenic  claudication; right leg pain and weakness.    TECHNIQUE: Routine multiplanar imaging was obtained of the lumbar spine  without the administration of gadolinium contrast.    COMPARISON: None.    FINDINGS: There is disc desiccation seen at the L2-L3, L3-L4, L4-L5 and  L5-S1 levels. Mild anterior spurring and posterior osteophyte formation  identified. There is mild anterolisthesis of L3 on L4. Normal signal  intensity within the conus. The spinal cord terminates at the L1 level.  The facets are well aligned. No abnormal mass or fluid collection is  seen within the paraspinal muscles.    Axial imaging reveals at the L1-L2 level with no significant central  spinal canal stenosis. No neural foraminal narrowing or nerve root  compromise.    At the L2-L3 level, there is a broad-based disc bulge with some  lateralization to the left. There is moderate narrowing of the left  neuroforamina. Mild to moderate central spinal canal stenosis. Nerve  root contact on the left cannot be excluded.    At the L3-L4 level, there is a broad-based disc bulge with  lateralization to the left. Severe narrowing of the left neuroforamina.  Moderate to severe central spinal canal stenosis with degenerative  changes seen in the posterior facets and thickening of the posterior  ligamentum flavum.    At the L4-L5 level, there is a broad-based disc bulge identified with  some  lateralization to the right creating moderate to severe right  neuroforaminal narrowing and possible nerve root contact on the right.  Moderate to severe central spinal canal stenosis.    At the L5-S1 level, there is a broad-based disc bulge creating narrowing  of the neuroforamina bilaterally right greater than left. Nerve root  contact on the right cannot be excluded.    Impression  Multilevel degenerative changes identified diffusely  throughout the lumbar spine from L2 through S1. There is neural  foraminal narrowing on the left at the L2-L3 and L3-L4 level and neural  foraminal narrowing on the right at the L4-L5 and L5-S1 level. There is  moderate to severe central spinal canal stenosis most pronounced at the  L3-L4 level in which nerve root contact and compromise cannot be  excluded. Clinical correlation is needed.    D:  12/07/2020  E:  12/07/2020    This report was finalized on 12/7/2020 9:01 PM by Dr. Vale Hair MD.      Impression & Plan:       02/09/2024: Behzad Peoples is a 68 y.o. male with past medical history significant for GERD, HLD, HTN, L3, L4 laminectomy in 2021 with Dr. Quiros who presents to the pain clinic for evaluation and treatment of persistent low back pain with radiation to bilateral lower extremities.  I personally reviewed and interpreted his lumbar MRI demonstrating: L5/S1 DDD; L3/L4 grade 1 anterolisthesis; L4/L5 grade 1 anterolisthesis; left L3/L4 NFS; L3/4 and L4/5 facet spondylosis; multifidus atrophy.  Examination consistent with lumbar radiculopathy, postlaminectomy syndrome.  We discussed epidural steroid injection to improve pain.  If greater than 50% relief for at least 2-3 months can consider repeat as needed every 3 to 4 months.  I had a discussion with the patient regarding the risks of the procedure including bleeding, infection, damage to surrounding structures.  We discussed the potential adverse effects of corticosteroid injection including flushing of the  face, lipodystrophy, skin discoloration, elevated blood glucose, increased blood pressure.  Risks of frequent steroid administration include weight gain, hormonal changes, mood changes, osteoporosis.  Additionally discussed spinal cord stimulator trial.  Patient would like to pursue trial, and he first needs to find elder care for his father.    2024: Excellent relief from TFESI.  Can repeat if needed.  If minimal or transient benefit we will proceed with SCS trial  1. Postlaminectomy syndrome    2. Lumbar radiculopathy          PLAN:  1. Medication Recommendations: Recommend Voltaren topical, NSAIDs, Tylenol.  Can trial turmeric 500 mg twice daily if NSAID contraindicated.    2. Physical Therapy: Continue HEP    3. Psychological: Psychiatric clearance has already been obtained at last office visit    4. Complementary and alternative (CAM) Therapies:     5. Labs/Diagnostic studies: None indicated     6. Imagin. Interventions: Can consider repeat left L3/L4 and L4/L5 transforaminal epidural steroid injection (06651, [id number]).  If minimal or transient benefit from repeat injection we will pursue SCS trial with [employer].     8. Referrals:     9. Records:     10. Lifestyle goals:    Follow-up 3-4 months in [facility]  Nika Mancia PA-C

## 2024-04-19 ENCOUNTER — OFFICE VISIT (OUTPATIENT)
Dept: FAMILY MEDICINE CLINIC | Facility: CLINIC | Age: 69
End: 2024-04-19
Payer: MEDICARE

## 2024-04-19 VITALS
WEIGHT: 243 LBS | OXYGEN SATURATION: 96 % | DIASTOLIC BLOOD PRESSURE: 76 MMHG | HEIGHT: 73 IN | SYSTOLIC BLOOD PRESSURE: 128 MMHG | BODY MASS INDEX: 32.2 KG/M2 | HEART RATE: 64 BPM

## 2024-04-19 DIAGNOSIS — K64.5 THROMBOSED EXTERNAL HEMORRHOID: Primary | ICD-10-CM

## 2024-04-19 PROCEDURE — 3074F SYST BP LT 130 MM HG: CPT | Performed by: STUDENT IN AN ORGANIZED HEALTH CARE EDUCATION/TRAINING PROGRAM

## 2024-04-19 PROCEDURE — 99214 OFFICE O/P EST MOD 30 MIN: CPT | Performed by: STUDENT IN AN ORGANIZED HEALTH CARE EDUCATION/TRAINING PROGRAM

## 2024-04-19 PROCEDURE — 3078F DIAST BP <80 MM HG: CPT | Performed by: STUDENT IN AN ORGANIZED HEALTH CARE EDUCATION/TRAINING PROGRAM

## 2024-04-19 NOTE — PROGRESS NOTES
"Chief Complaint  Hemorrhoids    Subjective          Behzad Peoples presents to [facility]  History of Present Illness    Patient states that he has been having trouble with a Hemorrhoid for the past couple of days. He states that he can't tell if it is getting bigger, but it is painful and does not seem to be getting better.  He states that it does feel like it is swollen both inside and outside.  He is here for further evaluation of this.        Objective   Vital Signs:   /76   Pulse 64   Ht 185.4 cm (73\")   Wt 110 kg (243 lb)   SpO2 96%   BMI 32.06 kg/m²     Body mass index is 32.06 kg/m².    Review of Systems    Past History:  Medical History: has a past medical history of Allergic, Arthritis, Bronchitis, Bulging of cervical intervertebral disc (06/21/2018), GERD (gastroesophageal reflux disease), History of SCC (squamous cell carcinoma) of skin, Hyperlipidemia, Hypertension, Low back pain (1988), Melanoma, Melanoma, Osteoarthritis (2006), Spinal stenosis (2006), and Wears glasses.   Surgical History: has a past surgical history that includes Skin cancer excision; Colonoscopy (2014); lumbar laminectomy discectomy decompression (Bilateral, 03/19/2021); Back surgery (3/2021); Hernia repair (8/2021); and Spine surgery (3/2021).   Family History: family history includes Arthritis in his father; Diabetes in his father; Hearing loss in his father; Hyperlipidemia in his father; Thyroid disease in his father.   Social History: reports that he has never smoked. He has never used smokeless tobacco. He reports that he does not currently use alcohol. He reports that he does not use drugs.      Current Outpatient Medications:     ascorbic acid (VITAMIN C) 1000 MG tablet, Take 1 tablet by mouth 2 (two) times a day., Disp: , Rfl:     celecoxib (CeleBREX) 200 MG capsule, Take 1 capsule by mouth Daily., Disp: 90 capsule, Rfl: 0    colchicine 0.6 MG tablet, 2 tab po once then 1 pill " po 1 hour later - may also take as 1 tablet daily for prevention, Disp: 15 tablet, Rfl: 1    famotidine (PEPCID) 40 MG tablet, Take 1 tablet by mouth Every Night., Disp: 90 tablet, Rfl: 0    lisinopril-hydrochlorothiazide (PRINZIDE,ZESTORETIC) 20-12.5 MG per tablet, Take 1 tablet by mouth Daily., Disp: 90 tablet, Rfl: 0    multivitamin with minerals tablet tablet, Take 1 tablet by mouth Daily., Disp: , Rfl:     rosuvastatin (CRESTOR) 10 MG tablet, Take 1 tablet by mouth Daily., Disp: 90 tablet, Rfl: 0    Allergies: Bee venom, Omeprazole, Protonix [pantoprazole], and Sulfa antibiotics    Physical Exam  Constitutional:       General: He is not in acute distress.     Appearance: He is not ill-appearing or toxic-appearing.   HENT:      Head: Normocephalic and atraumatic.   Pulmonary:      Effort: Pulmonary effort is normal. No respiratory distress.   Genitourinary:     Comments: Large, half dollar thrombosed hemorrhoid with area of necrosis medially  Neurological:      General: No focal deficit present.      Mental Status: He is alert and oriented to person, place, and time.   Psychiatric:         Mood and Affect: Mood normal.         Thought Content: Thought content normal.          Result Review :                   Assessment and Plan    Diagnoses and all orders for this visit:    1. Thrombosed external hemorrhoid (Primary)  -     Ambulatory Referral to General Surgery    Urgent referral to General surgery for management. Patient was advised to proceed directly to [facility] to see Dr. Tavarez, and he was agreeable to this. Call with any new or worsening symptoms.       Follow Up   No follow-ups on file.  Patient was given instructions and counseling regarding his condition or for health maintenance advice. Please see specific information pulled into the AVS if appropriate.     Faina Terrazas, DO

## 2024-05-16 ENCOUNTER — OFFICE VISIT (OUTPATIENT)
Dept: FAMILY MEDICINE CLINIC | Facility: CLINIC | Age: 69
End: 2024-05-16
Payer: MEDICARE

## 2024-05-16 VITALS
DIASTOLIC BLOOD PRESSURE: 80 MMHG | BODY MASS INDEX: 32.34 KG/M2 | SYSTOLIC BLOOD PRESSURE: 130 MMHG | OXYGEN SATURATION: 98 % | HEART RATE: 63 BPM | WEIGHT: 244 LBS | HEIGHT: 73 IN

## 2024-05-16 DIAGNOSIS — M51.36 DEGENERATIVE DISC DISEASE, LUMBAR: ICD-10-CM

## 2024-05-16 DIAGNOSIS — E78.2 MIXED HYPERLIPIDEMIA: ICD-10-CM

## 2024-05-16 DIAGNOSIS — I10 BENIGN ESSENTIAL HTN: ICD-10-CM

## 2024-05-16 DIAGNOSIS — Z00.00 MEDICARE ANNUAL WELLNESS VISIT, SUBSEQUENT: ICD-10-CM

## 2024-05-16 DIAGNOSIS — K21.9 GASTROESOPHAGEAL REFLUX DISEASE, UNSPECIFIED WHETHER ESOPHAGITIS PRESENT: ICD-10-CM

## 2024-05-16 DIAGNOSIS — Z00.00 ROUTINE GENERAL MEDICAL EXAMINATION AT A HEALTH CARE FACILITY: Primary | ICD-10-CM

## 2024-05-16 DIAGNOSIS — Z12.5 PROSTATE CANCER SCREENING: ICD-10-CM

## 2024-05-16 PROCEDURE — G0439 PPPS, SUBSEQ VISIT: HCPCS | Performed by: FAMILY MEDICINE

## 2024-05-16 PROCEDURE — 3075F SYST BP GE 130 - 139MM HG: CPT | Performed by: FAMILY MEDICINE

## 2024-05-16 PROCEDURE — 3079F DIAST BP 80-89 MM HG: CPT | Performed by: FAMILY MEDICINE

## 2024-05-16 PROCEDURE — 1126F AMNT PAIN NOTED NONE PRSNT: CPT | Performed by: FAMILY MEDICINE

## 2024-05-16 PROCEDURE — 1170F FXNL STATUS ASSESSED: CPT | Performed by: FAMILY MEDICINE

## 2024-05-16 RX ORDER — FAMOTIDINE 40 MG/1
40 TABLET, FILM COATED ORAL NIGHTLY
Qty: 90 TABLET | Refills: 3 | Status: SHIPPED | OUTPATIENT
Start: 2024-05-16

## 2024-05-16 RX ORDER — CELECOXIB 200 MG/1
200 CAPSULE ORAL DAILY
Qty: 90 CAPSULE | Refills: 3 | Status: SHIPPED | OUTPATIENT
Start: 2024-05-16

## 2024-05-16 RX ORDER — EZETIMIBE 10 MG/1
10 TABLET ORAL DAILY
Qty: 90 TABLET | Refills: 1 | Status: SHIPPED | OUTPATIENT
Start: 2024-05-16

## 2024-05-16 RX ORDER — LISINOPRIL AND HYDROCHLOROTHIAZIDE 20; 12.5 MG/1; MG/1
1 TABLET ORAL DAILY
Qty: 90 TABLET | Refills: 3 | Status: SHIPPED | OUTPATIENT
Start: 2024-05-16

## 2024-05-16 NOTE — PROGRESS NOTES
The ABCs of the Annual Wellness Visit  Subsequent Medicare Wellness Visit    Subjective      Behzad Peoples is a 68 y.o. male who presents for a Subsequent Medicare Wellness Visit.    The following portions of the patient's history were reviewed and   updated as appropriate: allergies, current medications, past family history, past medical history, past social history, past surgical history, and problem list.    Compared to one year ago, the patient feels his physical   health is the same.    Compared to one year ago, the patient feels his mental   health is the same.    Recent Hospitalizations:  He was not admitted to the hospital during the last year.       Current Medical Providers:  Patient Care Team:  Rigo Mustafa MD as PCP - General (Family Medicine)  Alvin Terry MD as Consulting Physician (Anesthesiology)  Derek Piña APRN as Referring Physician (Nurse Practitioner)    Outpatient Medications Prior to Visit   Medication Sig Dispense Refill    multivitamin with minerals tablet tablet Take 1 tablet by mouth Daily.      celecoxib (CeleBREX) 200 MG capsule Take 1 capsule by mouth Daily. 90 capsule 0    famotidine (PEPCID) 40 MG tablet Take 1 tablet by mouth Every Night. 90 tablet 0    lisinopril-hydrochlorothiazide (PRINZIDE,ZESTORETIC) 20-12.5 MG per tablet Take 1 tablet by mouth Daily. 90 tablet 0    ascorbic acid (VITAMIN C) 1000 MG tablet Take 1 tablet by mouth 2 (two) times a day.      colchicine 0.6 MG tablet 2 tab po once then 1 pill po 1 hour later - may also take as 1 tablet daily for prevention (Patient not taking: Reported on 5/16/2024) 15 tablet 1    rosuvastatin (CRESTOR) 10 MG tablet Take 1 tablet by mouth Daily. 90 tablet 0     No facility-administered medications prior to visit.       No opioid medication identified on active medication list. I have reviewed chart for other potential  high risk medication/s and harmful drug interactions in the elderly.        Aspirin is  "not on active medication list.  Aspirin use is not indicated based on review of current medical condition/s. Risk of harm outweighs potential benefits.  .    Patient Active Problem List   Diagnosis    Degenerative disc disease, lumbar    Herniated lumbar intervertebral disc    Numbness and tingling    Degenerative disc disease, cervical    Bilateral carpal tunnel syndrome    Bulging of cervical intervertebral disc    GERD (gastroesophageal reflux disease)    Advanced directives, counseling/discussion    Screening for prostate cancer    Screening for colon cancer    Need for hepatitis C screening test    Mixed hyperlipidemia    Allergic rhinitis    Initial Medicare annual wellness visit    Benign essential HTN    Needs flu shot    Acute left-sided low back pain without sciatica     Advance Care Planning   Advance Care Planning     Advance Directive is on file.  ACP discussion was held with the patient during this visit. Patient has an advance directive in EMR which is still valid.      Objective    Vitals:    24 0850   BP: 130/80   Pulse: 63   SpO2: 98%   Weight: 111 kg (244 lb)   Height: 185.4 cm (73\")     Estimated body mass index is 32.19 kg/m² as calculated from the following:    Height as of this encounter: 185.4 cm (73\").    Weight as of this encounter: 111 kg (244 lb).           Does the patient have evidence of cognitive impairment?   No            HEALTH RISK ASSESSMENT    Smoking Status:  Social History     Tobacco Use   Smoking Status Never   Smokeless Tobacco Never     Alcohol Consumption:  Social History     Substance and Sexual Activity   Alcohol Use Not Currently    Comment: 1 drink a week     Fall Risk Screen:    CINDY Fall Risk Assessment was completed, and patient is at LOW risk for falls.Assessment completed on:2024    Depression Screenin/16/2024     8:53 AM   PHQ-2/PHQ-9 Depression Screening   Little Interest or Pleasure in Doing Things 0-->not at all   Feeling Down, Depressed " or Hopeless 0-->not at all   PHQ-9: Brief Depression Severity Measure Score 0       Health Habits and Functional and Cognitive Screenin/16/2024     8:53 AM   Functional & Cognitive Status   Do you have difficulty preparing food and eating? No   Do you have difficulty bathing yourself, getting dressed or grooming yourself? No   Do you have difficulty using the toilet? No   Do you have difficulty moving around from place to place? No   Do you have trouble with steps or getting out of a bed or a chair? No   Current Diet Well Balanced Diet   Dental Exam Up to date   Eye Exam Up to date   Exercise (times per week) 5 times per week   Current Exercises Include Other   Do you need help using the phone?  No   Are you deaf or do you have serious difficulty hearing?  No   Do you need help to go to places out of walking distance? No   Do you need help shopping? No   Do you need help preparing meals?  No   Do you need help with housework?  No   Do you need help with laundry? No   Do you need help taking your medications? No   Do you need help managing money? No   Do you ever drive or ride in a car without wearing a seat belt? No   Have you felt unusual stress, anger or loneliness in the last month? No   Who do you live with? Spouse   If you need help, do you have trouble finding someone available to you? No   Have you been bothered in the last four weeks by sexual problems? No   Do you have difficulty concentrating, remembering or making decisions? No       Age-appropriate Screening Schedule:  Refer to the list below for future screening recommendations based on patient's age, sex and/or medical conditions. Orders for these recommended tests are listed in the plan section. The patient has been provided with a written plan.    Health Maintenance   Topic Date Due    TDAP/TD VACCINES (1 - Tdap) Never done    ZOSTER VACCINE (1 of 2) Never done    RSV Vaccine - Adults (1 - 1-dose 60+ series) Never done    HEPATITIS C  SCREENING  Never done    Pneumococcal Vaccine 65+ (1 of 1 - PCV) Never done    COVID-19 Vaccine (1 - 2023-24 season) Never done    ANNUAL WELLNESS VISIT  11/07/2023    LIPID PANEL  05/23/2024    INFLUENZA VACCINE  08/01/2024    BMI FOLLOWUP  11/03/2024    COLORECTAL CANCER SCREENING  02/23/2033                  CMS Preventative Services Quick Reference  Risk Factors Identified During Encounter:    None Identified    The above risks/problems have been discussed with the patient.  Pertinent information has been shared with the patient in the After Visit Summary.    Diagnoses and all orders for this visit:    1. Routine general medical examination at a health care facility (Primary)    2. Prostate cancer screening  -     PSA Screen    3. Benign essential HTN  -     CBC Auto Differential  -     Comprehensive Metabolic Panel  -     Lipid Panel  -     TSH  -     lisinopril-hydrochlorothiazide (PRINZIDE,ZESTORETIC) 20-12.5 MG per tablet; Take 1 tablet by mouth Daily.  Dispense: 90 tablet; Refill: 3    4. Medicare annual wellness visit, subsequent    5. Degenerative disc disease, lumbar  -     celecoxib (CeleBREX) 200 MG capsule; Take 1 capsule by mouth Daily.  Dispense: 90 capsule; Refill: 3    6. Mixed hyperlipidemia    7. Gastroesophageal reflux disease, unspecified whether esophagitis present  -     famotidine (PEPCID) 40 MG tablet; Take 1 tablet by mouth Every Night.  Dispense: 90 tablet; Refill: 3    Other orders  -     ezetimibe (Zetia) 10 MG tablet; Take 1 tablet by mouth Daily.  Dispense: 90 tablet; Refill: 1    Updated annual wellness visit checklist.  Immunizations discussed.  Screening up-to-date.  Recommend yearly dental and eye exams. Also discussed monitoring of blood pressure and lipids. We addressed patient self-assessment of health status, frailty, and physical functioning. We reviewed psychosocial risks, behavioral risks, instrumental activities of daily living, and patient health risk assessment. Patient  was given a personalized prevention plan.     Follow Up:   Next Medicare Wellness visit to be scheduled in 1 year.      An After Visit Summary and PPPS were made available to the patient.

## 2024-05-17 LAB
ALBUMIN SERPL-MCNC: 4.3 G/DL (ref 3.9–4.9)
ALBUMIN/GLOB SERPL: 2 {RATIO} (ref 1.2–2.2)
ALP SERPL-CCNC: 50 IU/L (ref 44–121)
ALT SERPL-CCNC: 23 IU/L (ref 0–44)
AST SERPL-CCNC: 16 IU/L (ref 0–40)
BASOPHILS # BLD AUTO: 0.1 X10E3/UL (ref 0–0.2)
BASOPHILS NFR BLD AUTO: 1 %
BILIRUB SERPL-MCNC: 0.5 MG/DL (ref 0–1.2)
BUN SERPL-MCNC: 8 MG/DL (ref 8–27)
BUN/CREAT SERPL: 9 (ref 10–24)
CALCIUM SERPL-MCNC: 9.2 MG/DL (ref 8.6–10.2)
CHLORIDE SERPL-SCNC: 106 MMOL/L (ref 96–106)
CHOLEST SERPL-MCNC: 199 MG/DL (ref 100–199)
CO2 SERPL-SCNC: 21 MMOL/L (ref 20–29)
CREAT SERPL-MCNC: 0.93 MG/DL (ref 0.76–1.27)
EGFRCR SERPLBLD CKD-EPI 2021: 90 ML/MIN/1.73
EOSINOPHIL # BLD AUTO: 0.2 X10E3/UL (ref 0–0.4)
EOSINOPHIL NFR BLD AUTO: 3 %
ERYTHROCYTE [DISTWIDTH] IN BLOOD BY AUTOMATED COUNT: 12.9 % (ref 11.6–15.4)
GLOBULIN SER CALC-MCNC: 2.1 G/DL (ref 1.5–4.5)
GLUCOSE SERPL-MCNC: 124 MG/DL (ref 70–99)
HCT VFR BLD AUTO: 44.9 % (ref 37.5–51)
HDLC SERPL-MCNC: 34 MG/DL
HGB BLD-MCNC: 15.3 G/DL (ref 13–17.7)
IMM GRANULOCYTES # BLD AUTO: 0 X10E3/UL (ref 0–0.1)
IMM GRANULOCYTES NFR BLD AUTO: 0 %
LDLC SERPL CALC-MCNC: 139 MG/DL (ref 0–99)
LYMPHOCYTES # BLD AUTO: 1.4 X10E3/UL (ref 0.7–3.1)
LYMPHOCYTES NFR BLD AUTO: 23 %
MCH RBC QN AUTO: 31.4 PG (ref 26.6–33)
MCHC RBC AUTO-ENTMCNC: 34.1 G/DL (ref 31.5–35.7)
MCV RBC AUTO: 92 FL (ref 79–97)
MONOCYTES # BLD AUTO: 0.5 X10E3/UL (ref 0.1–0.9)
MONOCYTES NFR BLD AUTO: 8 %
NEUTROPHILS # BLD AUTO: 3.9 X10E3/UL (ref 1.4–7)
NEUTROPHILS NFR BLD AUTO: 65 %
PLATELET # BLD AUTO: 300 X10E3/UL (ref 150–450)
POTASSIUM SERPL-SCNC: 3.8 MMOL/L (ref 3.5–5.2)
PROT SERPL-MCNC: 6.4 G/DL (ref 6–8.5)
PSA SERPL-MCNC: 0.8 NG/ML (ref 0–4)
RBC # BLD AUTO: 4.88 X10E6/UL (ref 4.14–5.8)
SODIUM SERPL-SCNC: 140 MMOL/L (ref 134–144)
TRIGL SERPL-MCNC: 143 MG/DL (ref 0–149)
TSH SERPL DL<=0.005 MIU/L-ACNC: 1.2 UIU/ML (ref 0.45–4.5)
VLDLC SERPL CALC-MCNC: 26 MG/DL (ref 5–40)
WBC # BLD AUTO: 6 X10E3/UL (ref 3.4–10.8)

## 2024-06-20 ENCOUNTER — TELEPHONE (OUTPATIENT)
Dept: PAIN MEDICINE | Facility: CLINIC | Age: 69
End: 2024-06-20
Payer: COMMERCIAL

## 2024-06-20 NOTE — TELEPHONE ENCOUNTER
Provider:     Caller: KEEGAN    Relationship to Patient: SELF    Pharmacy:     Phone Number: [phone]    Reason for Call: PT CALLED TO REQ MORE EPIDURALS - PLEASE ADVISE

## 2024-07-02 ENCOUNTER — OFFICE VISIT (OUTPATIENT)
Dept: PAIN MEDICINE | Facility: CLINIC | Age: 69
End: 2024-07-02
Payer: MEDICARE

## 2024-07-02 VITALS — BODY MASS INDEX: 31.68 KG/M2 | HEIGHT: 73 IN | WEIGHT: 239 LBS

## 2024-07-02 DIAGNOSIS — M96.1 POSTLAMINECTOMY SYNDROME: Primary | ICD-10-CM

## 2024-07-02 DIAGNOSIS — M54.16 LUMBAR RADICULOPATHY: ICD-10-CM

## 2024-07-02 PROCEDURE — 1160F RVW MEDS BY RX/DR IN RCRD: CPT

## 2024-07-02 PROCEDURE — G2211 COMPLEX E/M VISIT ADD ON: HCPCS

## 2024-07-02 PROCEDURE — 1125F AMNT PAIN NOTED PAIN PRSNT: CPT

## 2024-07-02 PROCEDURE — 1159F MED LIST DOCD IN RCRD: CPT

## 2024-07-02 PROCEDURE — 99213 OFFICE O/P EST LOW 20 MIN: CPT

## 2024-07-02 RX ORDER — IBUPROFEN 600 MG/1
TABLET ORAL
COMMUNITY
Start: 2024-06-26

## 2024-07-02 RX ORDER — AMOXICILLIN 500 MG/1
CAPSULE ORAL
COMMUNITY
Start: 2024-06-26

## 2024-07-02 NOTE — PROGRESS NOTES
Referring Physician: No referring provider defined for this encounter.    Primary Physician: Rigo Mustafa MD    CHIEF COMPLAINT or REASON FOR VISIT: Back Pain (Follow up wants to talk epidural )      Initial history of present illness on 02/09/2024:  Mr. Behzad Peoples is 68 y.o. male who presents as a new patient referral for evaluation treatment of chronic axial low back pain with radiation to bilateral lower extremities and feet as well as a 6-month history of left-sided low back pain with radiation to the buttock.  Mr. Peoples has past medical history of lumbar laminectomy with Dr. Quiros several years ago.  At that time he had been experiencing significant problems with motor dysfunction and difficulty ambulating.  The symptoms resolved with surgery however he continued to have low back pain and bilateral foot numbness and tingling especially with prolonged standing ambulation.  On the last 6 months he has noticed worsening of his left-sided low back pain again with ambulation and prolonged standing.  He sought consultation with Dr. Workman, neurosurgery, who identified a very low-grade L4-5 listhesis which appears to be dynamic on x-ray.  Patient and wife would very much like to avoid lumbar fusion or further surgery.  Patient denies any bowel or bladder dysfunction, lower extremity weakness, new onset saddle anesthesia or unexplained weight loss.   He did have epidural steroid injections prior to his last surgery with variable effect.    Interval history: Patient returns to clinic today.  He has previous underwent a left-sided transforaminal epidural steroid injections with excellent relief for approximately 4 months.  He noticed a return of his symptoms a couple weeks ago.  Today, patient presents with chronic axial low back pain with radiation to the  left buttock.  He is pleased with the results of this injection and is interested in repeat injections.  Unfortunately, his father is in hospice care  at this time.     Patient also reports if repeat injections are ineffective he would like to pursue a spinal cord stimulator trial.  Patient has already obtain psychiatric clearance from [facility] for implanted device.  Patient states he has not been placed to undergo any advanced procedure or spinal cord stimulator at this time due to taking care of his father.    Interventions:  2/15/2024: Left L3/4 and L4/5 TFESI with 100% relief for 4 months    Objective Pain Scoring:   BRIEF PAIN INVENTORY:  Total score:   Pain Score    07/02/24 1045   PainSc: 5  Comment: when standing long periods/ walking   PainLoc: Back        PHQ-2: PHQ-2 Total Score: 0  PHQ-9: PHQ-9: Brief Depression Severity Measure Score: 0  Opioid Risk Tool:         Review of Systems:   ROS negative except as otherwise noted     Past Medical History:   Past Medical History:   Diagnosis Date    Allergic     Arthritis     Bronchitis     Bulging of cervical intervertebral disc 06/21/2018    GERD (gastroesophageal reflux disease)     History of SCC (squamous cell carcinoma) of skin     Hyperlipidemia     Hypertension     Low back pain 1988    Melanoma     Melanoma     scalp    Osteoarthritis 2006    Spinal stenosis 2006    Arthritis and stenosis throughout spine    Wears glasses          Past Surgical History:   Past Surgical History:   Procedure Laterality Date    BACK SURGERY  3/2021    Laminectomy x2    COLONOSCOPY  2014    HERNIA REPAIR  8/2021    LUMBAR LAMINECTOMY DISCECTOMY DECOMPRESSION Bilateral 03/19/2021    Procedure: LUMBAR DECOMPRESSION L3, L4;  Surgeon: Saran Quiros MD;  Location: [facility];  Service: Neurosurgery;  Laterality: Bilateral;    SKIN CANCER EXCISION      SPINE SURGERY  3/2021    Laminectomy x 2         Family History   Family History   Problem Relation Age of Onset    Arthritis Father     Diabetes Father     Hearing loss Father     Hyperlipidemia Father     Thyroid disease Father          Social History  "  Social History     Socioeconomic History    Marital status:    Tobacco Use    Smoking status: Never    Smokeless tobacco: Never   Vaping Use    Vaping status: Never Used   Substance and Sexual Activity    Alcohol use: Not Currently     Comment: 1 drink a week    Drug use: No    Sexual activity: Defer        Medications:     Current Outpatient Medications:     amoxicillin (AMOXIL) 500 MG capsule, , Disp: , Rfl:     ascorbic acid (VITAMIN C) 1000 MG tablet, Take 1 tablet by mouth 2 (two) times a day., Disp: , Rfl:     celecoxib (CeleBREX) 200 MG capsule, Take 1 capsule by mouth Daily., Disp: 90 capsule, Rfl: 3    colchicine 0.6 MG tablet, 2 tab po once then 1 pill po 1 hour later - may also take as 1 tablet daily for prevention, Disp: 15 tablet, Rfl: 1    ezetimibe (Zetia) 10 MG tablet, Take 1 tablet by mouth Daily., Disp: 90 tablet, Rfl: 1    famotidine (PEPCID) 40 MG tablet, Take 1 tablet by mouth Every Night., Disp: 90 tablet, Rfl: 3    ibuprofen (ADVIL,MOTRIN) 600 MG tablet, , Disp: , Rfl:     lisinopril-hydrochlorothiazide (PRINZIDE,ZESTORETIC) 20-12.5 MG per tablet, Take 1 tablet by mouth Daily., Disp: 90 tablet, Rfl: 3    multivitamin with minerals tablet tablet, Take 1 tablet by mouth Daily., Disp: , Rfl:         Physical Exam:     Vitals:    07/02/24 1045   Weight: 108 kg (239 lb)   Height: 185.4 cm (72.99\")   PainSc: 5  Comment: when standing long periods/ walking   PainLoc: Back        General: Alert and oriented, No acute distress.   HEENT: Normocephalic, atraumatic.   Cardiovascular: No gross edema  Respiratory: Respirations are non-labored    Lumbar Spine:   No masses or atrophy  Range of motion - Flexion normal. Extension normal.    Facet Loading: Negative bilaterally  Facet Palpation - Nontender   PSIS tenderness - Negative bilaterally  Derek's/KATIE/Thigh thrust -   Straight leg raise/slump test: Negative bilaterally      Motor Exam:        Strength: Rate on 1-5 scale Right Left    L1/2- " hip flexion 5/5  5/5    L3- knee extension 5/5  5/5    L4- ankle dorsiflexion 5/5  5/5    L5- great toe extension 5/5  5/5    S1- ankle plantarflexion 5/5  5/5    Sensory Exam: Full and equal sensation to light touch throughout.  Neurologic: Cranial Nerves II-XII are grossly intact.   Psychiatric: Cooperative.   Gait: Antalgic flexed forward  Assistive Devices: None      Imaging Studies:   Results for orders placed during the hospital encounter of 10/06/23    MRI Lumbar Spine Without Contrast    Narrative  MRI LUMBAR SPINE WO CONTRAST    Date of Exam: 10/6/2023 2:23 PM EDT    Indication: low back pain.    Comparison: Radiographs 8/21/2023.    Technique:  Routine multiplanar/multisequence sequence images of the lumbar spine were obtained without contrast administration.    Findings:  There is evidence of prior posterior decompression at L3-4 and L4-5. T1 marrow signal is preserved, without evidence of fracture or suspicious marrow replacing lesion. There is mild grade 1 anterolisthesis of L3 on L4 and L4 on L5. The conus medullaris  and cauda equina nerve roots are satisfactory in appearance. The paraspinal soft tissues demonstrate no acute or suspicious findings. Multilevel spondylosis is present, with areas of involvement including    L1-2, no significant spinal canal or neuroforaminal impingement.    L2-3, small disc bulge and bilateral facet arthropathy with some ligamentum flavum thickening. There is moderate spinal canal narrowing and moderate to severe bilateral neuroforaminal stenosis, greater on the left.    L3-4, disc bulge is present, eccentric to the left. The spinal canal is patent following operative decompression. There is severe left and mild right neuroforaminal narrowing.    L4-5, postoperative changes with circumferential disc bulge and bilateral facet arthropathy present. The spinal canal remains decompressed. There is moderate to severe left and mild right neuroforaminal narrowing.    L5-S1,  small disc bulge and bilateral facet arthropathy. The spinal canal is patent. There is moderate bilateral neuroforaminal narrowing, slightly worse on the left.    Impression  Impression:  Patent spinal canal at the previously decompressed L3-4 and L4-5 levels. These levels demonstrate some listhesis and areas of disc bulge with resultant severe narrowing at the left neural foramen at each level. There is also some moderate spinal canal  narrowing and moderate to severe bilateral neuroforaminal stenosis at the adjacent L2-3 level.    Electronically Signed: Thanh Field MD  10/6/2023 4:28 PM EDT  Workstation ID: [id number]      Results for orders placed during the hospital encounter of 12/07/20    MRI Lumbar Spine Without Contrast    Narrative  EXAMINATION: MRI LUMBAR SPINE WO CONTRAST-    INDICATION: Back and leg pain after fall; M43.16-Spondylolisthesis,  lumbar region; M48.062-Spinal stenosis, lumbar region with neurogenic  claudication; right leg pain and weakness.    TECHNIQUE: Routine multiplanar imaging was obtained of the lumbar spine  without the administration of gadolinium contrast.    COMPARISON: None.    FINDINGS: There is disc desiccation seen at the L2-L3, L3-L4, L4-L5 and  L5-S1 levels. Mild anterior spurring and posterior osteophyte formation  identified. There is mild anterolisthesis of L3 on L4. Normal signal  intensity within the conus. The spinal cord terminates at the L1 level.  The facets are well aligned. No abnormal mass or fluid collection is  seen within the paraspinal muscles.    Axial imaging reveals at the L1-L2 level with no significant central  spinal canal stenosis. No neural foraminal narrowing or nerve root  compromise.    At the L2-L3 level, there is a broad-based disc bulge with some  lateralization to the left. There is moderate narrowing of the left  neuroforamina. Mild to moderate central spinal canal stenosis. Nerve  root contact on the left cannot be excluded.    At the L3-L4  level, there is a broad-based disc bulge with  lateralization to the left. Severe narrowing of the left neuroforamina.  Moderate to severe central spinal canal stenosis with degenerative  changes seen in the posterior facets and thickening of the posterior  ligamentum flavum.    At the L4-L5 level, there is a broad-based disc bulge identified with  some lateralization to the right creating moderate to severe right  neuroforaminal narrowing and possible nerve root contact on the right.  Moderate to severe central spinal canal stenosis.    At the L5-S1 level, there is a broad-based disc bulge creating narrowing  of the neuroforamina bilaterally right greater than left. Nerve root  contact on the right cannot be excluded.    Impression  Multilevel degenerative changes identified diffusely  throughout the lumbar spine from L2 through S1. There is neural  foraminal narrowing on the left at the L2-L3 and L3-L4 level and neural  foraminal narrowing on the right at the L4-L5 and L5-S1 level. There is  moderate to severe central spinal canal stenosis most pronounced at the  L3-L4 level in which nerve root contact and compromise cannot be  excluded. Clinical correlation is needed.    D:  12/07/2020  E:  12/07/2020    This report was finalized on 12/7/2020 9:01 PM by Dr. Vale Hair MD.      Impression & Plan:       02/09/2024: Behzad Peoples is a 68 y.o. male with past medical history significant for GERD, HLD, HTN, L3, L4 laminectomy in 2021 with Dr. Quiros who presents to the pain clinic for evaluation and treatment of persistent low back pain with radiation to bilateral lower extremities.  I personally reviewed and interpreted his lumbar MRI demonstrating: L5/S1 DDD; L3/L4 grade 1 anterolisthesis; L4/L5 grade 1 anterolisthesis; left L3/L4 NFS; L3/4 and L4/5 facet spondylosis; multifidus atrophy.  Examination consistent with lumbar radiculopathy, postlaminectomy syndrome.  We discussed epidural steroid injection to  improve pain.  If greater than 50% relief for at least 2-3 months can consider repeat as needed every 3 to 4 months.  I had a discussion with the patient regarding the risks of the procedure including bleeding, infection, damage to surrounding structures.  We discussed the potential adverse effects of corticosteroid injection including flushing of the face, lipodystrophy, skin discoloration, elevated blood glucose, increased blood pressure.  Risks of frequent steroid administration include weight gain, hormonal changes, mood changes, osteoporosis.  Additionally discussed spinal cord stimulator trial.  Patient would like to pursue trial, and he first needs to find elder care for his father.    2024: Excellent relief from TFESI.  Can repeat if needed.  If minimal or transient benefit we will proceed with SCS trial  24: Excellent relief from TFESI.  Will plan for repeat.  Will plan for SCS trial if minimal or transient benefit, or whenever patient is in a place to undergo this procedure.    1. Postlaminectomy syndrome    2. Lumbar radiculopathy            PLAN:  1. Medication Recommendations: Recommend Voltaren topical, NSAIDs, Tylenol.  Can trial turmeric 500 mg twice daily if NSAID contraindicated.    2. Physical Therapy: Continue HEP    3. Psychological: Psychiatric clearance has already been obtained at last office visit    4. Complementary and alternative (CAM) Therapies:     5. Labs/Diagnostic studies: None indicated     6. Imagin. Interventions: Schedule repeat left L3/L4 and L4/L5 transforaminal epidural steroid injection (77716, [id number]).  If minimal or transient benefit from repeat injection we will pursue SCS trial with [employer].     8. Referrals:     9. Records:     10. Lifestyle goals:    Follow-up 3-4 months in [facility]  Nika Mancia PA-C

## 2024-07-16 ENCOUNTER — OUTSIDE FACILITY SERVICE (OUTPATIENT)
Dept: PAIN MEDICINE | Facility: CLINIC | Age: 69
End: 2024-07-16
Payer: COMMERCIAL

## 2024-07-16 ENCOUNTER — DOCUMENTATION (OUTPATIENT)
Dept: PAIN MEDICINE | Facility: CLINIC | Age: 69
End: 2024-07-16

## 2024-07-16 PROCEDURE — 64483 NJX AA&/STRD TFRM EPI L/S 1: CPT | Performed by: STUDENT IN AN ORGANIZED HEALTH CARE EDUCATION/TRAINING PROGRAM

## 2024-07-16 PROCEDURE — 64484 NJX AA&/STRD TFRM EPI L/S EA: CPT | Performed by: STUDENT IN AN ORGANIZED HEALTH CARE EDUCATION/TRAINING PROGRAM

## 2024-07-16 NOTE — PROGRESS NOTES
[facility]  [address] [place] [address]      PROCEDURE: Fluoroscopically-guided  Lumbar Transforaminal Epidural Steroid Injection - LEFT L3/4 and L4/5    PRE-OP DIAGNOSIS: Lumbar radiculopathy  POST-OP DIAGNOSIS: Lumbar radiculopathy    BLOOD THINNERS (ANTIPLATELETS/ANTICOAGULANTS): Were discussed with the patient and JEAN PIERRE Guidelines were followed.     CONSENT: Risks, benefits and options were explained to the patient, all questions were answered and written informed consent was obtained.     ANESTHESIA: Local Only      PROCEDURE NOTE: A pre-procedural time out was performed to confirm the correct patient, procedure, side, and site. Standard ASA monitors were applied and oxygen via nasal cannula was provided. All proceduralists donned sterile gloves with masks and surgical hats. The patient was placed prone with pillow under the abdomen and all pressure points padded. The patient's lumbar spine was prepped in standard fashion using Chlorhexidine and draped with sterile towels. The target neuroforamen was identified using oblique fluoroscopy and the superior vertebral body endplate squared. The overlying skin and subcutaneous tissue was anesthetized with 1% lidocaine. A 22 gauge 3.5 inch spinal needle with bent tip was incrementally advanced using intermittent fluoroscopy to the 6 o'clock position of the target pedicle in the mid-neuroforamen using oblique, AP and lateral intermittent fluoroscopy. After negative aspiration of blood and cerebrospinal fluid, needle placement was confirmed with 1 ml of omnipaque 180 mgI/ml contrast using AP fluoroscopic imaging. Imaging revealed a clear outline of the target spinal nerve with proximal spread of agent through the neuroforamen medially to the epidural space, without evidence of intravascular or intrathecal spread. After negative aspiration, a mixture containing dexamethasone 5 mg steroid and lidocaine 1% - 1 ml local  anesthetic for a total volume of 1.5 ml was injected under direct visualization with fluoroscopy. The needle was flushed, removed and a bandage applied.  The same procedure utilizing the same technique was performed at each target level listed above.    EBL: None     COMPLICATIONS: None     The patient was monitored in recovery area until all discharge criteria were met. Vital signs remained stable throughout the procedure and in the recovery area. There were no immediate complications and the patient tolerated the procedure well. Sensory and motor exam was unchanged from baseline. The patient received written discharge instructions prior to discharge.     FOLLOW UP: As scheduled     ADDITIONAL NOTES: []        [facility]       Dami Ricardo MD     CODES:  [id number]  [id number]

## 2024-08-16 ENCOUNTER — OFFICE VISIT (OUTPATIENT)
Dept: FAMILY MEDICINE CLINIC | Facility: CLINIC | Age: 69
End: 2024-08-16
Payer: MEDICARE

## 2024-08-16 VITALS
SYSTOLIC BLOOD PRESSURE: 124 MMHG | HEART RATE: 59 BPM | WEIGHT: 240 LBS | DIASTOLIC BLOOD PRESSURE: 80 MMHG | BODY MASS INDEX: 32.51 KG/M2 | HEIGHT: 72 IN | OXYGEN SATURATION: 98 %

## 2024-08-16 DIAGNOSIS — M25.50 ARTHRALGIA, UNSPECIFIED JOINT: ICD-10-CM

## 2024-08-16 DIAGNOSIS — E78.2 MIXED HYPERLIPIDEMIA: Primary | ICD-10-CM

## 2024-08-16 PROCEDURE — 1125F AMNT PAIN NOTED PAIN PRSNT: CPT | Performed by: FAMILY MEDICINE

## 2024-08-16 PROCEDURE — 3074F SYST BP LT 130 MM HG: CPT | Performed by: FAMILY MEDICINE

## 2024-08-16 PROCEDURE — 3079F DIAST BP 80-89 MM HG: CPT | Performed by: FAMILY MEDICINE

## 2024-08-16 PROCEDURE — 99214 OFFICE O/P EST MOD 30 MIN: CPT | Performed by: FAMILY MEDICINE

## 2024-08-16 RX ORDER — DICLOFENAC SODIUM AND MISOPROSTOL 75; 200 MG/1; UG/1
1 TABLET, DELAYED RELEASE ORAL 2 TIMES DAILY
Qty: 60 TABLET | Refills: 5 | Status: SHIPPED | OUTPATIENT
Start: 2024-08-16

## 2024-08-16 NOTE — PROGRESS NOTES
Follow Up Office Visit      Date of Visit:  2024   Patient Name: Behzad Peoples  : 1955   MRN: [id number]     Chief Complaint:    Chief Complaint   Patient presents with    Hyperlipidemia       History of Present Illness: Behzad Peoples is a 68 y.o. male who is here today for follow up.    History of Present Illness  The patient is a 68-year-old gentleman here to follow up on recent blood work related to his cholesterol.    He has been on Zetia for the past 90 days and is interested in understanding its potential side effects. He reports no joint pain associated with Zetia use.    He experiences arthritis pain, which he attributes to aging rather than Zetia. His physical activity has been limited since 2023. He has tried various medications including Celebrex, nabumetone, ibuprofen 800 mg, meloxicam, and diclofenac, but none have provided relief. He discontinued diclofenac due to adverse reactions and is considering trying Arthrotec.    Three weeks ago, he sprained his knee while entering a tub, resulting in yellowish-green bruising and instability. He has a history of meniscus issues in his knee and currently feels general weakness. He also experiences pain in his hip and hands and has widespread arthritis, including in his spine.    A year ago, he had a gout attack in his big toe after consuming red meat but has not had another attack since. He wonders if red meat could be causing an inflammatory reaction in his joints, as his arthritis seems to be worsening.    He continues to experience numbness in his feet and lower back, which he believes is due to failed back surgeries. He has received several rounds of corticosteroid injections in his lumbar area, with varying degrees of success. He underwent a laminectomy and had a herniated disc that was pressing on nerves. He occasionally loses balance and uses a walking stick for support.    FAMILY HISTORY  He has a family history  of arthritis.      Subjective      Review of Systems:   Review of Systems   Constitutional:  Negative for fatigue and fever.   HENT:  Negative for congestion and ear pain.    Respiratory:  Negative for apnea, cough, chest tightness and shortness of breath.    Cardiovascular:  Negative for chest pain.   Gastrointestinal:  Negative for abdominal pain, constipation, diarrhea and nausea.   Musculoskeletal:  Negative for arthralgias.   Psychiatric/Behavioral:  Negative for depressed mood and stress.        Past Medical History:   Past Medical History:   Diagnosis Date    Allergic     Arthritis     Bronchitis     Bulging of cervical intervertebral disc 06/21/2018    GERD (gastroesophageal reflux disease)     History of SCC (squamous cell carcinoma) of skin     Hyperlipidemia     Hypertension     Low back pain 1988    Melanoma     Melanoma     scalp    Osteoarthritis 2006    Spinal stenosis 2006    Arthritis and stenosis throughout spine    Wears glasses        Past Surgical History:   Past Surgical History:   Procedure Laterality Date    BACK SURGERY  3/2021    Laminectomy x2    COLONOSCOPY  2014    HERNIA REPAIR  8/2021    LUMBAR LAMINECTOMY DISCECTOMY DECOMPRESSION Bilateral 03/19/2021    Procedure: LUMBAR DECOMPRESSION L3, L4;  Surgeon: Saran Quiros MD;  Location: [facility];  Service: Neurosurgery;  Laterality: Bilateral;    SKIN CANCER EXCISION      SPINE SURGERY  3/2021    Laminectomy x 2       Family History:   Family History   Problem Relation Age of Onset    Arthritis Father     Diabetes Father     Hearing loss Father     Hyperlipidemia Father     Thyroid disease Father        Social History:   Social History     Socioeconomic History    Marital status:    Tobacco Use    Smoking status: Never    Smokeless tobacco: Never   Vaping Use    Vaping status: Never Used   Substance and Sexual Activity    Alcohol use: Not Currently     Comment: 1 drink a week    Drug use: No    Sexual activity: Defer  "      Medications:     Current Outpatient Medications:     amoxicillin (AMOXIL) 500 MG capsule, , Disp: , Rfl:     ascorbic acid (VITAMIN C) 1000 MG tablet, Take 1 tablet by mouth 2 (two) times a day., Disp: , Rfl:     celecoxib (CeleBREX) 200 MG capsule, Take 1 capsule by mouth Daily., Disp: 90 capsule, Rfl: 3    colchicine 0.6 MG tablet, 2 tab po once then 1 pill po 1 hour later - may also take as 1 tablet daily for prevention, Disp: 15 tablet, Rfl: 1    ezetimibe (Zetia) 10 MG tablet, Take 1 tablet by mouth Daily., Disp: 90 tablet, Rfl: 1    famotidine (PEPCID) 40 MG tablet, Take 1 tablet by mouth Every Night., Disp: 90 tablet, Rfl: 3    ibuprofen (ADVIL,MOTRIN) 600 MG tablet, , Disp: , Rfl:     lisinopril-hydrochlorothiazide (PRINZIDE,ZESTORETIC) 20-12.5 MG per tablet, Take 1 tablet by mouth Daily., Disp: 90 tablet, Rfl: 3    multivitamin with minerals tablet tablet, Take 1 tablet by mouth Daily., Disp: , Rfl:     Allergies:   Allergies   Allergen Reactions    Bee Venom Anaphylaxis     Takes shots to prevent    Omeprazole Rash    Protonix [Pantoprazole] Rash     Any of the omeprazole drugs    Sulfa Antibiotics Rash     Pt can't remember reaction       Objective     Physical Exam:  Vital Signs:   Vitals:    08/16/24 0915   BP: 124/80   Pulse: 59   SpO2: 98%   Weight: 109 kg (240 lb)   Height: 182.9 cm (72\")     Body mass index is 32.55 kg/m².     Physical Exam  Vitals and nursing note reviewed.   Constitutional:       General: He is not in acute distress.     Appearance: Normal appearance. He is not ill-appearing.   HENT:      Head: Normocephalic and atraumatic.      Right Ear: Tympanic membrane and ear canal normal.      Left Ear: Tympanic membrane and ear canal normal.      Nose: Nose normal.   Cardiovascular:      Rate and Rhythm: Normal rate and regular rhythm.      Heart sounds: Normal heart sounds.   Pulmonary:      Effort: Pulmonary effort is normal.      Breath sounds: Normal breath sounds. "   Neurological:      Mental Status: He is alert and oriented to person, place, and time. Mental status is at baseline.   Psychiatric:         Mood and Affect: Mood normal.       Physical Exam      Procedures    Results  Laboratory Studies  LDL cholesterol decreased from 152 to 139. Triglycerides decreased from 177 to 143.  Assessment / Plan      Assessment/Plan:   There are no diagnoses linked to this encounter.   Assessment & Plan  1. Hypercholesterolemia.  There was a decrease in his LDL from 152 to 139 and triglycerides from 177 to 143 between May 2023 and May 2024. A comprehensive metabolic panel will be ordered to monitor liver function due to the ongoing cholesterol medication.  Will recheck.    2. Arthritis.  The knee pain could be attributed to a minor meniscus or ligament injury, with the latter being more probable. The possibility of gouty arthritis or rheumatologic autoimmune arthritis is also considered. Blood work will be conducted to rule out conditions beyond osteoarthritis. Uric acid levels will be checked to determine if they are above or below 6, which would indicate gout. Autoimmune tests and inflammatory markers will be assessed for potential autoimmune arthritis. Arthrotec 75 mg twice daily with meals has been prescribed. He has been advised to use a wraparound brace for additional support when working outside or on uneven terrain. If there is no improvement in symptoms despite anti-inflammatory medication and time, imaging studies will be considered.          Follow Up:   No follow-ups on file.    Rigo Mustafa  [facility]     Patient or patient representative verbalized consent for the use of Ambient Listening during the visit with  Rigo Mustafa MD for chart documentation. 8/16/2024  11:38 EDT

## 2024-08-19 LAB
ALBUMIN SERPL-MCNC: 4.2 G/DL (ref 3.9–4.9)
ALP SERPL-CCNC: 64 IU/L (ref 44–121)
ALT SERPL-CCNC: 24 IU/L (ref 0–44)
ANA SER QL: NEGATIVE
AST SERPL-CCNC: 18 IU/L (ref 0–40)
BILIRUB SERPL-MCNC: 0.6 MG/DL (ref 0–1.2)
BUN SERPL-MCNC: 10 MG/DL (ref 8–27)
BUN/CREAT SERPL: 10 (ref 10–24)
CALCIUM SERPL-MCNC: 9.6 MG/DL (ref 8.6–10.2)
CCP IGA+IGG SERPL IA-ACNC: 4 UNITS (ref 0–19)
CHLORIDE SERPL-SCNC: 103 MMOL/L (ref 96–106)
CHOLEST SERPL-MCNC: 196 MG/DL (ref 100–199)
CO2 SERPL-SCNC: 22 MMOL/L (ref 20–29)
CREAT SERPL-MCNC: 0.99 MG/DL (ref 0.76–1.27)
CRP SERPL-MCNC: 2 MG/L (ref 0–10)
EGFRCR SERPLBLD CKD-EPI 2021: 83 ML/MIN/1.73
ERYTHROCYTE [SEDIMENTATION RATE] IN BLOOD BY WESTERGREN METHOD: 3 MM/HR (ref 0–30)
GLOBULIN SER CALC-MCNC: 2.5 G/DL (ref 1.5–4.5)
GLUCOSE SERPL-MCNC: 104 MG/DL (ref 70–99)
HDLC SERPL-MCNC: 37 MG/DL
LDLC SERPL CALC-MCNC: 131 MG/DL (ref 0–99)
POTASSIUM SERPL-SCNC: 4.1 MMOL/L (ref 3.5–5.2)
PROT SERPL-MCNC: 6.7 G/DL (ref 6–8.5)
SODIUM SERPL-SCNC: 139 MMOL/L (ref 134–144)
TRIGL SERPL-MCNC: 154 MG/DL (ref 0–149)
URATE SERPL-MCNC: 6.9 MG/DL (ref 3.8–8.4)
VLDLC SERPL CALC-MCNC: 28 MG/DL (ref 5–40)

## 2024-08-31 ENCOUNTER — PATIENT MESSAGE (OUTPATIENT)
Dept: FAMILY MEDICINE CLINIC | Facility: CLINIC | Age: 69
End: 2024-08-31
Payer: COMMERCIAL

## 2024-09-08 NOTE — TELEPHONE ENCOUNTER
From: Behzad Peoples  To: Rigo Mustafa  Sent: 8/31/2024 7:59 PM EDT  Subject: Arthritis medication update     I tried the Arthrotec for 12 days. It was causing stomach and gastrointestinal issues with each dose. It appears I’m going to have to go back on Celebrex. Would two doses of Celebrex (200 mg) per day be an option for me?  Thanks, Kevin Peoples

## 2024-09-15 ENCOUNTER — HOSPITAL ENCOUNTER (EMERGENCY)
Facility: HOSPITAL | Age: 69
Discharge: HOME | End: 2024-09-15
Attending: EMERGENCY MEDICINE | Admitting: EMERGENCY MEDICINE
Payer: MEDICARE

## 2024-09-15 ENCOUNTER — APPOINTMENT (OUTPATIENT)
Dept: CARDIOLOGY | Facility: HOSPITAL | Age: 69
End: 2024-09-15
Payer: MEDICARE

## 2024-09-15 VITALS
BODY MASS INDEX: 31.81 KG/M2 | OXYGEN SATURATION: 96 % | DIASTOLIC BLOOD PRESSURE: 82 MMHG | WEIGHT: 240 LBS | SYSTOLIC BLOOD PRESSURE: 122 MMHG | RESPIRATION RATE: 16 BRPM | TEMPERATURE: 98.2 F | HEART RATE: 63 BPM | HEIGHT: 73 IN

## 2024-09-15 DIAGNOSIS — I82.402 LEG DVT (DEEP VENOUS THROMBOEMBOLISM), ACUTE, LEFT: Primary | ICD-10-CM

## 2024-09-15 LAB
BUN BLDA-MCNC: 11 MG/DL (ref 8–26)
CA-I BLDA-SCNC: 1.16 MMOL/L (ref 1.2–1.32)
CHLORIDE BLDA-SCNC: 105 MMOL/L (ref 98–109)
CO2 BLDA-SCNC: 23 MMOL/L (ref 24–29)
CREAT BLDA-MCNC: 1.1 MG/DL (ref 0.6–1.3)
EGFRCR SERPLBLD CKD-EPI 2021: 73.6 ML/MIN/1.73
GLUCOSE BLDC GLUCOMTR-MCNC: 107 MG/DL (ref 70–130)
HCT VFR BLDA CALC: 45 % (ref 38–51)
HGB BLDA-MCNC: 15.3 G/DL (ref 12–17)
POTASSIUM BLDA-SCNC: 3.8 MMOL/L (ref 3.5–4.9)
SODIUM BLD-SCNC: 139 MMOL/L (ref 138–146)

## 2024-09-15 PROCEDURE — 99284 EMERGENCY DEPT VISIT MOD MDM: CPT

## 2024-09-15 PROCEDURE — 93971 EXTREMITY STUDY: CPT | Performed by: INTERNAL MEDICINE

## 2024-09-15 PROCEDURE — 80047 BASIC METABLC PNL IONIZED CA: CPT | Performed by: EMERGENCY MEDICINE

## 2024-09-15 PROCEDURE — 93971 EXTREMITY STUDY: CPT

## 2024-09-15 PROCEDURE — 85014 HEMATOCRIT: CPT | Performed by: EMERGENCY MEDICINE

## 2024-09-15 RX ADMIN — RIVAROXABAN 15 MG: 15 TABLET, FILM COATED ORAL at 15:26

## 2024-09-16 ENCOUNTER — TELEPHONE (OUTPATIENT)
Dept: CASE MANAGEMENT | Facility: OTHER | Age: 69
End: 2024-09-16
Payer: COMMERCIAL

## 2024-09-16 LAB
BH CV LOW VAS LEFT DISTAL FEMORAL SPONT: 1
BH CV LOW VAS LEFT GASTRONEMIUS VESSEL: 1
BH CV LOW VAS LEFT MID FEMORAL SPONT: 1
BH CV LOW VAS LEFT PERONEAL VESSEL: 1
BH CV LOW VAS LEFT POPLITEAL SPONT: 1
BH CV LOW VAS LEFT POSTERIOR TIBIAL VESSEL: 1
BH CV LOW VAS LEFT PROXIMAL FEMORAL SPONT: 1
BH CV LOW VAS LEFT SOLEAL VESSEL: 1
BH CV LOWER VASCULAR LEFT COMMON FEMORAL AUGMENT: NORMAL
BH CV LOWER VASCULAR LEFT COMMON FEMORAL COMPRESS: NORMAL
BH CV LOWER VASCULAR LEFT COMMON FEMORAL PHASIC: NORMAL
BH CV LOWER VASCULAR LEFT COMMON FEMORAL SPONT: NORMAL
BH CV LOWER VASCULAR LEFT DISTAL FEMORAL AUGMENT: NORMAL
BH CV LOWER VASCULAR LEFT DISTAL FEMORAL COMPETENT: NORMAL
BH CV LOWER VASCULAR LEFT DISTAL FEMORAL COMPRESS: NORMAL
BH CV LOWER VASCULAR LEFT DISTAL FEMORAL PHASIC: NORMAL
BH CV LOWER VASCULAR LEFT DISTAL FEMORAL SPONT: NORMAL
BH CV LOWER VASCULAR LEFT GASTRONEMIUS COMPRESS: NORMAL
BH CV LOWER VASCULAR LEFT GREATER SAPH AK COMPRESS: NORMAL
BH CV LOWER VASCULAR LEFT GREATER SAPH BK COMPRESS: NORMAL
BH CV LOWER VASCULAR LEFT LESSER SAPH COMPRESS: NORMAL
BH CV LOWER VASCULAR LEFT MID FEMORAL AUGMENT: NORMAL
BH CV LOWER VASCULAR LEFT MID FEMORAL COMPETENT: NORMAL
BH CV LOWER VASCULAR LEFT MID FEMORAL COMPRESS: NORMAL
BH CV LOWER VASCULAR LEFT MID FEMORAL PHASIC: NORMAL
BH CV LOWER VASCULAR LEFT MID FEMORAL SPONT: NORMAL
BH CV LOWER VASCULAR LEFT PERONEAL COMPRESS: NORMAL
BH CV LOWER VASCULAR LEFT POPLITEAL AUGMENT: NORMAL
BH CV LOWER VASCULAR LEFT POPLITEAL COMPETENT: NORMAL
BH CV LOWER VASCULAR LEFT POPLITEAL COMPRESS: NORMAL
BH CV LOWER VASCULAR LEFT POPLITEAL PHASIC: NORMAL
BH CV LOWER VASCULAR LEFT POPLITEAL SPONT: NORMAL
BH CV LOWER VASCULAR LEFT POSTERIOR TIBIAL COMPRESS: NORMAL
BH CV LOWER VASCULAR LEFT PROXIMAL FEMORAL AUGMENT: NORMAL
BH CV LOWER VASCULAR LEFT PROXIMAL FEMORAL COMPETENT: NORMAL
BH CV LOWER VASCULAR LEFT PROXIMAL FEMORAL COMPRESS: NORMAL
BH CV LOWER VASCULAR LEFT PROXIMAL FEMORAL PHASIC: NORMAL
BH CV LOWER VASCULAR LEFT PROXIMAL FEMORAL SPONT: NORMAL
BH CV LOWER VASCULAR LEFT SAPHENOFEMORAL JUNCTION COMPRESS: NORMAL
BH CV LOWER VASCULAR LEFT SOLEAL COMPRESS: NORMAL
BH CV LOWER VASCULAR RIGHT COMMON FEMORAL AUGMENT: NORMAL
BH CV LOWER VASCULAR RIGHT COMMON FEMORAL COMPRESS: NORMAL
BH CV LOWER VASCULAR RIGHT COMMON FEMORAL PHASIC: NORMAL
BH CV LOWER VASCULAR RIGHT COMMON FEMORAL SPONT: NORMAL
BH CV VAS PRELIMINARY FINDINGS SCRIPTING: 1

## 2024-09-23 ENCOUNTER — OFFICE VISIT (OUTPATIENT)
Dept: FAMILY MEDICINE CLINIC | Facility: CLINIC | Age: 69
End: 2024-09-23
Payer: MEDICARE

## 2024-09-23 VITALS
OXYGEN SATURATION: 98 % | HEART RATE: 66 BPM | BODY MASS INDEX: 32.6 KG/M2 | WEIGHT: 246 LBS | HEIGHT: 73 IN | SYSTOLIC BLOOD PRESSURE: 130 MMHG | DIASTOLIC BLOOD PRESSURE: 80 MMHG

## 2024-09-23 DIAGNOSIS — M25.562 CHRONIC PAIN OF LEFT KNEE: ICD-10-CM

## 2024-09-23 DIAGNOSIS — G89.29 CHRONIC PAIN OF LEFT KNEE: ICD-10-CM

## 2024-09-23 DIAGNOSIS — I82.4Y2 ACUTE DEEP VEIN THROMBOSIS (DVT) OF PROXIMAL VEIN OF LEFT LOWER EXTREMITY: Primary | ICD-10-CM

## 2024-09-23 PROCEDURE — 3075F SYST BP GE 130 - 139MM HG: CPT | Performed by: FAMILY MEDICINE

## 2024-09-23 PROCEDURE — 99214 OFFICE O/P EST MOD 30 MIN: CPT | Performed by: FAMILY MEDICINE

## 2024-09-23 PROCEDURE — 1125F AMNT PAIN NOTED PAIN PRSNT: CPT | Performed by: FAMILY MEDICINE

## 2024-09-23 PROCEDURE — 3079F DIAST BP 80-89 MM HG: CPT | Performed by: FAMILY MEDICINE

## 2024-10-18 ENCOUNTER — OFFICE VISIT (OUTPATIENT)
Dept: PAIN MEDICINE | Facility: CLINIC | Age: 69
End: 2024-10-18
Payer: MEDICARE

## 2024-10-18 VITALS — WEIGHT: 246 LBS | BODY MASS INDEX: 32.6 KG/M2 | HEIGHT: 73 IN

## 2024-10-18 DIAGNOSIS — M96.1 POSTLAMINECTOMY SYNDROME: Primary | ICD-10-CM

## 2024-10-18 DIAGNOSIS — M54.16 LUMBAR RADICULOPATHY: ICD-10-CM

## 2024-10-18 RX ORDER — CELECOXIB 200 MG/1
200 CAPSULE ORAL DAILY
COMMUNITY

## 2024-10-18 NOTE — PROGRESS NOTES
Referring Physician: No referring provider defined for this encounter.    Primary Physician: Rigo Mustafa MD    CHIEF COMPLAINT or REASON FOR VISIT: Follow-up (Post TFESI/Patient reports about 75% relief from injection. ), Back Pain, and Numbness (Feet, bilateral. )      Initial history of present illness on 02/09/2024:  Mr. Behzad Peoples is 69 y.o. male who presents as a new patient referral for evaluation treatment of chronic axial low back pain with radiation to bilateral lower extremities and feet as well as a 6-month history of left-sided low back pain with radiation to the buttock.  Mr. Peoples has past medical history of lumbar laminectomy with Dr. Quiros several years ago.  At that time he had been experiencing significant problems with motor dysfunction and difficulty ambulating.  The symptoms resolved with surgery however he continued to have low back pain and bilateral foot numbness and tingling especially with prolonged standing ambulation.  On the last 6 months he has noticed worsening of his left-sided low back pain again with ambulation and prolonged standing.  He sought consultation with Dr. Workman, neurosurgery, who identified a very low-grade L4-5 listhesis which appears to be dynamic on x-ray.  Patient and wife would very much like to avoid lumbar fusion or further surgery.  Patient denies any bowel or bladder dysfunction, lower extremity weakness, new onset saddle anesthesia or unexplained weight loss.   He did have epidural steroid injections prior to his last surgery with variable effect.    Interval history:   Patient returns to clinic today after undergoing a repeat lumbar transforaminal epidural steroid injection.  He reports good relief from this procedure.  He still has back pain with radiation to the lower extremities and feet but it is improved after this injection.  We have previously discussed spinal cord stimulator trial.  Patient was interested in this initially however  would like to put off a trial as long as he is still responding to repeat epidural steroid injections.   Unfortunately, he has had changes in his health since his last office visit.  He was found to have a DVT in his left lower extremity which necessitated a thrombectomy performed by Dr. Junior Charlie MD. He is on Xarelto and has a follow-up appointment with Dr. Guerra scheduled.  Additionally, he is following with hematology/oncology regarding the etiology of his thrombosis.      Interventions:  2/15/2024: Left L3/4 and L4/5 TFESI with 100% relief for 4 months  7/16/2024: Repeat left L3/4 and L4/5 TFESI with 75% relief ongoing    Objective Pain Scoring:   BRIEF PAIN INVENTORY:  Total score:   Pain Score    10/18/24 1122   PainSc: 0-No pain   PainLoc: Back        PHQ-2:    PHQ-9:    Opioid Risk Tool:         Review of Systems:   ROS negative except as otherwise noted     Past Medical History:   Past Medical History:   Diagnosis Date    Allergic     Arthritis     Bronchitis     Bulging of cervical intervertebral disc 06/21/2018    DVT (deep venous thrombosis)     GERD (gastroesophageal reflux disease)     History of SCC (squamous cell carcinoma) of skin     Hyperlipidemia     Hypertension     Low back pain 1988    Melanoma     Melanoma     scalp    Osteoarthritis 2006    Spinal stenosis 2006    Arthritis and stenosis throughout spine    Wears glasses          Past Surgical History:   Past Surgical History:   Procedure Laterality Date    BACK SURGERY  3/2021    Laminectomy x2    COLONOSCOPY  2014    HERNIA REPAIR  8/2021    LUMBAR LAMINECTOMY DISCECTOMY DECOMPRESSION Bilateral 03/19/2021    Procedure: LUMBAR DECOMPRESSION L3, L4;  Surgeon: Saran Quiros MD;  Location: [facility];  Service: Neurosurgery;  Laterality: Bilateral;    SKIN CANCER EXCISION      SPINE SURGERY  3/2021    Laminectomy x 2    THROMBECTOMY Left 10/15/2024    Left leg, [facility]         Family History   Family History   Problem Relation Age  "of Onset    Arthritis Father     Diabetes Father     Hearing loss Father     Hyperlipidemia Father     Thyroid disease Father          Social History   Social History     Socioeconomic History    Marital status:    Tobacco Use    Smoking status: Never    Smokeless tobacco: Never   Vaping Use    Vaping status: Never Used   Substance and Sexual Activity    Alcohol use: Not Currently     Comment: 1 drink a week    Drug use: No    Sexual activity: Defer        Medications:     Current Outpatient Medications:     celecoxib (CeleBREX) 200 MG capsule, Take 1 capsule by mouth Daily., Disp: , Rfl:     ezetimibe (Zetia) 10 MG tablet, Take 1 tablet by mouth Daily., Disp: 90 tablet, Rfl: 1    famotidine (PEPCID) 40 MG tablet, Take 1 tablet by mouth Every Night., Disp: 90 tablet, Rfl: 3    lisinopril-hydrochlorothiazide (PRINZIDE,ZESTORETIC) 20-12.5 MG per tablet, Take 1 tablet by mouth Daily., Disp: 90 tablet, Rfl: 3    multivitamin with minerals tablet tablet, Take 1 tablet by mouth Daily., Disp: , Rfl:     rivaroxaban (XARELTO) 20 MG tablet, Take 1 tablet by mouth Daily. Hold on file, Disp: 30 tablet, Rfl: 5    rivaroxaban (XARELTO) 15 MG tablet, Take 1 tablet by mouth 2 (Two) Times a Day With Meals. (Patient not taking: Reported on 10/18/2024), Disp: 42 tablet, Rfl: 0        Physical Exam:     Vitals:    10/18/24 1122   Weight: 112 kg (246 lb)   Height: 185.4 cm (72.99\")   PainSc: 0-No pain   PainLoc: Back        General: Alert and oriented, No acute distress.   HEENT: Normocephalic, atraumatic.   Cardiovascular: No gross edema  Respiratory: Respirations are non-labored    Lumbar Spine:   No masses or atrophy  Range of motion - Flexion normal. Extension normal.    Facet Loading: Negative bilaterally  Facet Palpation - Nontender   PSIS tenderness - Negative bilaterally  Derek's/KATIE/Thigh thrust -   Straight leg raise/slump test: Negative bilaterally      Motor Exam:        Strength: Rate on 1-5 scale Right " Left    L1/2- hip flexion 5/5  5/5    L3- knee extension 5/5  5/5    L4- ankle dorsiflexion 5/5  5/5    L5- great toe extension 5/5  5/5    S1- ankle plantarflexion 5/5  5/5    Sensory Exam: Full and equal sensation to light touch throughout.  Neurologic: Cranial Nerves II-XII are grossly intact.   Psychiatric: Cooperative.   Gait: Antalgic flexed forward  Assistive Devices: None      Imaging Studies:   Results for orders placed during the hospital encounter of 10/06/23    MRI Lumbar Spine Without Contrast    Narrative  MRI LUMBAR SPINE WO CONTRAST    Date of Exam: 10/6/2023 2:23 PM EDT    Indication: low back pain.    Comparison: Radiographs 8/21/2023.    Technique:  Routine multiplanar/multisequence sequence images of the lumbar spine were obtained without contrast administration.    Findings:  There is evidence of prior posterior decompression at L3-4 and L4-5. T1 marrow signal is preserved, without evidence of fracture or suspicious marrow replacing lesion. There is mild grade 1 anterolisthesis of L3 on L4 and L4 on L5. The conus medullaris  and cauda equina nerve roots are satisfactory in appearance. The paraspinal soft tissues demonstrate no acute or suspicious findings. Multilevel spondylosis is present, with areas of involvement including    L1-2, no significant spinal canal or neuroforaminal impingement.    L2-3, small disc bulge and bilateral facet arthropathy with some ligamentum flavum thickening. There is moderate spinal canal narrowing and moderate to severe bilateral neuroforaminal stenosis, greater on the left.    L3-4, disc bulge is present, eccentric to the left. The spinal canal is patent following operative decompression. There is severe left and mild right neuroforaminal narrowing.    L4-5, postoperative changes with circumferential disc bulge and bilateral facet arthropathy present. The spinal canal remains decompressed. There is moderate to severe left and mild right neuroforaminal  narrowing.    L5-S1, small disc bulge and bilateral facet arthropathy. The spinal canal is patent. There is moderate bilateral neuroforaminal narrowing, slightly worse on the left.    Impression  Impression:  Patent spinal canal at the previously decompressed L3-4 and L4-5 levels. These levels demonstrate some listhesis and areas of disc bulge with resultant severe narrowing at the left neural foramen at each level. There is also some moderate spinal canal  narrowing and moderate to severe bilateral neuroforaminal stenosis at the adjacent L2-3 level.    Electronically Signed: Thanh Field MD  10/6/2023 4:28 PM EDT  Workstation ID: [id number]      Results for orders placed during the hospital encounter of 12/07/20    MRI Lumbar Spine Without Contrast    Narrative  EXAMINATION: MRI LUMBAR SPINE WO CONTRAST-    INDICATION: Back and leg pain after fall; M43.16-Spondylolisthesis,  lumbar region; M48.062-Spinal stenosis, lumbar region with neurogenic  claudication; right leg pain and weakness.    TECHNIQUE: Routine multiplanar imaging was obtained of the lumbar spine  without the administration of gadolinium contrast.    COMPARISON: None.    FINDINGS: There is disc desiccation seen at the L2-L3, L3-L4, L4-L5 and  L5-S1 levels. Mild anterior spurring and posterior osteophyte formation  identified. There is mild anterolisthesis of L3 on L4. Normal signal  intensity within the conus. The spinal cord terminates at the L1 level.  The facets are well aligned. No abnormal mass or fluid collection is  seen within the paraspinal muscles.    Axial imaging reveals at the L1-L2 level with no significant central  spinal canal stenosis. No neural foraminal narrowing or nerve root  compromise.    At the L2-L3 level, there is a broad-based disc bulge with some  lateralization to the left. There is moderate narrowing of the left  neuroforamina. Mild to moderate central spinal canal stenosis. Nerve  root contact on the left cannot be  excluded.    At the L3-L4 level, there is a broad-based disc bulge with  lateralization to the left. Severe narrowing of the left neuroforamina.  Moderate to severe central spinal canal stenosis with degenerative  changes seen in the posterior facets and thickening of the posterior  ligamentum flavum.    At the L4-L5 level, there is a broad-based disc bulge identified with  some lateralization to the right creating moderate to severe right  neuroforaminal narrowing and possible nerve root contact on the right.  Moderate to severe central spinal canal stenosis.    At the L5-S1 level, there is a broad-based disc bulge creating narrowing  of the neuroforamina bilaterally right greater than left. Nerve root  contact on the right cannot be excluded.    Impression  Multilevel degenerative changes identified diffusely  throughout the lumbar spine from L2 through S1. There is neural  foraminal narrowing on the left at the L2-L3 and L3-L4 level and neural  foraminal narrowing on the right at the L4-L5 and L5-S1 level. There is  moderate to severe central spinal canal stenosis most pronounced at the  L3-L4 level in which nerve root contact and compromise cannot be  excluded. Clinical correlation is needed.    D:  12/07/2020  E:  12/07/2020    This report was finalized on 12/7/2020 9:01 PM by Dr. Vale Hair MD.      Impression & Plan:       02/09/2024: Behzad Peoples is a 69 y.o. male with past medical history significant for GERD, HLD, HTN, L3, L4 laminectomy in 2021 with Dr. Quiros who presents to the pain clinic for evaluation and treatment of persistent low back pain with radiation to bilateral lower extremities.  I personally reviewed and interpreted his lumbar MRI demonstrating: L5/S1 DDD; L3/L4 grade 1 anterolisthesis; L4/L5 grade 1 anterolisthesis; left L3/L4 NFS; L3/4 and L4/5 facet spondylosis; multifidus atrophy.  Examination consistent with lumbar radiculopathy, postlaminectomy syndrome.  We discussed  epidural steroid injection to improve pain.  If greater than 50% relief for at least 2-3 months can consider repeat as needed every 3 to 4 months.  I had a discussion with the patient regarding the risks of the procedure including bleeding, infection, damage to surrounding structures.  We discussed the potential adverse effects of corticosteroid injection including flushing of the face, lipodystrophy, skin discoloration, elevated blood glucose, increased blood pressure.  Risks of frequent steroid administration include weight gain, hormonal changes, mood changes, osteoporosis.  Additionally discussed spinal cord stimulator trial.  Patient would like to pursue trial, and he first needs to find elder care for his father.    2024: Excellent relief from TFESI.  Can repeat if needed.  If minimal or transient benefit we will proceed with SCS trial  24: Excellent relief from TFESI.  Will plan for repeat.  Will plan for SCS trial if minimal or transient benefit, or whenever patient is in a place to undergo this procedure.  10/18/2024: Good relief from repeat TFESI.  Can consider repeat if needed.  Can consider SCS trial.      1. Postlaminectomy syndrome    2. Lumbar radiculopathy              PLAN:  1. Medication Recommendations: Recommend Voltaren topical, NSAIDs, Tylenol.  Can trial turmeric 500 mg twice daily if NSAID contraindicated.  -Can consider gabapentin/pregabalin    2. Physical Therapy: Continue HEP    3. Psychological: Psychiatric clearance has already been obtained at last office visit    4. Complementary and alternative (CAM) Therapies:     5. Labs/Diagnostic studies: None indicated     6. Imagin. Interventions: Can consider repeat left L3/L4 and L4/L5 transforaminal epidural steroid injection ([id number], [id number]).  If minimal or transient benefit from repeat injection we will pursue SCS trial with [employer] if patient would like.   -Will delay any additional interventions until after he recovers from  DVT and thrombectomy.  Additional TFESI's or SCS trial would require holding of Xarelto    8. Referrals:     9. Records: ED notes reviewed; vascular surgery notes reviewed    10. Lifestyle goals:    Follow-up 4-6 months in [facility]  Nika Mancia PA-C

## 2024-10-29 ENCOUNTER — OFFICE VISIT (OUTPATIENT)
Dept: ORTHOPEDIC SURGERY | Facility: CLINIC | Age: 69
End: 2024-10-29
Payer: MEDICARE

## 2024-10-29 VITALS
BODY MASS INDEX: 32.72 KG/M2 | HEIGHT: 73 IN | WEIGHT: 246.91 LBS | DIASTOLIC BLOOD PRESSURE: 70 MMHG | SYSTOLIC BLOOD PRESSURE: 124 MMHG

## 2024-10-29 DIAGNOSIS — I82.412 ACUTE DEEP VEIN THROMBOSIS (DVT) OF FEMORAL VEIN OF LEFT LOWER EXTREMITY: ICD-10-CM

## 2024-10-29 DIAGNOSIS — M25.562 LEFT KNEE PAIN, UNSPECIFIED CHRONICITY: Primary | ICD-10-CM

## 2024-10-29 DIAGNOSIS — M17.12 PRIMARY OSTEOARTHRITIS OF LEFT KNEE: ICD-10-CM

## 2024-10-31 RX ORDER — EZETIMIBE 10 MG/1
10 TABLET ORAL DAILY
Qty: 90 TABLET | Refills: 1 | Status: SHIPPED | OUTPATIENT
Start: 2024-10-31

## 2024-11-05 ENCOUNTER — CONSULT (OUTPATIENT)
Dept: ONCOLOGY | Facility: CLINIC | Age: 69
End: 2024-11-05
Payer: MEDICARE

## 2024-11-05 VITALS
WEIGHT: 247 LBS | BODY MASS INDEX: 32.74 KG/M2 | RESPIRATION RATE: 18 BRPM | TEMPERATURE: 98.6 F | SYSTOLIC BLOOD PRESSURE: 137 MMHG | OXYGEN SATURATION: 97 % | HEIGHT: 73 IN | DIASTOLIC BLOOD PRESSURE: 81 MMHG | HEART RATE: 68 BPM

## 2024-11-05 DIAGNOSIS — I82.412 ACUTE DEEP VEIN THROMBOSIS (DVT) OF FEMORAL VEIN OF LEFT LOWER EXTREMITY: Primary | ICD-10-CM

## 2024-11-05 NOTE — PROGRESS NOTES
CHIEF COMPLAINT: Left leg swelling and pain improving    REASON FOR REFERRAL: Left leg DVT      RECORDS OBTAINED  Records of the patients history including those obtained from [facility] records were reviewed and summarized in detail.    Oncology/Hematology History Overview Note   1.  Unprovoked DVT  2.  Reflux  3.  Squamous cell carcinoma of the skin  4.  Cervical herniated disc  5.  History of melanoma scalp  6.  Spinal stenosis with history of laminectomy Dr. Quiros March 2021  7.  Hyperlipidemia  8.  Hypertension  9.  Colonoscopy 2014      -9/15/2024 emergency room note indicates atraumatic pain and swelling left thigh chronic low back pain somewhat radicular down the left lower extremity.  Doppler venous imaging showed left lower extremity DVT involving the proximal mid and distal femoral veins, popliteal, posterior tibial, peroneal, gastrocnemius, and soleal veins.    -11/5/2024 [facility] hematology consult: Patient had unprovoked painful swelling due to DVT mid-September with mild improvement on Xarelto but with significant column of clot necessitating thrombectomy mid October 2024 Dr. Guerra.  Here for hypercoagulable evaluation.  Dr. Guerra plans repeat ultrasound to check for efficacy of thrombectomy and Xarelto full dose and to look for May Thurner or other anatomic causes.  I will see him back in the weeks ahead to go over the results of my testing but, even if these tests are negative, given the magnitude of his clot and the lack of provocation I will keep him on lifetime anticoagulation likely with low-dose Eliquis 2.5 mg twice daily after 6 months of Xarelto full dose.  He has had a colonoscopy in the last couple of years and states that he has had a PSA and digital rectal exam that was benign within the last couple of years.  His melanoma was in 2011.  Did not have staging that they are aware of but just did wide local excision so I assume that it must been relatively superficial but I would not put him  through extensive restaging imaging at this point 13 years out.  If he has antiphospholipid antibody syndrome he would need Coumadin instead of DOAC but parenthetically I would add the ADRIANO this last summer was negative but that does not of course rule out the possibility.     Acute deep vein thrombosis (DVT) of femoral vein of left lower extremity       HISTORY OF PRESENT ILLNESS:  The patient is a 69 y.o.  male, referred for DVT presenting mid-September without provocation with swelling and pain progressing/persisting on 4 weeks of Xarelto hence had subsequent thrombectomy by vascular surgery in mid October and now feeling much better.  Due for ultrasound in the next week or 2 with vascular surgery    REVIEW OF SYSTEMS:  No new somatic complaints    Past Medical History:   Diagnosis Date    Allergic     Arthritis     Bronchitis     Bulging of cervical intervertebral disc 06/21/2018    DVT (deep venous thrombosis)     GERD (gastroesophageal reflux disease)     History of SCC (squamous cell carcinoma) of skin     Hyperlipidemia     Hypertension     Low back pain 1988    Melanoma     Melanoma     scalp    Osteoarthritis 2006    Spinal stenosis 2006    Arthritis and stenosis throughout spine    Wears glasses      Past Surgical History:   Procedure Laterality Date    BACK SURGERY  3/2021    Laminectomy x2    COLONOSCOPY  2014    HERNIA REPAIR  8/2021    LUMBAR LAMINECTOMY DISCECTOMY DECOMPRESSION Bilateral 03/19/2021    Procedure: LUMBAR DECOMPRESSION L3, L4;  Surgeon: Saran Quiros MD;  [facility]  Service: Neurosurgery;  Laterality: Bilateral;    SKIN CANCER EXCISION      SPINE SURGERY  3/2021    Laminectomy x 2    THROMBECTOMY Left 10/15/2024    Left leg, [facility]       Current Outpatient Medications on File Prior to Visit   Medication Sig Dispense Refill    celecoxib (CeleBREX) 200 MG capsule Take 1 capsule by mouth Daily.      ezetimibe (Zetia) 10 MG tablet Take 1 tablet by mouth Daily. 90 tablet 1  "   famotidine (PEPCID) 40 MG tablet Take 1 tablet by mouth Every Night. 90 tablet 3    lisinopril-hydrochlorothiazide (PRINZIDE,ZESTORETIC) 20-12.5 MG per tablet Take 1 tablet by mouth Daily. 90 tablet 3    multivitamin with minerals tablet tablet Take 1 tablet by mouth Daily.      rivaroxaban (XARELTO) 20 MG tablet Take 1 tablet by mouth Daily. Hold on file 30 tablet 5    [DISCONTINUED] rivaroxaban (XARELTO) 15 MG tablet Take 1 tablet by mouth 2 (Two) Times a Day With Meals. 42 tablet 0     No current facility-administered medications on file prior to visit.       Allergies   Allergen Reactions    Bee Venom Anaphylaxis     Takes shots to prevent    Omeprazole Rash    Protonix [Pantoprazole] Rash     Any of the omeprazole drugs    Sulfa Antibiotics Rash     Pt can't remember reaction       Social History     Socioeconomic History    Marital status:    Tobacco Use    Smoking status: Never    Smokeless tobacco: Never   Vaping Use    Vaping status: Never Used   Substance and Sexual Activity    Alcohol use: Not Currently     Comment: 1 drink a week    Drug use: No    Sexual activity: Defer       Family History   Problem Relation Age of Onset    Arthritis Father     Diabetes Father     Hearing loss Father     Hyperlipidemia Father     Thyroid disease Father        PHYSICAL EXAM:  No jaundice icterus or pallor.  No venous valvular insufficiency changes or venous stasis changes.  No persistent swelling left lower extremity.  No respiratory distress.    /81   Pulse 68   Temp 98.6 °F (37 °C)   Resp 18   Ht 185.4 cm (73\")   Wt 112 kg (247 lb)   SpO2 97%   BMI 32.59 kg/m²     ECOG score: 0     Lab Results   Component Value Date    HGB 15.3 09/15/2024    HCT 45 09/15/2024    MCV 92 05/16/2024     05/16/2024    WBC 6.0 05/16/2024    NEUTROABS 3.9 05/16/2024    LYMPHSABS 1.4 05/16/2024    MONOSABS 0.5 05/16/2024    EOSABS 0.2 05/16/2024    BASOSABS 0.1 05/16/2024     Lab Results   Component Value Date "    GLUCOSE 104 (H) 08/16/2024    BUN 10 08/16/2024    CREATININE 1.10 09/15/2024     08/16/2024    K 4.1 08/16/2024     08/16/2024    CO2 22 08/16/2024    CALCIUM 9.6 08/16/2024    PROTEINTOT 6.9 03/17/2021    ALBUMIN 4.2 08/16/2024    BILITOT 0.6 08/16/2024    ALKPHOS 64 08/16/2024    AST 18 08/16/2024    ALT 24 08/16/2024         Assessment & Plan   1.  Unprovoked DVT  2.  Reflux  3.  Squamous cell carcinoma of the skin  4.  Cervical herniated disc  5.  History of melanoma scalp  6.  Spinal stenosis with history of laminectomy Dr. Quiros March 2021  7.  Hyperlipidemia  8.  Hypertension  9.  Colonoscopy 2014      -9/15/2024 emergency room note indicates atraumatic pain and swelling left thigh chronic low back pain somewhat radicular down the left lower extremity.  Doppler venous imaging showed left lower extremity DVT involving the proximal mid and distal femoral veins, popliteal, posterior tibial, peroneal, gastrocnemius, and soleal veins.  -10/15/2024 thrombectomy Dr. Guerra at [facility]    -11/5/2024 [facility] consult: Patient had unprovoked painful swelling due to DVT mid-September with mild improvement on Xarelto but with significant column of clot necessitating thrombectomy mid October 2024 Dr. Guerra.  Here for hypercoagulable evaluation.  Dr. Guerra plans repeat ultrasound to check for efficacy of thrombectomy and Xarelto full dose and to look for May Thurner or other anatomic causes.  I will see him back in the weeks ahead to go over the results of my testing but, even if these tests are negative, given the magnitude of his clot and the lack of provocation I will keep him on lifetime anticoagulation likely with low-dose Eliquis 2.5 mg twice daily after 6 months of Xarelto full dose.  He has had a colonoscopy in the last couple of years and states that he has had a PSA and digital rectal exam that was benign within the last couple of years.  His melanoma was in 2011.  Did not have  staging that they are aware of but just did wide local excision so I assume that it must been relatively superficial but I would not put him through extensive restaging imaging at this point 13 years out.  If he has antiphospholipid antibody syndrome he would need Coumadin instead of DOAC but parenthetically I would add the ADRIANO this last summer was negative but that does not of course rule out the possibility.    Total time of care today inclusive of time spent today inclusive of time spent today prior to patient's arrival reviewing past data and outlining as above and during visit translating information to patient and going over the pathophysiologic causes and consequences and management of unprovoked VTE and after visit instituting this plan and communicating with other physicians took 1 hour patient care time throughout the day today.  Gerardo Renee MD    11/5/2024

## 2024-11-05 NOTE — LETTER
November 5, 2024     Rigo Mustafa MD  [address] [facility] [place] [address]    Patient: Behzad Peoples   YOB: 1955   Date of Visit: 11/5/2024     Dear Rigo Mustafa MD:       Thank you for referring Behzad Peoples to me for evaluation. Below are the relevant portions of my assessment and plan of care.    If you have questions, please do not hesitate to call me. I look forward to following Behzad along with you.         Sincerely,        Gerardo Renee MD        CC: MD Derek Ty APRN Hicks, Lee G, MD  11/05/24 1145  Sign when Signing Visit  CHIEF COMPLAINT: Left leg swelling and pain improving    REASON FOR REFERRAL: Left leg DVT      RECORDS OBTAINED  Records of the patients history including those obtained from [facility] records were reviewed and summarized in detail.    Oncology/Hematology History Overview Note   1.  Unprovoked DVT  2.  Reflux  3.  Squamous cell carcinoma of the skin  4.  Cervical herniated disc  5.  History of melanoma scalp  6.  Spinal stenosis with history of laminectomy Dr. Quiros March 2021  7.  Hyperlipidemia  8.  Hypertension  9.  Colonoscopy 2014      -9/15/2024 emergency room note indicates atraumatic pain and swelling left thigh chronic low back pain somewhat radicular down the left lower extremity.  Doppler venous imaging showed left lower extremity DVT involving the proximal mid and distal femoral veins, popliteal, posterior tibial, peroneal, gastrocnemius, and soleal veins.    -11/5/2024 [facility] hematology consult: Patient had unprovoked painful swelling due to DVT mid-September with mild improvement on Xarelto but with significant column of clot necessitating thrombectomy mid October 2024 Dr. Guerra.  Here for hypercoagulable evaluation.  Dr. Guerra plans repeat ultrasound to check for efficacy of thrombectomy and Xarelto full dose and to look for May Thurner or other anatomic causes.  I will see him back in the weeks  ahead to go over the results of my testing but, even if these tests are negative, given the magnitude of his clot and the lack of provocation I will keep him on lifetime anticoagulation likely with low-dose Eliquis 2.5 mg twice daily after 6 months of Xarelto full dose.  He has had a colonoscopy in the last couple of years and states that he has had a PSA and digital rectal exam that was benign within the last couple of years.  His melanoma was in 2011.  Did not have staging that they are aware of but just did wide local excision so I assume that it must been relatively superficial but I would not put him through extensive restaging imaging at this point 13 years out.  If he has antiphospholipid antibody syndrome he would need Coumadin instead of DOAC but parenthetically I would add the ADRIANO this last summer was negative but that does not of course rule out the possibility.     Acute deep vein thrombosis (DVT) of femoral vein of left lower extremity       HISTORY OF PRESENT ILLNESS:  The patient is a 69 y.o.  male, referred for DVT presenting mid-September without provocation with swelling and pain progressing/persisting on 4 weeks of Xarelto hence had subsequent thrombectomy by vascular surgery in mid October and now feeling much better.  Due for ultrasound in the next week or 2 with vascular surgery    REVIEW OF SYSTEMS:  No new somatic complaints    Past Medical History:   Diagnosis Date   • Allergic    • Arthritis    • Bronchitis    • Bulging of cervical intervertebral disc 06/21/2018   • DVT (deep venous thrombosis)    • GERD (gastroesophageal reflux disease)    • History of SCC (squamous cell carcinoma) of skin    • Hyperlipidemia    • Hypertension    • Low back pain 1988   • Melanoma    • Melanoma     scalp   • Osteoarthritis 2006   • Spinal stenosis 2006    Arthritis and stenosis throughout spine   • Wears glasses      Past Surgical History:   Procedure Laterality Date   • BACK SURGERY  3/2021    Laminectomy  x2   • COLONOSCOPY  2014   • HERNIA REPAIR  8/2021   • LUMBAR LAMINECTOMY DISCECTOMY DECOMPRESSION Bilateral 03/19/2021    Procedure: LUMBAR DECOMPRESSION L3, L4;  Surgeon: Saran Quiros MD;  Location: [facility];  Service: Neurosurgery;  Laterality: Bilateral;   • SKIN CANCER EXCISION     • SPINE SURGERY  3/2021    Laminectomy x 2   • THROMBECTOMY Left 10/15/2024    Left leg, [facility]       Current Outpatient Medications on File Prior to Visit   Medication Sig Dispense Refill   • celecoxib (CeleBREX) 200 MG capsule Take 1 capsule by mouth Daily.     • ezetimibe (Zetia) 10 MG tablet Take 1 tablet by mouth Daily. 90 tablet 1   • famotidine (PEPCID) 40 MG tablet Take 1 tablet by mouth Every Night. 90 tablet 3   • lisinopril-hydrochlorothiazide (PRINZIDE,ZESTORETIC) 20-12.5 MG per tablet Take 1 tablet by mouth Daily. 90 tablet 3   • multivitamin with minerals tablet tablet Take 1 tablet by mouth Daily.     • rivaroxaban (XARELTO) 20 MG tablet Take 1 tablet by mouth Daily. Hold on file 30 tablet 5   • [DISCONTINUED] rivaroxaban (XARELTO) 15 MG tablet Take 1 tablet by mouth 2 (Two) Times a Day With Meals. 42 tablet 0     No current facility-administered medications on file prior to visit.       Allergies   Allergen Reactions   • Bee Venom Anaphylaxis     Takes shots to prevent   • Omeprazole Rash   • Protonix [Pantoprazole] Rash     Any of the omeprazole drugs   • Sulfa Antibiotics Rash     Pt can't remember reaction       Social History     Socioeconomic History   • Marital status:    Tobacco Use   • Smoking status: Never   • Smokeless tobacco: Never   Vaping Use   • Vaping status: Never Used   Substance and Sexual Activity   • Alcohol use: Not Currently     Comment: 1 drink a week   • Drug use: No   • Sexual activity: Defer       Family History   Problem Relation Age of Onset   • Arthritis Father    • Diabetes Father    • Hearing loss Father    • Hyperlipidemia Father    • Thyroid disease Father   "      PHYSICAL EXAM:  No jaundice icterus or pallor.  No venous valvular insufficiency changes or venous stasis changes.  No persistent swelling left lower extremity.  No respiratory distress.    /81   Pulse 68   Temp 98.6 °F (37 °C)   Resp 18   Ht 185.4 cm (73\")   Wt 112 kg (247 lb)   SpO2 97%   BMI 32.59 kg/m²     ECOG score: 0     Lab Results   Component Value Date    HGB 15.3 09/15/2024    HCT 45 09/15/2024    MCV 92 05/16/2024     05/16/2024    WBC 6.0 05/16/2024    NEUTROABS 3.9 05/16/2024    LYMPHSABS 1.4 05/16/2024    MONOSABS 0.5 05/16/2024    EOSABS 0.2 05/16/2024    BASOSABS 0.1 05/16/2024     Lab Results   Component Value Date    GLUCOSE 104 (H) 08/16/2024    BUN 10 08/16/2024    CREATININE 1.10 09/15/2024     08/16/2024    K 4.1 08/16/2024     08/16/2024    CO2 22 08/16/2024    CALCIUM 9.6 08/16/2024    PROTEINTOT 6.9 03/17/2021    ALBUMIN 4.2 08/16/2024    BILITOT 0.6 08/16/2024    ALKPHOS 64 08/16/2024    AST 18 08/16/2024    ALT 24 08/16/2024         Assessment & Plan  1.  Unprovoked DVT  2.  Reflux  3.  Squamous cell carcinoma of the skin  4.  Cervical herniated disc  5.  History of melanoma scalp  6.  Spinal stenosis with history of laminectomy Dr. Quiros March 2021  7.  Hyperlipidemia  8.  Hypertension  9.  Colonoscopy 2014      -9/15/2024 emergency room note indicates atraumatic pain and swelling left thigh chronic low back pain somewhat radicular down the left lower extremity.  Doppler venous imaging showed left lower extremity DVT involving the proximal mid and distal femoral veins, popliteal, posterior tibial, peroneal, gastrocnemius, and soleal veins.  -10/15/2024 thrombectomy Dr. Guerra at [facility]    -11/5/2024 [facility] hematology consult: Patient had unprovoked painful swelling due to DVT mid-September with mild improvement on Xarelto but with significant column of clot necessitating thrombectomy mid October 2024 Dr. Guerra.  Here for hypercoagulable " evaluation.  Dr. Guerra plans repeat ultrasound to check for efficacy of thrombectomy and Xarelto full dose and to look for May Thurner or other anatomic causes.  I will see him back in the weeks ahead to go over the results of my testing but, even if these tests are negative, given the magnitude of his clot and the lack of provocation I will keep him on lifetime anticoagulation likely with low-dose Eliquis 2.5 mg twice daily after 6 months of Xarelto full dose.  He has had a colonoscopy in the last couple of years and states that he has had a PSA and digital rectal exam that was benign within the last couple of years.  His melanoma was in 2011.  Did not have staging that they are aware of but just did wide local excision so I assume that it must been relatively superficial but I would not put him through extensive restaging imaging at this point 13 years out.  If he has antiphospholipid antibody syndrome he would need Coumadin instead of DOAC but parenthetically I would add the ADRIANO this last summer was negative but that does not of course rule out the possibility.    Total time of care today inclusive of time spent today inclusive of time spent today prior to patient's arrival reviewing past data and outlining as above and during visit translating information to patient and going over the pathophysiologic causes and consequences and management of unprovoked VTE and after visit instituting this plan and communicating with other physicians took 1 hour patient care time throughout the day today.  Gerardo Renee MD    11/5/2024

## 2024-11-06 ENCOUNTER — LAB (OUTPATIENT)
Dept: LAB | Facility: HOSPITAL | Age: 69
End: 2024-11-06
Payer: MEDICARE

## 2024-11-06 DIAGNOSIS — I82.412 ACUTE DEEP VEIN THROMBOSIS (DVT) OF FEMORAL VEIN OF LEFT LOWER EXTREMITY: ICD-10-CM

## 2024-11-06 PROCEDURE — 86147 CARDIOLIPIN ANTIBODY EA IG: CPT

## 2024-11-06 PROCEDURE — 85613 RUSSELL VIPER VENOM DILUTED: CPT

## 2024-11-06 PROCEDURE — 86146 BETA-2 GLYCOPROTEIN ANTIBODY: CPT

## 2024-11-06 PROCEDURE — 81241 F5 GENE: CPT

## 2024-11-06 PROCEDURE — 81240 F2 GENE: CPT

## 2024-11-06 PROCEDURE — 85303 CLOT INHIBIT PROT C ACTIVITY: CPT

## 2024-11-06 PROCEDURE — 85305 CLOT INHIBIT PROT S TOTAL: CPT

## 2024-11-06 PROCEDURE — 85302 CLOT INHIBIT PROT C ANTIGEN: CPT

## 2024-11-06 PROCEDURE — 85670 THROMBIN TIME PLASMA: CPT

## 2024-11-06 PROCEDURE — 85300 ANTITHROMBIN III ACTIVITY: CPT

## 2024-11-06 PROCEDURE — 85705 THROMBOPLASTIN INHIBITION: CPT

## 2024-11-06 PROCEDURE — 36415 COLL VENOUS BLD VENIPUNCTURE: CPT

## 2024-11-06 PROCEDURE — 85732 THROMBOPLASTIN TIME PARTIAL: CPT

## 2024-11-06 PROCEDURE — 85306 CLOT INHIBIT PROT S FREE: CPT

## 2024-11-07 LAB
AT III ACT/NOR PPP CHRO: 95 % (ref 75–135)
B2 GLYCOPROT1 IGA SER-ACNC: <9 GPI IGA UNITS (ref 0–25)
B2 GLYCOPROT1 IGG SER-ACNC: <9 GPI IGG UNITS (ref 0–20)
B2 GLYCOPROT1 IGM SER-ACNC: <9 GPI IGM UNITS (ref 0–32)
CARDIOLIPIN IGG SER IA-ACNC: <9 GPL U/ML (ref 0–14)
CARDIOLIPIN IGM SER IA-ACNC: <9 MPL U/ML (ref 0–12)

## 2024-11-08 LAB
APTT SCREEN TO CONFIRM RATIO: 1 RATIO (ref 0–1.34)
CONFIRM APTT/NORMAL: 39.4 SEC (ref 0–47.6)
F5 GENE MUT ANL BLD/T: NORMAL
FACTOR II, DNA ANALYSIS: NORMAL
LA 2 SCREEN W REFLEX-IMP: NORMAL
PROT C ACT/NOR PPP: 113 % (ref 73–180)
PROT S ACT/NOR PPP: 115 % (ref 63–140)
SCREEN APTT: 38.2 SEC (ref 0–43.5)
SCREEN DRVVT: 44.8 SEC (ref 0–47)
SPECIMEN STATUS: NORMAL
THROMBIN TIME: 20.7 SEC (ref 0–23)

## 2024-11-09 LAB — CARDIOLIPIN IGA SER IA-ACNC: <9 APL U/ML (ref 0–11)

## 2024-11-11 LAB
PROT C AG ACT/NOR PPP IA: 112 % (ref 60–150)
PROT S AG ACT/NOR PPP IA: 97 % (ref 60–150)
PROT S FREE AG ACT/NOR PPP IA: 125 % (ref 61–136)

## 2024-12-03 ENCOUNTER — OFFICE VISIT (OUTPATIENT)
Dept: ONCOLOGY | Facility: CLINIC | Age: 69
End: 2024-12-03
Payer: MEDICARE

## 2024-12-03 VITALS
OXYGEN SATURATION: 97 % | DIASTOLIC BLOOD PRESSURE: 81 MMHG | HEIGHT: 73 IN | WEIGHT: 252 LBS | BODY MASS INDEX: 33.4 KG/M2 | RESPIRATION RATE: 18 BRPM | TEMPERATURE: 97.8 F | SYSTOLIC BLOOD PRESSURE: 157 MMHG | HEART RATE: 67 BPM

## 2024-12-03 DIAGNOSIS — I82.412 ACUTE DEEP VEIN THROMBOSIS (DVT) OF FEMORAL VEIN OF LEFT LOWER EXTREMITY: Primary | ICD-10-CM

## 2024-12-03 NOTE — LETTER
December 3, 2024     Rigo Mustafa MD  [address] [facility] [place] [address]    Patient: Behzad Peoples   YOB: 1955   Date of Visit: 12/3/2024     Dear Rigo Mustafa MD:       Thank you for referring Behzad Peoples to me for evaluation. Below are the relevant portions of my assessment and plan of care.    If you have questions, please do not hesitate to call me. I look forward to following Behzad along with you.         Sincerely,        Gerardo Renee MD        CC: MD Derek Ty APRN [employer], MD Hicks, Lee G, MD  12/03/24 0957  Sign when Signing Visit  CHIEF COMPLAINT: Chronic lower extremity swelling    Problem List:  Oncology/Hematology History Overview Note   1.  Unprovoked DVT  2.  Reflux  3.  Squamous cell carcinoma of the skin  4.  Cervical herniated disc  5.  History of melanoma scalp  6.  Spinal stenosis with history of laminectomy Dr. Quiros March 2021  7.  Hyperlipidemia  8.  Hypertension  9.  Colonoscopy 2014      -9/15/2024 emergency room note indicates atraumatic pain and swelling left thigh chronic low back pain somewhat radicular down the left lower extremity.  Doppler venous imaging showed left lower extremity DVT involving the proximal mid and distal femoral veins, popliteal, posterior tibial, peroneal, gastrocnemius, and soleal veins.    -11/5/2024 [facility] hematology consult: Patient had unprovoked painful swelling due to DVT mid-September with mild improvement on Xarelto but with significant column of clot necessitating thrombectomy mid October 2024 Dr. Guerra.  Here for hypercoagulable evaluation.  Dr. Guerra plans repeat ultrasound to check for efficacy of thrombectomy and Xarelto full dose and to look for May Thurner or other anatomic causes.  I will see him back in the weeks ahead to go over the results of my testing but, even if these tests are negative, given the magnitude of his clot and the lack of provocation I will  keep him on lifetime anticoagulation likely with low-dose Eliquis 2.5 mg twice daily after 6 months of Xarelto full dose.  He has had a colonoscopy in the last couple of years and states that he has had a PSA and digital rectal exam that was benign within the last couple of years.  His melanoma was in 2011.  Did not have staging that they are aware of but just did wide local excision so I assume that it must been relatively superficial but I would not put him through extensive restaging imaging at this point 13 years out.  If he has antiphospholipid antibody syndrome he would need Coumadin instead of DOAC but parenthetically I would add the ADRIANO this last summer was negative but that does not of course rule out the possibility.    -11/6/2024 hypercoag panel negative  Beta-2 glycoprotein 1/anticardiolipin IgG IgA IgM/lupus anticoagulant all negative  Antithrombin III 95%  Protein C activity 113% antigen 112%  Protein S antigen total 97% free 825% functional 115%  Factor V Leiden and prothrombin gene mutations negative.    - 11/18/2024 Dopplers with Dr. Guerra shows subacute/chronic DVT mid femoral, distal femoral, popliteal, and gastrocnemius veins    -12/3/2024 [facility] hematology follow-up: Negative hypercoagulable workup.  Chronic lower extremity swelling slowly improving.  There is still persistent clot.  Patient reports that Dr. Guerra plans May Thurner study in February and I will see him in March.  If no further instrumentation planned, will switch him to lifetime low-dose Eliquis when he returns.     Acute deep vein thrombosis (DVT) of femoral vein of left lower extremity       HISTORY OF PRESENT ILLNESS:  The patient is a 69 y.o. male, here for follow up on management of unprovoked DVT with chronic lower extremity swelling improved but persistent on Xarelto full dose    Past Medical History:   Diagnosis Date   • Allergic    • Arthritis    • Bronchitis    • Bulging of cervical intervertebral disc 06/21/2018  "  • DVT (deep venous thrombosis)    • GERD (gastroesophageal reflux disease)    • History of SCC (squamous cell carcinoma) of skin    • Hyperlipidemia    • Hypertension    • Low back pain 1988   • Melanoma    • Melanoma     scalp   • Osteoarthritis 2006   • Spinal stenosis 2006    Arthritis and stenosis throughout spine   • Wears glasses      Past Surgical History:   Procedure Laterality Date   • BACK SURGERY  3/2021    Laminectomy x2   • COLONOSCOPY  2014   • HERNIA REPAIR  8/2021   • LUMBAR LAMINECTOMY DISCECTOMY DECOMPRESSION Bilateral 03/19/2021    Procedure: LUMBAR DECOMPRESSION L3, L4;  Surgeon: Saran Quiros MD;  Location: [facility];  Service: Neurosurgery;  Laterality: Bilateral;   • SKIN CANCER EXCISION     • SPINE SURGERY  3/2021    Laminectomy x 2   • THROMBECTOMY Left 10/15/2024    Left leg, [facility]       Allergies   Allergen Reactions   • Bee Venom Anaphylaxis     Takes shots to prevent   • Omeprazole Rash   • Protonix [Pantoprazole] Rash     Any of the omeprazole drugs   • Sulfa Antibiotics Rash     Pt can't remember reaction       Family History and Social History reviewed and changed as necessary    REVIEW OF SYSTEM:   Other than the chronic lower extremity swelling no new somatic complaints    PHYSICAL EXAM:  Some chronic lower extremity swelling without venous stasis insufficiency changes    Vitals:    12/03/24 0925   BP: 157/81   Pulse: 67   Resp: 18   Temp: 97.8 °F (36.6 °C)   SpO2: 97%   Weight: 114 kg (252 lb)   Height: 185.4 cm (73\")     Vitals:    12/03/24 0925   PainSc: 0-No pain          ECOG score: 0           Vitals reviewed.        Lab Results   Component Value Date    HGB 15.3 09/15/2024    HCT 45 09/15/2024    MCV 92 05/16/2024     05/16/2024    WBC 6.0 05/16/2024    NEUTROABS 3.9 05/16/2024    LYMPHSABS 1.4 05/16/2024    MONOSABS 0.5 05/16/2024    EOSABS 0.2 05/16/2024    BASOSABS 0.1 05/16/2024       Lab Results   Component Value Date    GLUCOSE 104 (H) 08/16/2024    " BUN 10 08/16/2024    CREATININE 1.10 09/15/2024     08/16/2024    K 4.1 08/16/2024     08/16/2024    CO2 22 08/16/2024    CALCIUM 9.6 08/16/2024    PROTEINTOT 6.9 03/17/2021    ALBUMIN 4.2 08/16/2024    BILITOT 0.6 08/16/2024    ALKPHOS 64 08/16/2024    AST 18 08/16/2024    ALT 24 08/16/2024             ASSESSMENT & PLAN:  1.  Unprovoked DVT  2.  Reflux  3.  Squamous cell carcinoma of the skin  4.  Cervical herniated disc  5.  History of melanoma scalp  6.  Spinal stenosis with history of laminectomy Dr. Quiros March 2021  7.  Hyperlipidemia  8.  Hypertension  9.  Colonoscopy 2014      -9/15/2024 emergency room note indicates atraumatic pain and swelling left thigh chronic low back pain somewhat radicular down the left lower extremity.  Doppler venous imaging showed left lower extremity DVT involving the proximal mid and distal femoral veins, popliteal, posterior tibial, peroneal, gastrocnemius, and soleal veins.    -11/5/2024 [facility] hematology consult: Patient had unprovoked painful swelling due to DVT mid-September with mild improvement on Xarelto but with significant column of clot necessitating thrombectomy mid October 2024 Dr. Guerra.  Here for hypercoagulable evaluation.  Dr. Guerra plans repeat ultrasound to check for efficacy of thrombectomy and Xarelto full dose and to look for May Thurner or other anatomic causes.  I will see him back in the weeks ahead to go over the results of my testing but, even if these tests are negative, given the magnitude of his clot and the lack of provocation I will keep him on lifetime anticoagulation likely with low-dose Eliquis 2.5 mg twice daily after 6 months of Xarelto full dose.  He has had a colonoscopy in the last couple of years and states that he has had a PSA and digital rectal exam that was benign within the last couple of years.  His melanoma was in 2011.  Did not have staging that they are aware of but just did wide local excision so I assume that  it must been relatively superficial but I would not put him through extensive restaging imaging at this point 13 years out.  If he has antiphospholipid antibody syndrome he would need Coumadin instead of DOAC but parenthetically I would add the ADRIANO this last summer was negative but that does not of course rule out the possibility.    -11/6/2024 hypercoag panel negative  Beta-2 glycoprotein 1/anticardiolipin IgG IgA IgM/lupus anticoagulant all negative  Antithrombin III 95%  Protein C activity 113% antigen 112%  Protein S antigen total 97% free 825% functional 115%  Factor V Leiden and prothrombin gene mutations negative.    - 11/18/2024 Dopplers with Dr. Guerra shows subacute/chronic DVT mid femoral, distal femoral, popliteal, and gastrocnemius veins    -12/3/2024 [facility] hematology follow-up: Negative hypercoagulable workup.  Chronic lower extremity swelling slowly improving.  There is still persistent clot.  Patient reports that Dr. Guerra plans May Thurner study in February and I will see him in March.  If no further instrumentation planned, will switch him to lifetime low-dose Eliquis when he returns.    Total time of care today inclusive of time spent today prior to patient's arrival reviewing interval data and during visit translating to patient putting forth a plan as outlined in after visit instituting this plan took 46 minutes patient care time throughout the day today.  Gerardo Renee MD    12/03/2024

## 2024-12-03 NOTE — PROGRESS NOTES
CHIEF COMPLAINT: Chronic lower extremity swelling    Problem List:  Oncology/Hematology History Overview Note   1.  Unprovoked DVT  2.  Reflux  3.  Squamous cell carcinoma of the skin  4.  Cervical herniated disc  5.  History of melanoma scalp  6.  Spinal stenosis with history of laminectomy Dr. Quiros March 2021  7.  Hyperlipidemia  8.  Hypertension  9.  Colonoscopy 2014      -9/15/2024 emergency room note indicates atraumatic pain and swelling left thigh chronic low back pain somewhat radicular down the left lower extremity.  Doppler venous imaging showed left lower extremity DVT involving the proximal mid and distal femoral veins, popliteal, posterior tibial, peroneal, gastrocnemius, and soleal veins.    -11/5/2024 [facility] consult: Patient had unprovoked painful swelling due to DVT mid-September with mild improvement on Xarelto but with significant column of clot necessitating thrombectomy mid October 2024 Dr. Guerra.  Here for hypercoagulable evaluation.  Dr. Guerra plans repeat ultrasound to check for efficacy of thrombectomy and Xarelto full dose and to look for May Thurner or other anatomic causes.  I will see him back in the weeks ahead to go over the results of my testing but, even if these tests are negative, given the magnitude of his clot and the lack of provocation I will keep him on lifetime anticoagulation likely with low-dose Eliquis 2.5 mg twice daily after 6 months of Xarelto full dose.  He has had a colonoscopy in the last couple of years and states that he has had a PSA and digital rectal exam that was benign within the last couple of years.  His melanoma was in 2011.  Did not have staging that they are aware of but just did wide local excision so I assume that it must been relatively superficial but I would not put him through extensive restaging imaging at this point 13 years out.  If he has antiphospholipid antibody syndrome he would need Coumadin instead of DOAC but  parenthetically I would add the ADRIANO this last summer was negative but that does not of course rule out the possibility.    -11/6/2024 hypercoag panel negative  Beta-2 glycoprotein 1/anticardiolipin IgG IgA IgM/lupus anticoagulant all negative  Antithrombin III 95%  Protein C activity 113% antigen 112%  Protein S antigen total 97% free 825% functional 115%  Factor V Leiden and prothrombin gene mutations negative.    - 11/18/2024 Dopplers with Dr. Guerra shows subacute/chronic DVT mid femoral, distal femoral, popliteal, and gastrocnemius veins    -12/3/2024 [facility] follow-up: Negative hypercoagulable workup.  Chronic lower extremity swelling slowly improving.  There is still persistent clot.  Patient reports that Dr. Guerra plans May Thurner study in February and I will see him in March.  If no further instrumentation planned, will switch him to lifetime low-dose Eliquis when he returns.     Acute deep vein thrombosis (DVT) of femoral vein of left lower extremity       HISTORY OF PRESENT ILLNESS:  The patient is a 69 y.o. male, here for follow up on management of unprovoked DVT with chronic lower extremity swelling improved but persistent on Xarelto full dose    Past Medical History:   Diagnosis Date    Allergic     Arthritis     Bronchitis     Bulging of cervical intervertebral disc 06/21/2018    DVT (deep venous thrombosis)     GERD (gastroesophageal reflux disease)     History of SCC (squamous cell carcinoma) of skin     Hyperlipidemia     Hypertension     Low back pain 1988    Melanoma     Melanoma     scalp    Osteoarthritis 2006    Spinal stenosis 2006    Arthritis and stenosis throughout spine    Wears glasses      Past Surgical History:   Procedure Laterality Date    BACK SURGERY  3/2021    Laminectomy x2    COLONOSCOPY  2014    HERNIA REPAIR  8/2021    LUMBAR LAMINECTOMY DISCECTOMY DECOMPRESSION Bilateral 03/19/2021    Procedure: LUMBAR DECOMPRESSION L3, L4;  Surgeon: Saran Quiros MD;   "Location: [facility] OR;  Service: Neurosurgery;  Laterality: Bilateral;    SKIN CANCER EXCISION      SPINE SURGERY  3/2021    Laminectomy x 2    THROMBECTOMY Left 10/15/2024    Left leg, [facility]       Allergies   Allergen Reactions    Bee Venom Anaphylaxis     Takes shots to prevent    Omeprazole Rash    Protonix [Pantoprazole] Rash     Any of the omeprazole drugs    Sulfa Antibiotics Rash     Pt can't remember reaction       Family History and Social History reviewed and changed as necessary    REVIEW OF SYSTEM:   Other than the chronic lower extremity swelling no new somatic complaints    PHYSICAL EXAM:  Some chronic lower extremity swelling without venous stasis insufficiency changes    Vitals:    12/03/24 0925   BP: 157/81   Pulse: 67   Resp: 18   Temp: 97.8 °F (36.6 °C)   SpO2: 97%   Weight: 114 kg (252 lb)   Height: 185.4 cm (73\")     Vitals:    12/03/24 0925   PainSc: 0-No pain          ECOG score: 0           Vitals reviewed.        Lab Results   Component Value Date    HGB 15.3 09/15/2024    HCT 45 09/15/2024    MCV 92 05/16/2024     05/16/2024    WBC 6.0 05/16/2024    NEUTROABS 3.9 05/16/2024    LYMPHSABS 1.4 05/16/2024    MONOSABS 0.5 05/16/2024    EOSABS 0.2 05/16/2024    BASOSABS 0.1 05/16/2024       Lab Results   Component Value Date    GLUCOSE 104 (H) 08/16/2024    BUN 10 08/16/2024    CREATININE 1.10 09/15/2024     08/16/2024    K 4.1 08/16/2024     08/16/2024    CO2 22 08/16/2024    CALCIUM 9.6 08/16/2024    PROTEINTOT 6.9 03/17/2021    ALBUMIN 4.2 08/16/2024    BILITOT 0.6 08/16/2024    ALKPHOS 64 08/16/2024    AST 18 08/16/2024    ALT 24 08/16/2024             ASSESSMENT & PLAN:  1.  Unprovoked DVT  2.  Reflux  3.  Squamous cell carcinoma of the skin  4.  Cervical herniated disc  5.  History of melanoma scalp  6.  Spinal stenosis with history of laminectomy Dr. Quiros March 2021  7.  Hyperlipidemia  8.  Hypertension  9.  Colonoscopy 2014      -9/15/2024 emergency room note " indicates atraumatic pain and swelling left thigh chronic low back pain somewhat radicular down the left lower extremity.  Doppler venous imaging showed left lower extremity DVT involving the proximal mid and distal femoral veins, popliteal, posterior tibial, peroneal, gastrocnemius, and soleal veins.    -11/5/2024 [facility] consult: Patient had unprovoked painful swelling due to DVT mid-September with mild improvement on Xarelto but with significant column of clot necessitating thrombectomy mid October 2024 Dr. Guerra.  Here for hypercoagulable evaluation.  Dr. Guerra plans repeat ultrasound to check for efficacy of thrombectomy and Xarelto full dose and to look for May Thurner or other anatomic causes.  I will see him back in the weeks ahead to go over the results of my testing but, even if these tests are negative, given the magnitude of his clot and the lack of provocation I will keep him on lifetime anticoagulation likely with low-dose Eliquis 2.5 mg twice daily after 6 months of Xarelto full dose.  He has had a colonoscopy in the last couple of years and states that he has had a PSA and digital rectal exam that was benign within the last couple of years.  His melanoma was in 2011.  Did not have staging that they are aware of but just did wide local excision so I assume that it must been relatively superficial but I would not put him through extensive restaging imaging at this point 13 years out.  If he has antiphospholipid antibody syndrome he would need Coumadin instead of DOAC but parenthetically I would add the ADRIANO this last summer was negative but that does not of course rule out the possibility.    -11/6/2024 hypercoag panel negative  Beta-2 glycoprotein 1/anticardiolipin IgG IgA IgM/lupus anticoagulant all negative  Antithrombin III 95%  Protein C activity 113% antigen 112%  Protein S antigen total 97% free 825% functional 115%  Factor V Leiden and prothrombin gene mutations negative.    -  11/18/2024 Dopplers with Dr. Guerra shows subacute/chronic DVT mid femoral, distal femoral, popliteal, and gastrocnemius veins    -12/3/2024 [facility] follow-up: Negative hypercoagulable workup.  Chronic lower extremity swelling slowly improving.  There is still persistent clot.  Patient reports that Dr. Guerra plans May Thurner study in February and I will see him in March.  If no further instrumentation planned, will switch him to lifetime low-dose Eliquis when he returns.    Total time of care today inclusive of time spent today prior to patient's arrival reviewing interval data and during visit translating to patient putting forth a plan as outlined in after visit instituting this plan took 46 minutes patient care time throughout the day today.  Gerardo Renee MD    12/03/2024

## 2024-12-23 ENCOUNTER — OFFICE VISIT (OUTPATIENT)
Dept: FAMILY MEDICINE CLINIC | Facility: CLINIC | Age: 69
End: 2024-12-23
Payer: MEDICARE

## 2024-12-23 VITALS
SYSTOLIC BLOOD PRESSURE: 116 MMHG | WEIGHT: 253 LBS | OXYGEN SATURATION: 97 % | BODY MASS INDEX: 33.53 KG/M2 | DIASTOLIC BLOOD PRESSURE: 68 MMHG | HEIGHT: 73 IN | HEART RATE: 66 BPM

## 2024-12-23 DIAGNOSIS — I82.412 ACUTE DEEP VEIN THROMBOSIS (DVT) OF FEMORAL VEIN OF LEFT LOWER EXTREMITY: Primary | Chronic | ICD-10-CM

## 2024-12-23 DIAGNOSIS — M51.362 DEGENERATION OF INTERVERTEBRAL DISC OF LUMBAR REGION WITH DISCOGENIC BACK PAIN AND LOWER EXTREMITY PAIN: ICD-10-CM

## 2024-12-23 PROCEDURE — 3078F DIAST BP <80 MM HG: CPT | Performed by: FAMILY MEDICINE

## 2024-12-23 PROCEDURE — 3074F SYST BP LT 130 MM HG: CPT | Performed by: FAMILY MEDICINE

## 2024-12-23 PROCEDURE — 1126F AMNT PAIN NOTED NONE PRSNT: CPT | Performed by: FAMILY MEDICINE

## 2024-12-23 PROCEDURE — 99214 OFFICE O/P EST MOD 30 MIN: CPT | Performed by: FAMILY MEDICINE

## 2024-12-23 RX ORDER — NABUMETONE 500 MG/1
500 TABLET, FILM COATED ORAL 2 TIMES DAILY PRN
Qty: 60 TABLET | Refills: 2 | Status: SHIPPED | OUTPATIENT
Start: 2024-12-23

## 2024-12-23 NOTE — PROGRESS NOTES
Follow Up Office Visit      Date of Visit:  2024   Patient Name: Behzad Peoples  : 1955   MRN: [id number]     Chief Complaint:    Chief Complaint   Patient presents with    Follow-up       History of Present Illness: Behzad Peoples is a 69 y.o. male who is here today for follow up.    History of Present Illness  The patient is a 69-year-old gentleman here for follow-up from a recent procedure.    He has been under the care of Dr. Hendrickson, with his most recent visit involving suture removal after a two-week period post-surgery. He reports that his condition had resolved by the time of the procedure. A recent scan revealed chronic and subacute clotting in the same area. Dr. Renee has suggested a regimen of low-dose Eliquis twice daily. His swelling remains consistent, but he experiences no discomfort during ambulation. He also reports the presence of varicose veins and dryness in his leg, which he does not believe is present in the contralateral leg. Occasionally, he experiences pressure in the area of the varicose veins, particularly under the ankle. He is scheduled for a May-Thurner scan in 2025.    He is currently on Xarelto 20 mg once daily, having initially started on a twice-daily dose of 15 mg. He is seeking refills for lisinopril, famotidine, and Zetia.    He has been experiencing severe arthritis flare-ups in his hand, which have been disrupting his sleep. He is considering a return to nabumetone, as Celebrex has not been effective in managing his symptoms.    MEDICATIONS  Current: Xarelto, lisinopril, famotidine, Zetia, Celebrex      Subjective      Review of Systems:   Review of Systems   Constitutional:  Negative for fatigue and fever.   HENT:  Negative for congestion and ear pain.    Respiratory:  Negative for apnea, cough, chest tightness and shortness of breath.    Cardiovascular:  Negative for chest pain.   Gastrointestinal:  Negative for abdominal pain, constipation,  diarrhea and nausea.   Musculoskeletal:  Positive for arthralgias.   Psychiatric/Behavioral:  Negative for depressed mood and stress.        Past Medical History:   Past Medical History:   Diagnosis Date    Allergic     Arthritis     Bronchitis     Bulging of cervical intervertebral disc 06/21/2018    DVT (deep venous thrombosis)     GERD (gastroesophageal reflux disease)     History of SCC (squamous cell carcinoma) of skin     Hyperlipidemia     Hypertension     Low back pain 1988    Melanoma     Melanoma     scalp    Osteoarthritis 2006    Spinal stenosis 2006    Arthritis and stenosis throughout spine    Wears glasses        Past Surgical History:   Past Surgical History:   Procedure Laterality Date    BACK SURGERY  3/2021    Laminectomy x2    COLONOSCOPY  2014    HERNIA REPAIR  8/2021    LUMBAR LAMINECTOMY DISCECTOMY DECOMPRESSION Bilateral 03/19/2021    Procedure: LUMBAR DECOMPRESSION L3, L4;  Surgeon: Saran Quiros MD;  Location: [facility];  Service: Neurosurgery;  Laterality: Bilateral;    SKIN CANCER EXCISION      SPINE SURGERY  3/2021    Laminectomy x 2    THROMBECTOMY Left 10/15/2024    Left leg, [facility]       Family History:   Family History   Problem Relation Age of Onset    Arthritis Father     Diabetes Father     Hearing loss Father     Hyperlipidemia Father     Thyroid disease Father        Social History:   Social History     Socioeconomic History    Marital status:    Tobacco Use    Smoking status: Never    Smokeless tobacco: Never   Vaping Use    Vaping status: Never Used   Substance and Sexual Activity    Alcohol use: Not Currently     Comment: 1 drink a week    Drug use: No    Sexual activity: Defer       Medications:     Current Outpatient Medications:     celecoxib (CeleBREX) 200 MG capsule, Take 1 capsule by mouth Daily., Disp: , Rfl:     ezetimibe (Zetia) 10 MG tablet, Take 1 tablet by mouth Daily., Disp: 90 tablet, Rfl: 1    famotidine (PEPCID) 40 MG tablet, Take 1 tablet by  "mouth Every Night., Disp: 90 tablet, Rfl: 3    lisinopril-hydrochlorothiazide (PRINZIDE,ZESTORETIC) 20-12.5 MG per tablet, Take 1 tablet by mouth Daily., Disp: 90 tablet, Rfl: 3    multivitamin with minerals tablet tablet, Take 1 tablet by mouth Daily., Disp: , Rfl:     rivaroxaban (XARELTO) 20 MG tablet, Take 1 tablet by mouth Daily. Hold on file, Disp: 30 tablet, Rfl: 5    nabumetone (RELAFEN) 500 MG tablet, Take 1 tablet by mouth 2 (Two) Times a Day As Needed for Mild Pain., Disp: 60 tablet, Rfl: 2    Allergies:   Allergies   Allergen Reactions    Bee Venom Anaphylaxis     Takes shots to prevent    Omeprazole Rash    Protonix [Pantoprazole] Rash     Any of the omeprazole drugs    Sulfa Antibiotics Rash     Pt can't remember reaction       Objective     Physical Exam:  Vital Signs:   Vitals:    12/23/24 1111   BP: 116/68   Pulse: 66   SpO2: 97%   Weight: 115 kg (253 lb)   Height: 185.4 cm (73\")     Body mass index is 33.38 kg/m².     Physical Exam  Vitals and nursing note reviewed.   Constitutional:       General: He is not in acute distress.     Appearance: Normal appearance. He is not ill-appearing.   HENT:      Head: Normocephalic and atraumatic.      Right Ear: Tympanic membrane and ear canal normal.      Left Ear: Tympanic membrane and ear canal normal.      Nose: Nose normal.   Cardiovascular:      Rate and Rhythm: Normal rate and regular rhythm.      Heart sounds: Normal heart sounds.   Pulmonary:      Effort: Pulmonary effort is normal.      Breath sounds: Normal breath sounds.   Neurological:      Mental Status: He is alert and oriented to person, place, and time. Mental status is at baseline.   Psychiatric:         Mood and Affect: Mood normal.       Physical Exam      Procedures    Results  Imaging  Recent scan shows chronic and subacute clotting in the same area.  Assessment / Plan      Assessment/Plan:   Diagnoses and all orders for this visit:    1. Acute deep vein thrombosis (DVT) of femoral vein of " left lower extremity (Primary)    2. Degeneration of intervertebral disc of lumbar region with discogenic back pain and lower extremity pain    Other orders  -     nabumetone (RELAFEN) 500 MG tablet; Take 1 tablet by mouth 2 (Two) Times a Day As Needed for Mild Pain.  Dispense: 60 tablet; Refill: 2       Assessment & Plan  1. Deep Vein Thrombosis (DVT).  The patient has a history of DVT and continues to experience chronic and subacute clotting in the same area. The patient reports no significant swelling or discomfort while walking. The prognosis is favorable, provided he adheres to the prescribed blood thinner regimen. Dr. Renee recommends starting low-dose Eliquis twice a day. The patient will continue to follow up with Dr. Renee to determine if any additional procedures are necessary. If no further procedures are required, he will commence the Eliquis treatment.    2. Medication Management.  The patient will need refills for lisinopril, famotidine, and Zetia, all of which are good until May. He will continue taking Xarelto until March. The patient inquired about increasing the dose of Celebrex, but it was advised against due to potential stomach issues. Instead, nabumetone 500 mg twice daily will be prescribed for one month to manage arthritis flare-ups.    3. Arthritis.  The patient experiences significant arthritis flare-ups, particularly in his hand, which disrupt his sleep. Celebrex has not been effective. Nabumetone 500 mg twice daily will be prescribed for one month. The patient is advised to monitor for any increased bleeding risk due to concurrent use of blood thinners.    PROCEDURE  The patient had sutures removed after a two-week period following surgery.        Follow Up:   No follow-ups on file.    Rigo Mustafa  [facility] [place]     Patient or patient representative verbalized consent for the use of Ambient Listening during the visit with  Rigo Mustafa MD for chart  documentation. 12/23/2024  12:53 EST

## 2024-12-30 ENCOUNTER — OFFICE VISIT (OUTPATIENT)
Dept: FAMILY MEDICINE CLINIC | Facility: CLINIC | Age: 69
End: 2024-12-30
Payer: MEDICARE

## 2024-12-30 VITALS
HEIGHT: 73 IN | SYSTOLIC BLOOD PRESSURE: 138 MMHG | BODY MASS INDEX: 33.4 KG/M2 | OXYGEN SATURATION: 96 % | DIASTOLIC BLOOD PRESSURE: 78 MMHG | HEART RATE: 63 BPM | WEIGHT: 252 LBS

## 2024-12-30 DIAGNOSIS — K59.00 CONSTIPATION, UNSPECIFIED CONSTIPATION TYPE: ICD-10-CM

## 2024-12-30 DIAGNOSIS — J06.9 ACUTE URI: Primary | ICD-10-CM

## 2024-12-30 PROCEDURE — 1160F RVW MEDS BY RX/DR IN RCRD: CPT | Performed by: NURSE PRACTITIONER

## 2024-12-30 PROCEDURE — 99214 OFFICE O/P EST MOD 30 MIN: CPT | Performed by: NURSE PRACTITIONER

## 2024-12-30 PROCEDURE — 3078F DIAST BP <80 MM HG: CPT | Performed by: NURSE PRACTITIONER

## 2024-12-30 PROCEDURE — 3075F SYST BP GE 130 - 139MM HG: CPT | Performed by: NURSE PRACTITIONER

## 2024-12-30 PROCEDURE — 1126F AMNT PAIN NOTED NONE PRSNT: CPT | Performed by: NURSE PRACTITIONER

## 2024-12-30 PROCEDURE — 1159F MED LIST DOCD IN RCRD: CPT | Performed by: NURSE PRACTITIONER

## 2024-12-30 RX ORDER — CLARITHROMYCIN 500 MG/1
500 TABLET ORAL 2 TIMES DAILY
Qty: 20 TABLET | Refills: 0 | Status: SHIPPED | OUTPATIENT
Start: 2024-12-30

## 2024-12-30 NOTE — PROGRESS NOTES
"Chief Complaint  Cough    Subjective          Behzad Peoples presents to [facility]  History of Present Illness  Pt has had cough and congestion x 3 days. He denies fever, chills, or myalgias. He states he gets bronchitis once a year and improves quickly with an antibiotic. He has had constipation for the past few days and has been taking a stool softener.   Cough  Pertinent negatives include no chest pain, fever or shortness of breath.       History of Present Illness      Objective   Vital Signs:   /78   Pulse 63   Ht 185.4 cm (73\")   Wt 114 kg (252 lb)   SpO2 96%   BMI 33.25 kg/m²     Body mass index is 33.25 kg/m².    Review of Systems   Constitutional:  Negative for fatigue and fever.   HENT:  Positive for congestion.    Respiratory:  Positive for cough. Negative for shortness of breath.    Cardiovascular:  Negative for chest pain, palpitations and leg swelling.   Gastrointestinal:  Positive for constipation.   Neurological:  Negative for syncope.   Psychiatric/Behavioral:  The patient is not nervous/anxious.           Current Outpatient Medications:     ezetimibe (Zetia) 10 MG tablet, Take 1 tablet by mouth Daily., Disp: 90 tablet, Rfl: 1    famotidine (PEPCID) 40 MG tablet, Take 1 tablet by mouth Every Night., Disp: 90 tablet, Rfl: 3    lisinopril-hydrochlorothiazide (PRINZIDE,ZESTORETIC) 20-12.5 MG per tablet, Take 1 tablet by mouth Daily., Disp: 90 tablet, Rfl: 3    multivitamin with minerals tablet tablet, Take 1 tablet by mouth Daily., Disp: , Rfl:     nabumetone (RELAFEN) 500 MG tablet, Take 1 tablet by mouth 2 (Two) Times a Day As Needed for Mild Pain., Disp: 60 tablet, Rfl: 2    rivaroxaban (XARELTO) 20 MG tablet, Take 1 tablet by mouth Daily. Hold on file, Disp: 30 tablet, Rfl: 5    celecoxib (CeleBREX) 200 MG capsule, Take 1 capsule by mouth Daily. (Patient not taking: Reported on 12/30/2024), Disp: , Rfl:     clarithromycin (BIAXIN) 500 MG tablet, Take " 1 tablet by mouth 2 (Two) Times a Day., Disp: 20 tablet, Rfl: 0      Allergies: Bee venom, Omeprazole, Protonix [pantoprazole], and Sulfa antibiotics    Physical Exam  Constitutional:       Appearance: Normal appearance.   HENT:      Head: Normocephalic.   Eyes:      Conjunctiva/sclera: Conjunctivae normal.      Pupils: Pupils are equal, round, and reactive to light.   Cardiovascular:      Rate and Rhythm: Normal rate and regular rhythm.      Heart sounds: Normal heart sounds.   Pulmonary:      Effort: Pulmonary effort is normal.      Breath sounds: Normal breath sounds.   Abdominal:      Tenderness: There is no abdominal tenderness.   Musculoskeletal:         General: Normal range of motion.   Skin:     General: Skin is warm and dry.      Capillary Refill: Capillary refill takes less than 2 seconds.   Neurological:      General: No focal deficit present.      Mental Status: He is alert and oriented to person, place, and time.   Psychiatric:         Mood and Affect: Mood normal.         Behavior: Behavior normal.         Thought Content: Thought content normal.         Judgment: Judgment normal.          Physical Exam         Result Review :                Results            Assessment and Plan    Diagnoses and all orders for this visit:    1. Acute URI (Primary)  Comments:  Increase fluids. Mucinex and Zyrtec PRN. Finish antibiotics if not improving in a few days. RTC for worsened sx.  Orders:  -     clarithromycin (BIAXIN) 500 MG tablet; Take 1 tablet by mouth 2 (Two) Times a Day.  Dispense: 20 tablet; Refill: 0    2. Constipation, unspecified constipation type  Comments:  Miralax PRN and continue stool softeners.                  Follow Up   Return in about 1 week (around 1/6/2025) for if not improving or sooner if symptoms worsen.  Patient was given instructions and counseling regarding his condition or for health maintenance advice. Please see specific information pulled into the AVS if appropriate.     [place]ada  NANCY Hubbard, APRN

## 2025-03-11 ENCOUNTER — OFFICE VISIT (OUTPATIENT)
Dept: ONCOLOGY | Facility: CLINIC | Age: 70
End: 2025-03-11
Payer: MEDICARE

## 2025-03-11 VITALS
HEIGHT: 73 IN | BODY MASS INDEX: 33.53 KG/M2 | OXYGEN SATURATION: 97 % | HEART RATE: 60 BPM | RESPIRATION RATE: 18 BRPM | DIASTOLIC BLOOD PRESSURE: 74 MMHG | TEMPERATURE: 98.2 F | SYSTOLIC BLOOD PRESSURE: 144 MMHG | WEIGHT: 253 LBS

## 2025-03-11 DIAGNOSIS — I82.412 ACUTE DEEP VEIN THROMBOSIS (DVT) OF FEMORAL VEIN OF LEFT LOWER EXTREMITY: Primary | Chronic | ICD-10-CM

## 2025-03-11 NOTE — LETTER
March 11, 2025     Rigo Mustafa MD  [address] [facility] [place] [address]    Patient: Behzad Peoples   YOB: 1955   Date of Visit: 3/11/2025     Dear Rigo Mustafa MD:       Thank you for referring Behzad Peoples to me for evaluation. Below are the relevant portions of my assessment and plan of care.    If you have questions, please do not hesitate to call me. I look forward to following Behzad along with you.         Sincerely,        Gerardo Renee MD        CC: MD Derek Ty APRN [employer], MD Hicks, Lee G, MD  03/11/25 1044  Signed  CHIEF COMPLAINT: Chronic left lower extremity swelling slowly improving but persistent with some right thigh pain as well    Problem List:  Oncology/Hematology History Overview Note   1.  Unprovoked DVT  2.  Reflux  3.  Squamous cell carcinoma of the skin  4.  Cervical herniated disc  5.  History of melanoma scalp  6.  Spinal stenosis with history of laminectomy Dr. Quiros March 2021  7.  Hyperlipidemia  8.  Hypertension  9.  Colonoscopy 2014      -9/15/2024 emergency room note indicates atraumatic pain and swelling left thigh chronic low back pain somewhat radicular down the left lower extremity.  Doppler venous imaging showed left lower extremity DVT involving the proximal mid and distal femoral veins, popliteal, posterior tibial, peroneal, gastrocnemius, and soleal veins.    -11/5/2024 [facility] consult: Patient had unprovoked painful swelling due to DVT mid-September with mild improvement on Xarelto but with significant column of clot necessitating thrombectomy mid October 2024 Dr. Guerra.  Here for hypercoagulable evaluation.  Dr. Guerra plans repeat ultrasound to check for efficacy of thrombectomy and Xarelto full dose and to look for May Thurner or other anatomic causes.  I will see him back in the weeks ahead to go over the results of my testing but, even if these tests are negative, given the  magnitude of his clot and the lack of provocation I will keep him on lifetime anticoagulation likely with low-dose Eliquis 2.5 mg twice daily after 6 months of Xarelto full dose.  He has had a colonoscopy in the last couple of years and states that he has had a PSA and digital rectal exam that was benign within the last couple of years.  His melanoma was in 2011.  Did not have staging that they are aware of but just did wide local excision so I assume that it must been relatively superficial but I would not put him through extensive restaging imaging at this point 13 years out.  If he has antiphospholipid antibody syndrome he would need Coumadin instead of DOAC but parenthetically I would add the ADRIANO this last summer was negative but that does not of course rule out the possibility.    -11/6/2024 hypercoag panel negative  Beta-2 glycoprotein 1/anticardiolipin IgG IgA IgM/lupus anticoagulant all negative  Antithrombin III 95%  Protein C activity 113% antigen 112%  Protein S antigen total 97% free 825% functional 115%  Factor V Leiden and prothrombin gene mutations negative.    - 11/18/2024 Dopplers with Dr. Guerra shows subacute/chronic DVT mid femoral, distal femoral, popliteal, and gastrocnemius veins    -12/3/2024 [facility] follow-up: Negative hypercoagulable workup.  Chronic lower extremity swelling slowly improving.  There is still persistent clot.  Patient reports that Dr. Guerra plans May Thurner study in February and I will see him in March.  If no further instrumentation planned, will switch him to lifetime low-dose Eliquis when he returns.    -2/17/2025 duplex aorta inferior vena cava iliac vasculature shows patent IVC, CIV, EIV, CFV bilateral.  Chronic thrombus mid femoral vein and popliteal vein with contralateral patent CFV.  Vascular surgeon recommended continued compression, elevation, exercise left lower extremity and to see him on an annual basis with repeat venous duplex.  Given that clot  developed after only a short car ride, lifelong anticoagulation likely necessary.    -3/11/2025 [facility] follow-up: With essentially unprovoked DVT with chronic DVT and symptoms thereof, I would keep on lifetime anticoagulation but we will try low-dose Eliquis but he knows that if symptoms worsen he needs to get back to vascular surgery for repeat ultrasound and he may, if this fails, have to go back to full dose Eliquis.  I have sent him enough refills of the Eliquis 2.5 mg twice daily for 6 months worth and beyond that we will leave prescriptions to Dr. Mustafa.  He does not need formal follow-up with hematology.  He has follow-up with vascular surgery in a year.     Acute deep vein thrombosis (DVT) of femoral vein of left lower extremity       HISTORY OF PRESENT ILLNESS:  The patient is a 69 y.o. male, here for follow up on management of unprovoked now chronic DVT left lower extremity    Past Medical History:   Diagnosis Date   • Allergic    • Arthritis    • Bronchitis    • Bulging of cervical intervertebral disc 06/21/2018   • DVT (deep venous thrombosis)    • GERD (gastroesophageal reflux disease)    • History of SCC (squamous cell carcinoma) of skin    • Hyperlipidemia    • Hypertension    • Low back pain 1988   • Melanoma    • Melanoma     scalp   • Osteoarthritis 2006   • Spinal stenosis 2006    Arthritis and stenosis throughout spine   • Wears glasses      Past Surgical History:   Procedure Laterality Date   • BACK SURGERY  3/2021    Laminectomy x2   • COLONOSCOPY  2014   • HERNIA REPAIR  8/2021   • LUMBAR LAMINECTOMY DISCECTOMY DECOMPRESSION Bilateral 03/19/2021    Procedure: LUMBAR DECOMPRESSION L3, L4;  Surgeon: Saran Quiros MD;  Location: [facility];  Service: Neurosurgery;  Laterality: Bilateral;   • SKIN CANCER EXCISION     • SPINE SURGERY  3/2021    Laminectomy x 2   • THROMBECTOMY Left 10/15/2024    Left leg, [facility]       Allergies   Allergen Reactions   • Bee Venom Anaphylaxis     " Takes shots to prevent   • Omeprazole Rash   • Protonix [Pantoprazole] Rash     Any of the omeprazole drugs   • Sulfa Antibiotics Rash     Pt can't remember reaction       Family History and Social History reviewed and changed as necessary    REVIEW OF SYSTEM:   No new somatic complaints but with persistent albeit improved left lower extremity swelling and some right thigh discomfort with swelling    PHYSICAL EXAM:  Left greater than right lower extremity swelling with 2+ pretibial edema on the left.    Vitals:    03/11/25 0930   BP: 144/74   Pulse: 60   Resp: 18   Temp: 98.2 °F (36.8 °C)   SpO2: 97%   Weight: 115 kg (253 lb)   Height: 185.4 cm (73\")     Vitals:    03/11/25 0930   PainSc: 0-No pain          ECOG score: 0           Vitals reviewed.    ECOG: (0) Fully Active - Able to Carry On All Pre-disease Performance Without Restriction    Lab Results   Component Value Date    HGB 15.3 09/15/2024    HCT 45 09/15/2024    MCV 92 05/16/2024     05/16/2024    WBC 6.0 05/16/2024    NEUTROABS 3.9 05/16/2024    LYMPHSABS 1.4 05/16/2024    MONOSABS 0.5 05/16/2024    EOSABS 0.2 05/16/2024    BASOSABS 0.1 05/16/2024       Lab Results   Component Value Date    GLUCOSE 104 (H) 08/16/2024    BUN 10 08/16/2024    CREATININE 1.10 09/15/2024     08/16/2024    K 4.1 08/16/2024     08/16/2024    CO2 22 08/16/2024    CALCIUM 9.6 08/16/2024    PROTEINTOT 6.7 08/16/2024    ALBUMIN 4.2 08/16/2024    BILITOT 0.6 08/16/2024    ALKPHOS 64 08/16/2024    AST 18 08/16/2024    ALT 24 08/16/2024             ASSESSMENT & PLAN:  1.  Unprovoked DVT  2.  Reflux  3.  Squamous cell carcinoma of the skin  4.  Cervical herniated disc  5.  History of melanoma scalp  6.  Spinal stenosis with history of laminectomy Dr. Quiros March 2021  7.  Hyperlipidemia  8.  Hypertension  9.  Colonoscopy 2014      -9/15/2024 emergency room note indicates atraumatic pain and swelling left thigh chronic low back pain somewhat radicular down the left " lower extremity.  Doppler venous imaging showed left lower extremity DVT involving the proximal mid and distal femoral veins, popliteal, posterior tibial, peroneal, gastrocnemius, and soleal veins.    -11/5/2024 [facility] consult: Patient had unprovoked painful swelling due to DVT mid-September with mild improvement on Xarelto but with significant column of clot necessitating thrombectomy mid October 2024 Dr. Guerra.  Here for hypercoagulable evaluation.  Dr. Guerra plans repeat ultrasound to check for efficacy of thrombectomy and Xarelto full dose and to look for May Thurner or other anatomic causes.  I will see him back in the weeks ahead to go over the results of my testing but, even if these tests are negative, given the magnitude of his clot and the lack of provocation I will keep him on lifetime anticoagulation likely with low-dose Eliquis 2.5 mg twice daily after 6 months of Xarelto full dose.  He has had a colonoscopy in the last couple of years and states that he has had a PSA and digital rectal exam that was benign within the last couple of years.  His melanoma was in 2011.  Did not have staging that they are aware of but just did wide local excision so I assume that it must been relatively superficial but I would not put him through extensive restaging imaging at this point 13 years out.  If he has antiphospholipid antibody syndrome he would need Coumadin instead of DOAC but parenthetically I would add the ADRIANO this last summer was negative but that does not of course rule out the possibility.    -11/6/2024 hypercoag panel negative  Beta-2 glycoprotein 1/anticardiolipin IgG IgA IgM/lupus anticoagulant all negative  Antithrombin III 95%  Protein C activity 113% antigen 112%  Protein S antigen total 97% free 825% functional 115%  Factor V Leiden and prothrombin gene mutations negative.    - 11/18/2024 Dopplers with Dr. Guerra shows subacute/chronic DVT mid femoral, distal femoral, popliteal, and  gastrocnemius veins    -12/3/2024 [facility] follow-up: Negative hypercoagulable workup.  Chronic lower extremity swelling slowly improving.  There is still persistent clot.  Patient reports that Dr. Guerra plans May Thurner study in February and I will see him in March.  If no further instrumentation planned, will switch him to lifetime low-dose Eliquis when he returns.    -2/17/2025 duplex aorta inferior vena cava iliac vasculature shows patent IVC, CIV, EIV, CFV bilateral.  Chronic thrombus mid femoral vein and popliteal vein with contralateral patent CFV.  Vascular surgeon recommended continued compression, elevation, exercise left lower extremity and to see him on an annual basis with repeat venous duplex.  Given that clot developed after only a short car ride, lifelong anticoagulation likely necessary per vascular surgery.    -3/11/2025 [facility] follow-up: With essentially unprovoked DVT with chronic DVT and symptoms thereof, I would keep on lifetime anticoagulation but we will try low-dose Eliquis but he knows that if symptoms worsen he needs to get back to vascular surgery for repeat ultrasound and he may, if this fails, have to go back to full dose Eliquis.  I have sent him enough refills of the Eliquis 2.5 mg twice daily for 6 months worth and beyond that we will leave prescriptions to Dr. Mustafa.  He does not need formal follow-up with hematology.  He has follow-up with vascular surgery in a year.    Total time of care today inclusive of time spent today prior to his arrival reviewing interval data and notes and imaging by vascular surgery and during visit interviewing and [place] signs or symptoms of his disease and management thereof and after visit sending prescriptions and communications took 50 minutes patient care time throughout the day today.  Gerardo Renee MD    03/11/2025

## 2025-03-11 NOTE — PROGRESS NOTES
CHIEF COMPLAINT: Chronic left lower extremity swelling slowly improving but persistent with some right thigh pain as well    Problem List:  Oncology/Hematology History Overview Note   1.  Unprovoked DVT  2.  Reflux  3.  Squamous cell carcinoma of the skin  4.  Cervical herniated disc  5.  History of melanoma scalp  6.  Spinal stenosis with history of laminectomy Dr. Quiros March 2021  7.  Hyperlipidemia  8.  Hypertension  9.  Colonoscopy 2014      -9/15/2024 emergency room note indicates atraumatic pain and swelling left thigh chronic low back pain somewhat radicular down the left lower extremity.  Doppler venous imaging showed left lower extremity DVT involving the proximal mid and distal femoral veins, popliteal, posterior tibial, peroneal, gastrocnemius, and soleal veins.    -11/5/2024 [facility] consult: Patient had unprovoked painful swelling due to DVT mid-September with mild improvement on Xarelto but with significant column of clot necessitating thrombectomy mid October 2024 Dr. Guerra.  Here for hypercoagulable evaluation.  Dr. Guerra plans repeat ultrasound to check for efficacy of thrombectomy and Xarelto full dose and to look for May Thurner or other anatomic causes.  I will see him back in the weeks ahead to go over the results of my testing but, even if these tests are negative, given the magnitude of his clot and the lack of provocation I will keep him on lifetime anticoagulation likely with low-dose Eliquis 2.5 mg twice daily after 6 months of Xarelto full dose.  He has had a colonoscopy in the last couple of years and states that he has had a PSA and digital rectal exam that was benign within the last couple of years.  His melanoma was in 2011.  Did not have staging that they are aware of but just did wide local excision so I assume that it must been relatively superficial but I would not put him through extensive restaging imaging at this point 13 years out.  If he has antiphospholipid  antibody syndrome he would need Coumadin instead of DOAC but parenthetically I would add the ADRIANO this last summer was negative but that does not of course rule out the possibility.    -11/6/2024 hypercoag panel negative  Beta-2 glycoprotein 1/anticardiolipin IgG IgA IgM/lupus anticoagulant all negative  Antithrombin III 95%  Protein C activity 113% antigen 112%  Protein S antigen total 97% free 825% functional 115%  Factor V Leiden and prothrombin gene mutations negative.    - 11/18/2024 Dopplers with Dr. Guerra shows subacute/chronic DVT mid femoral, distal femoral, popliteal, and gastrocnemius veins    -12/3/2024 [facility] follow-up: Negative hypercoagulable workup.  Chronic lower extremity swelling slowly improving.  There is still persistent clot.  Patient reports that Dr. Guerra plans May Thurner study in February and I will see him in March.  If no further instrumentation planned, will switch him to lifetime low-dose Eliquis when he returns.    -2/17/2025 duplex aorta inferior vena cava iliac vasculature shows patent IVC, CIV, EIV, CFV bilateral.  Chronic thrombus mid femoral vein and popliteal vein with contralateral patent CFV.  Vascular surgeon recommended continued compression, elevation, exercise left lower extremity and to see him on an annual basis with repeat venous duplex.  Given that clot developed after only a short car ride, lifelong anticoagulation likely necessary.    -3/11/2025 [facility] follow-up: With essentially unprovoked DVT with chronic DVT and symptoms thereof, I would keep on lifetime anticoagulation but we will try low-dose Eliquis but he knows that if symptoms worsen he needs to get back to vascular surgery for repeat ultrasound and he may, if this fails, have to go back to full dose Eliquis.  I have sent him enough refills of the Eliquis 2.5 mg twice daily for 6 months worth and beyond that we will leave prescriptions to Dr. Mustafa.  He does not need formal  follow-up with hematology.  He has follow-up with vascular surgery in a year.     Acute deep vein thrombosis (DVT) of femoral vein of left lower extremity       HISTORY OF PRESENT ILLNESS:  The patient is a 69 y.o. male, here for follow up on management of unprovoked now chronic DVT left lower extremity    Past Medical History:   Diagnosis Date    Allergic     Arthritis     Bronchitis     Bulging of cervical intervertebral disc 06/21/2018    DVT (deep venous thrombosis)     GERD (gastroesophageal reflux disease)     History of SCC (squamous cell carcinoma) of skin     Hyperlipidemia     Hypertension     Low back pain 1988    Melanoma     Melanoma     scalp    Osteoarthritis 2006    Spinal stenosis 2006    Arthritis and stenosis throughout spine    Wears glasses      Past Surgical History:   Procedure Laterality Date    BACK SURGERY  3/2021    Laminectomy x2    COLONOSCOPY  2014    HERNIA REPAIR  8/2021    LUMBAR LAMINECTOMY DISCECTOMY DECOMPRESSION Bilateral 03/19/2021    Procedure: LUMBAR DECOMPRESSION L3, L4;  Surgeon: Saran Quiros MD;  [facility];  Service: Neurosurgery;  Laterality: Bilateral;    SKIN CANCER EXCISION      SPINE SURGERY  3/2021    Laminectomy x 2    THROMBECTOMY Left 10/15/2024    Left leg, [facility]       Allergies   Allergen Reactions    Bee Venom Anaphylaxis     Takes shots to prevent    Omeprazole Rash    Protonix [Pantoprazole] Rash     Any of the omeprazole drugs    Sulfa Antibiotics Rash     Pt can't remember reaction       Family History and Social History reviewed and changed as necessary    REVIEW OF SYSTEM:   No new somatic complaints but with persistent albeit improved left lower extremity swelling and some right thigh discomfort with swelling    PHYSICAL EXAM:  Left greater than right lower extremity swelling with 2+ pretibial edema on the left.    Vitals:    03/11/25 0930   BP: 144/74   Pulse: 60   Resp: 18   Temp: 98.2 °F (36.8 °C)   SpO2: 97%   Weight: 115 kg (253  "lb)   Height: 185.4 cm (73\")     Vitals:    03/11/25 0930   PainSc: 0-No pain          ECOG score: 0           Vitals reviewed.    ECOG: (0) Fully Active - Able to Carry On All Pre-disease Performance Without Restriction    Lab Results   Component Value Date    HGB 15.3 09/15/2024    HCT 45 09/15/2024    MCV 92 05/16/2024     05/16/2024    WBC 6.0 05/16/2024    NEUTROABS 3.9 05/16/2024    LYMPHSABS 1.4 05/16/2024    MONOSABS 0.5 05/16/2024    EOSABS 0.2 05/16/2024    BASOSABS 0.1 05/16/2024       Lab Results   Component Value Date    GLUCOSE 104 (H) 08/16/2024    BUN 10 08/16/2024    CREATININE 1.10 09/15/2024     08/16/2024    K 4.1 08/16/2024     08/16/2024    CO2 22 08/16/2024    CALCIUM 9.6 08/16/2024    PROTEINTOT 6.7 08/16/2024    ALBUMIN 4.2 08/16/2024    BILITOT 0.6 08/16/2024    ALKPHOS 64 08/16/2024    AST 18 08/16/2024    ALT 24 08/16/2024             ASSESSMENT & PLAN:  1.  Unprovoked DVT  2.  Reflux  3.  Squamous cell carcinoma of the skin  4.  Cervical herniated disc  5.  History of melanoma scalp  6.  Spinal stenosis with history of laminectomy Dr. Quiros March 2021  7.  Hyperlipidemia  8.  Hypertension  9.  Colonoscopy 2014      -9/15/2024 emergency room note indicates atraumatic pain and swelling left thigh chronic low back pain somewhat radicular down the left lower extremity.  Doppler venous imaging showed left lower extremity DVT involving the proximal mid and distal femoral veins, popliteal, posterior tibial, peroneal, gastrocnemius, and soleal veins.    -11/5/2024 [facility] consult: Patient had unprovoked painful swelling due to DVT mid-September with mild improvement on Xarelto but with significant column of clot necessitating thrombectomy mid October 2024 Dr. Guerra.  Here for hypercoagulable evaluation.  Dr. Guerra plans repeat ultrasound to check for efficacy of thrombectomy and Xarelto full dose and to look for May Thurner or other anatomic causes.  I will see " him back in the weeks ahead to go over the results of my testing but, even if these tests are negative, given the magnitude of his clot and the lack of provocation I will keep him on lifetime anticoagulation likely with low-dose Eliquis 2.5 mg twice daily after 6 months of Xarelto full dose.  He has had a colonoscopy in the last couple of years and states that he has had a PSA and digital rectal exam that was benign within the last couple of years.  His melanoma was in 2011.  Did not have staging that they are aware of but just did wide local excision so I assume that it must been relatively superficial but I would not put him through extensive restaging imaging at this point 13 years out.  If he has antiphospholipid antibody syndrome he would need Coumadin instead of DOAC but parenthetically I would add the ADRIANO this last summer was negative but that does not of course rule out the possibility.    -11/6/2024 hypercoag panel negative  Beta-2 glycoprotein 1/anticardiolipin IgG IgA IgM/lupus anticoagulant all negative  Antithrombin III 95%  Protein C activity 113% antigen 112%  Protein S antigen total 97% free 825% functional 115%  Factor V Leiden and prothrombin gene mutations negative.    - 11/18/2024 Dopplers with Dr. Guerra shows subacute/chronic DVT mid femoral, distal femoral, popliteal, and gastrocnemius veins    -12/3/2024 [facility] follow-up: Negative hypercoagulable workup.  Chronic lower extremity swelling slowly improving.  There is still persistent clot.  Patient reports that Dr. Guerra plans May Thurner study in February and I will see him in March.  If no further instrumentation planned, will switch him to lifetime low-dose Eliquis when he returns.    -2/17/2025 duplex aorta inferior vena cava iliac vasculature shows patent IVC, CIV, EIV, CFV bilateral.  Chronic thrombus mid femoral vein and popliteal vein with contralateral patent CFV.  Vascular surgeon recommended continued compression,  elevation, exercise left lower extremity and to see him on an annual basis with repeat venous duplex.  Given that clot developed after only a short car ride, lifelong anticoagulation likely necessary per vascular surgery.    -3/11/2025 [facility] follow-up: With essentially unprovoked DVT with chronic DVT and symptoms thereof, I would keep on lifetime anticoagulation but we will try low-dose Eliquis but he knows that if symptoms worsen he needs to get back to vascular surgery for repeat ultrasound and he may, if this fails, have to go back to full dose Eliquis.  I have sent him enough refills of the Eliquis 2.5 mg twice daily for 6 months worth and beyond that we will leave prescriptions to Dr. Mustafa.  He does not need formal follow-up with hematology.  He has follow-up with vascular surgery in a year.    Total time of care today inclusive of time spent today prior to his arrival reviewing interval data and notes and imaging by vascular surgery and during visit interviewing and [place] signs or symptoms of his disease and management thereof and after visit sending prescriptions and communications took 50 minutes patient care time throughout the day today.  Gerardo Renee MD    03/11/2025

## 2025-03-14 ENCOUNTER — OFFICE VISIT (OUTPATIENT)
Dept: PAIN MEDICINE | Facility: CLINIC | Age: 70
End: 2025-03-14
Payer: MEDICARE

## 2025-03-14 VITALS — WEIGHT: 252 LBS | HEIGHT: 73 IN | BODY MASS INDEX: 33.4 KG/M2

## 2025-03-14 DIAGNOSIS — M54.16 LUMBAR RADICULOPATHY: ICD-10-CM

## 2025-03-14 DIAGNOSIS — M96.1 POSTLAMINECTOMY SYNDROME: Primary | ICD-10-CM

## 2025-03-14 NOTE — PROGRESS NOTES
Referring Physician: No referring provider defined for this encounter.    Primary Physician: Rigo Mustafa MD    CHIEF COMPLAINT or REASON FOR VISIT: Back Pain and Follow-up      Initial history of present illness on 02/09/2024:  Mr. Behzad Peoples is 69 y.o. male who presents as a new patient referral for evaluation treatment of chronic axial low back pain with radiation to bilateral lower extremities and feet as well as a 6-month history of left-sided low back pain with radiation to the buttock.  Mr. Peoples has past medical history of lumbar laminectomy with Dr. Quiros several years ago.  At that time he had been experiencing significant problems with motor dysfunction and difficulty ambulating.  The symptoms resolved with surgery however he continued to have low back pain and bilateral foot numbness and tingling especially with prolonged standing ambulation.  On the last 6 months he has noticed worsening of his left-sided low back pain again with ambulation and prolonged standing.  He sought consultation with Dr. Workman, neurosurgery, who identified a very low-grade L4-5 listhesis which appears to be dynamic on x-ray.  Patient and wife would very much like to avoid lumbar fusion or further surgery.  Patient denies any bowel or bladder dysfunction, lower extremity weakness, new onset saddle anesthesia or unexplained weight loss.   He did have epidural steroid injections prior to his last surgery with variable effect.    Interval history:   Patient returns to clinic today.  He continues to have pain relief from his last injection.  He has some back discomfort but he states that this is to be expected.  Overall, he is doing well.  His Xarelto was just switched to Eliquis for a left lower extremity DVT which necessitated thrombectomy back in October 2024.  He has been evaluated by hematology oncology, Dr. Renee, but the patient reports they have not identified any etiology for these blood  clots.  .      Interventions:  2/15/2024: Left L3/4 and L4/5 TFESI with 100% relief for 4 months  7/16/2024: Repeat left L3/4 and L4/5 TFESI with 75% relief ongoing    Objective Pain Scoring:   BRIEF PAIN INVENTORY:  Total score:   Pain Score    03/14/25 1103   PainSc: 0-No pain   PainLoc: Back          PHQ-2:    PHQ-9:    Opioid Risk Tool:         Review of Systems:   ROS negative except as otherwise noted     Past Medical History:   Past Medical History:   Diagnosis Date    Allergic     Arthritis     Bronchitis     Bulging of cervical intervertebral disc 06/21/2018    DVT (deep venous thrombosis)     GERD (gastroesophageal reflux disease)     History of SCC (squamous cell carcinoma) of skin     Hyperlipidemia     Hypertension     Joint pain 2006    Low back pain 1988    Melanoma     Melanoma     scalp    Osteoarthritis 2006    Spinal stenosis 2006    Arthritis and stenosis throughout spine    Wears glasses          Past Surgical History:   Past Surgical History:   Procedure Laterality Date    BACK SURGERY  3/2021    Laminectomy x2    COLONOSCOPY  2014    HERNIA REPAIR  8/2021    LAMINECTOMY  03/2021    LUMBAR LAMINECTOMY DISCECTOMY DECOMPRESSION Bilateral 03/19/2021    Procedure: LUMBAR DECOMPRESSION L3, L4;  Surgeon: Saran Quiros MD;  Location: [facility];  Service: Neurosurgery;  Laterality: Bilateral;    SKIN CANCER EXCISION      SPINE SURGERY  3/2021    Laminectomy x 2    THROMBECTOMY Left 10/15/2024    Left leg, [facility]         Family History   Family History   Problem Relation Age of Onset    Arthritis Father     Diabetes Father     Hearing loss Father     Hyperlipidemia Father     Thyroid disease Father     Hypertension Father          Social History   Social History     Socioeconomic History    Marital status:    Tobacco Use    Smoking status: Never    Smokeless tobacco: Never   Vaping Use    Vaping status: Never Used   Substance and Sexual Activity    Alcohol use: Not Currently     Comment:  "1 drink a week    Drug use: No    Sexual activity: Defer        Medications:     Current Outpatient Medications:     apixaban (ELIQUIS) 2.5 MG tablet tablet, Take 1 tablet by mouth 2 (Two) Times a Day., Disp: 60 tablet, Rfl: 5    ezetimibe (Zetia) 10 MG tablet, Take 1 tablet by mouth Daily., Disp: 90 tablet, Rfl: 1    famotidine (PEPCID) 40 MG tablet, Take 1 tablet by mouth Every Night., Disp: 90 tablet, Rfl: 3    lisinopril-hydrochlorothiazide (PRINZIDE,ZESTORETIC) 20-12.5 MG per tablet, Take 1 tablet by mouth Daily., Disp: 90 tablet, Rfl: 3    multivitamin with minerals tablet tablet, Take 1 tablet by mouth Daily., Disp: , Rfl:     nabumetone (RELAFEN) 500 MG tablet, Take 1 tablet by mouth 2 (Two) Times a Day As Needed for Mild Pain., Disp: 60 tablet, Rfl: 2        Physical Exam:     Vitals:    03/14/25 1103   Weight: 114 kg (252 lb)   Height: 185.4 cm (72.99\")   PainSc: 0-No pain   PainLoc: Back        General: Alert and oriented, No acute distress.   HEENT: Normocephalic, atraumatic.   Cardiovascular: No gross edema  Respiratory: Respirations are non-labored    Lumbar Spine:   No masses or atrophy  Range of motion - Flexion normal. Extension normal.    Facet Loading: Negative bilaterally  Facet Palpation - Nontender   PSIS tenderness - Negative bilaterally  Derek's/KATIE/Thigh thrust -   Straight leg raise/slump test: Negative bilaterally      Motor Exam:        Strength: Rate on 1-5 scale Right Left    L1/2- hip flexion 5/5  5/5    L3- knee extension 5/5  5/5    L4- ankle dorsiflexion 5/5  5/5    L5- great toe extension 5/5  5/5    S1- ankle plantarflexion 5/5  5/5    Sensory Exam: Full and equal sensation to light touch throughout.  Neurologic: Cranial Nerves II-XII are grossly intact.   Psychiatric: Cooperative.   Gait: Antalgic flexed forward  Assistive Devices: None      Imaging Studies:   Results for orders placed during the hospital encounter of 10/06/23    MRI Lumbar Spine Without " Contrast    Narrative  MRI LUMBAR SPINE WO CONTRAST    Date of Exam: 10/6/2023 2:23 PM EDT    Indication: low back pain.    Comparison: Radiographs 8/21/2023.    Technique:  Routine multiplanar/multisequence sequence images of the lumbar spine were obtained without contrast administration.    Findings:  There is evidence of prior posterior decompression at L3-4 and L4-5. T1 marrow signal is preserved, without evidence of fracture or suspicious marrow replacing lesion. There is mild grade 1 anterolisthesis of L3 on L4 and L4 on L5. The conus medullaris  and cauda equina nerve roots are satisfactory in appearance. The paraspinal soft tissues demonstrate no acute or suspicious findings. Multilevel spondylosis is present, with areas of involvement including    L1-2, no significant spinal canal or neuroforaminal impingement.    L2-3, small disc bulge and bilateral facet arthropathy with some ligamentum flavum thickening. There is moderate spinal canal narrowing and moderate to severe bilateral neuroforaminal stenosis, greater on the left.    L3-4, disc bulge is present, eccentric to the left. The spinal canal is patent following operative decompression. There is severe left and mild right neuroforaminal narrowing.    L4-5, postoperative changes with circumferential disc bulge and bilateral facet arthropathy present. The spinal canal remains decompressed. There is moderate to severe left and mild right neuroforaminal narrowing.    L5-S1, small disc bulge and bilateral facet arthropathy. The spinal canal is patent. There is moderate bilateral neuroforaminal narrowing, slightly worse on the left.    Impression  Impression:  Patent spinal canal at the previously decompressed L3-4 and L4-5 levels. These levels demonstrate some listhesis and areas of disc bulge with resultant severe narrowing at the left neural foramen at each level. There is also some moderate spinal canal  narrowing and moderate to severe bilateral  neuroforaminal stenosis at the adjacent L2-3 level.    Electronically Signed: Thanh Field MD  10/6/2023 4:28 PM EDT  Workstation ID: [id number]      Results for orders placed during the hospital encounter of 12/07/20    MRI Lumbar Spine Without Contrast    Narrative  EXAMINATION: MRI LUMBAR SPINE WO CONTRAST-    INDICATION: Back and leg pain after fall; M43.16-Spondylolisthesis,  lumbar region; M48.062-Spinal stenosis, lumbar region with neurogenic  claudication; right leg pain and weakness.    TECHNIQUE: Routine multiplanar imaging was obtained of the lumbar spine  without the administration of gadolinium contrast.    COMPARISON: None.    FINDINGS: There is disc desiccation seen at the L2-L3, L3-L4, L4-L5 and  L5-S1 levels. Mild anterior spurring and posterior osteophyte formation  identified. There is mild anterolisthesis of L3 on L4. Normal signal  intensity within the conus. The spinal cord terminates at the L1 level.  The facets are well aligned. No abnormal mass or fluid collection is  seen within the paraspinal muscles.    Axial imaging reveals at the L1-L2 level with no significant central  spinal canal stenosis. No neural foraminal narrowing or nerve root  compromise.    At the L2-L3 level, there is a broad-based disc bulge with some  lateralization to the left. There is moderate narrowing of the left  neuroforamina. Mild to moderate central spinal canal stenosis. Nerve  root contact on the left cannot be excluded.    At the L3-L4 level, there is a broad-based disc bulge with  lateralization to the left. Severe narrowing of the left neuroforamina.  Moderate to severe central spinal canal stenosis with degenerative  changes seen in the posterior facets and thickening of the posterior  ligamentum flavum.    At the L4-L5 level, there is a broad-based disc bulge identified with  some lateralization to the right creating moderate to severe right  neuroforaminal narrowing and possible nerve root contact on the  right.  Moderate to severe central spinal canal stenosis.    At the L5-S1 level, there is a broad-based disc bulge creating narrowing  of the neuroforamina bilaterally right greater than left. Nerve root  contact on the right cannot be excluded.    Impression  Multilevel degenerative changes identified diffusely  throughout the lumbar spine from L2 through S1. There is neural  foraminal narrowing on the left at the L2-L3 and L3-L4 level and neural  foraminal narrowing on the right at the L4-L5 and L5-S1 level. There is  moderate to severe central spinal canal stenosis most pronounced at the  L3-L4 level in which nerve root contact and compromise cannot be  excluded. Clinical correlation is needed.    D:  12/07/2020  E:  12/07/2020    This report was finalized on 12/7/2020 9:01 PM by Dr. Vale Hair MD.      Impression & Plan:       02/09/2024: Behzad Peoples is a 69 y.o. male with past medical history significant for GERD, HLD, HTN, L3, L4 laminectomy in 2021 with Dr. Quiros who presents to the pain clinic for evaluation and treatment of persistent low back pain with radiation to bilateral lower extremities.  I personally reviewed and interpreted his lumbar MRI demonstrating: L5/S1 DDD; L3/L4 grade 1 anterolisthesis; L4/L5 grade 1 anterolisthesis; left L3/L4 NFS; L3/4 and L4/5 facet spondylosis; multifidus atrophy.  Examination consistent with lumbar radiculopathy, postlaminectomy syndrome.  We discussed epidural steroid injection to improve pain.  If greater than 50% relief for at least 2-3 months can consider repeat as needed every 3 to 4 months.  I had a discussion with the patient regarding the risks of the procedure including bleeding, infection, damage to surrounding structures.  We discussed the potential adverse effects of corticosteroid injection including flushing of the face, lipodystrophy, skin discoloration, elevated blood glucose, increased blood pressure.  Risks of frequent steroid  administration include weight gain, hormonal changes, mood changes, osteoporosis.  Additionally discussed spinal cord stimulator trial.  Patient would like to pursue trial, and he first needs to find elder care for his father.    2024: Excellent relief from TFESI.  Can repeat if needed.  If minimal or transient benefit we will proceed with SCS trial  24: Excellent relief from TFESI.  Will plan for repeat.  Will plan for SCS trial if minimal or transient benefit, or whenever patient is in a place to undergo this procedure.  10/18/2024: Good relief from repeat TFESI.  Can consider repeat if needed.  Can consider SCS trial.    3/14/2025: Good relief from TFESI ongoing.  Can consider repeat if needed.  Would require blood thinner clearance.    1. Postlaminectomy syndrome    2. Lumbar radiculopathy                PLAN:  1. Medication Recommendations: Recommend Voltaren topical, NSAIDs, Tylenol.  Can trial turmeric 500 mg twice daily if NSAID contraindicated.  -Can consider gabapentin/pregabalin    2. Physical Therapy: Continue HEP    3. Psychological: Psychiatric clearance has already been obtained at last office visit; not interested in SCS trial    4. Complementary and alternative (CAM) Therapies:     5. Labs/Diagnostic studies: None indicated     6. Imagin. Interventions: Can consider repeat left L3/L4 and L4/L5 transforaminal epidural steroid injection (20020, 72702).    -Not currently interested in SCS trial  -Will delay any additional interventions until after he recovers from DVT and thrombectomy.  Additional TFESI's or SCS trial would require holding of Eliquis    8. Referrals:     9. Records:    10. Lifestyle goals:    Follow-up 6 months      [facility]  Nika Mancia PA-C

## 2025-03-26 ENCOUNTER — OFFICE VISIT (OUTPATIENT)
Dept: FAMILY MEDICINE CLINIC | Facility: CLINIC | Age: 70
End: 2025-03-26
Payer: MEDICARE

## 2025-03-26 VITALS
BODY MASS INDEX: 33.8 KG/M2 | HEIGHT: 73 IN | DIASTOLIC BLOOD PRESSURE: 82 MMHG | HEART RATE: 51 BPM | OXYGEN SATURATION: 98 % | SYSTOLIC BLOOD PRESSURE: 140 MMHG | WEIGHT: 255 LBS

## 2025-03-26 DIAGNOSIS — K21.9 GASTROESOPHAGEAL REFLUX DISEASE, UNSPECIFIED WHETHER ESOPHAGITIS PRESENT: ICD-10-CM

## 2025-03-26 DIAGNOSIS — E78.2 MIXED HYPERLIPIDEMIA: Primary | ICD-10-CM

## 2025-03-26 DIAGNOSIS — J30.9 ALLERGIC RHINITIS, UNSPECIFIED SEASONALITY, UNSPECIFIED TRIGGER: ICD-10-CM

## 2025-03-26 DIAGNOSIS — M51.362 DEGENERATION OF INTERVERTEBRAL DISC OF LUMBAR REGION WITH DISCOGENIC BACK PAIN AND LOWER EXTREMITY PAIN: ICD-10-CM

## 2025-03-26 DIAGNOSIS — J30.1 SEASONAL ALLERGIC RHINITIS DUE TO POLLEN: ICD-10-CM

## 2025-03-26 DIAGNOSIS — Z86.718 HISTORY OF DVT (DEEP VEIN THROMBOSIS): ICD-10-CM

## 2025-03-26 DIAGNOSIS — I10 BENIGN ESSENTIAL HTN: ICD-10-CM

## 2025-03-26 PROCEDURE — 3079F DIAST BP 80-89 MM HG: CPT | Performed by: FAMILY MEDICINE

## 2025-03-26 PROCEDURE — 1126F AMNT PAIN NOTED NONE PRSNT: CPT | Performed by: FAMILY MEDICINE

## 2025-03-26 PROCEDURE — 99214 OFFICE O/P EST MOD 30 MIN: CPT | Performed by: FAMILY MEDICINE

## 2025-03-26 PROCEDURE — 3077F SYST BP >= 140 MM HG: CPT | Performed by: FAMILY MEDICINE

## 2025-03-26 RX ORDER — EZETIMIBE 10 MG/1
10 TABLET ORAL DAILY
Qty: 90 TABLET | Refills: 1 | Status: SHIPPED | OUTPATIENT
Start: 2025-03-26

## 2025-03-26 RX ORDER — FAMOTIDINE 40 MG/1
40 TABLET, FILM COATED ORAL NIGHTLY
Qty: 90 TABLET | Refills: 3 | Status: SHIPPED | OUTPATIENT
Start: 2025-03-26

## 2025-03-26 RX ORDER — LISINOPRIL AND HYDROCHLOROTHIAZIDE 12.5; 2 MG/1; MG/1
1 TABLET ORAL DAILY
Qty: 90 TABLET | Refills: 3 | Status: SHIPPED | OUTPATIENT
Start: 2025-03-26

## 2025-03-26 RX ORDER — LEVOCETIRIZINE DIHYDROCHLORIDE 5 MG/1
5 TABLET, FILM COATED ORAL EVERY EVENING
COMMUNITY

## 2025-03-27 LAB
ALBUMIN SERPL-MCNC: 4.5 G/DL (ref 3.9–4.9)
ALP SERPL-CCNC: 56 IU/L (ref 44–121)
ALT SERPL-CCNC: 30 IU/L (ref 0–44)
AST SERPL-CCNC: 19 IU/L (ref 0–40)
BASOPHILS # BLD AUTO: 0.1 X10E3/UL (ref 0–0.2)
BASOPHILS NFR BLD AUTO: 1 %
BILIRUB SERPL-MCNC: 0.3 MG/DL (ref 0–1.2)
BUN SERPL-MCNC: 12 MG/DL (ref 8–27)
BUN/CREAT SERPL: 11 (ref 10–24)
CALCIUM SERPL-MCNC: 10.1 MG/DL (ref 8.6–10.2)
CHLORIDE SERPL-SCNC: 101 MMOL/L (ref 96–106)
CHOLEST SERPL-MCNC: 232 MG/DL (ref 100–199)
CO2 SERPL-SCNC: 25 MMOL/L (ref 20–29)
CREAT SERPL-MCNC: 1.05 MG/DL (ref 0.76–1.27)
EGFRCR SERPLBLD CKD-EPI 2021: 77 ML/MIN/1.73
EOSINOPHIL # BLD AUTO: 0.3 X10E3/UL (ref 0–0.4)
EOSINOPHIL NFR BLD AUTO: 4 %
ERYTHROCYTE [DISTWIDTH] IN BLOOD BY AUTOMATED COUNT: 13.1 % (ref 11.6–15.4)
GLOBULIN SER CALC-MCNC: 2.4 G/DL (ref 1.5–4.5)
GLUCOSE SERPL-MCNC: 100 MG/DL (ref 70–99)
HCT VFR BLD AUTO: 46 % (ref 37.5–51)
HDLC SERPL-MCNC: 39 MG/DL
HGB BLD-MCNC: 15.8 G/DL (ref 13–17.7)
IMM GRANULOCYTES # BLD AUTO: 0 X10E3/UL (ref 0–0.1)
IMM GRANULOCYTES NFR BLD AUTO: 0 %
LDLC SERPL CALC-MCNC: 155 MG/DL (ref 0–99)
LYMPHOCYTES # BLD AUTO: 1.7 X10E3/UL (ref 0.7–3.1)
LYMPHOCYTES NFR BLD AUTO: 25 %
MCH RBC QN AUTO: 31.5 PG (ref 26.6–33)
MCHC RBC AUTO-ENTMCNC: 34.3 G/DL (ref 31.5–35.7)
MCV RBC AUTO: 92 FL (ref 79–97)
MONOCYTES # BLD AUTO: 0.8 X10E3/UL (ref 0.1–0.9)
MONOCYTES NFR BLD AUTO: 12 %
NEUTROPHILS # BLD AUTO: 3.8 X10E3/UL (ref 1.4–7)
NEUTROPHILS NFR BLD AUTO: 58 %
PLATELET # BLD AUTO: 312 X10E3/UL (ref 150–450)
POTASSIUM SERPL-SCNC: 4.4 MMOL/L (ref 3.5–5.2)
PROT SERPL-MCNC: 6.9 G/DL (ref 6–8.5)
RBC # BLD AUTO: 5.01 X10E6/UL (ref 4.14–5.8)
SODIUM SERPL-SCNC: 140 MMOL/L (ref 134–144)
TRIGL SERPL-MCNC: 208 MG/DL (ref 0–149)
TSH SERPL DL<=0.005 MIU/L-ACNC: 1.55 UIU/ML (ref 0.45–4.5)
VLDLC SERPL CALC-MCNC: 38 MG/DL (ref 5–40)
WBC # BLD AUTO: 6.7 X10E3/UL (ref 3.4–10.8)

## 2025-03-27 NOTE — PROGRESS NOTES
Follow Up Office Visit      Date of Visit:  2025   Patient Name: Behzad Peoples  : 1955   MRN: [id number]     Chief Complaint:    Chief Complaint   Patient presents with   • Hyperlipidemia   • Allergic Rhinitis       History of Present Illness: Behzad Peoples is a 69 y.o. male who is here today for follow up.    History of Present Illness  The patient presents for a checkup.    He has been under the care of [facility], with a follow-up appointment scheduled in a year. His blood clots are chronic, and he is currently on a regimen of Eliquis twice daily and aspirin. He reports experiencing fatigue, which he suspects may be a side effect of Eliquis. He is also on Zetia for cholesterol management, having discontinued statins due to adverse effects. He is considering the use of red rice yeast but expresses concern about potential allergic reactions. He has previously received injections for environmental allergies and bee sting allergies but has since discontinued these treatments.    He is currently taking nabumetone twice daily but reports increased pain in his extremities and joints, particularly in his hands. He is considering increasing the dosage of nabumetone but is concerned about potential liver or kidney damage. He has tried Celebrex without significant relief and has previously taken a higher dose of nabumetone (750 mg twice daily).    He experiences foot pain, which he describes as feeling like blocks, and attributes this to his back pain. This foot pain occasionally leads to balance issues. He also reports stiffness in his feet, which he initially attributed to his blood clot, but notes that it affects both feet.    ALLERGIES  The patient is allergic to MOLD.    MEDICATIONS  Current: Eliquis, aspirin, Zetia, lisinopril, Pepcid, nabumetone  Discontinued: Celebrex      Subjective      Review of Systems:   Review of Systems    Past Medical History:   Past Medical History:  Patient moved to ED room 12 via self, patient assisted onto stretcher and changed into a gown. Patient placed on cardiac monitor, continuous pulse oximetry and automatic blood pressure cuff. Bed placed in low locked position, side rails up x 2, call light is within reach of patient or family, orientation to room and explanation of wait provided to family and patient, alarms set and turned on for monitor and pulse ox, awaiting MD evaluation and orders, will continue to monitor.     Diagnosis Date   • Allergic    • Arthritis    • Bronchitis    • Bulging of cervical intervertebral disc 06/21/2018   • DVT (deep venous thrombosis)    • GERD (gastroesophageal reflux disease)    • History of SCC (squamous cell carcinoma) of skin    • Hyperlipidemia    • Hypertension    • Joint pain 2006   • Low back pain 1988   • Melanoma    • Melanoma     scalp   • Osteoarthritis 2006   • Spinal stenosis 2006    Arthritis and stenosis throughout spine   • Wears glasses        Past Surgical History:   Past Surgical History:   Procedure Laterality Date   • BACK SURGERY  3/2021    Laminectomy x2   • COLONOSCOPY  2014   • HERNIA REPAIR  8/2021   • LAMINECTOMY  03/2021   • LUMBAR LAMINECTOMY DISCECTOMY DECOMPRESSION Bilateral 03/19/2021    Procedure: LUMBAR DECOMPRESSION L3, L4;  Surgeon: Saran Quiros MD;  Location: [facility];  Service: Neurosurgery;  Laterality: Bilateral;   • SKIN CANCER EXCISION     • SPINE SURGERY  3/2021    Laminectomy x 2   • THROMBECTOMY Left 10/15/2024    Left leg, [facility]       Family History:   Family History   Problem Relation Age of Onset   • Arthritis Father    • Diabetes Father    • Hearing loss Father    • Hyperlipidemia Father    • Thyroid disease Father    • Hypertension Father        Social History:   Social History     Socioeconomic History   • Marital status:    Tobacco Use   • Smoking status: Never   • Smokeless tobacco: Never   Vaping Use   • Vaping status: Never Used   Substance and Sexual Activity   • Alcohol use: Not Currently     Comment: 1 drink a week   • Drug use: No   • Sexual activity: Defer       Medications:     Current Outpatient Medications:   •  apixaban (ELIQUIS) 2.5 MG tablet tablet, Take 1 tablet by mouth 2 (Two) Times a Day., Disp: 180 tablet, Rfl: 1  •  ezetimibe (Zetia) 10 MG tablet, Take 1 tablet by mouth Daily., Disp: 90 tablet, Rfl: 1  •  famotidine (PEPCID) 40 MG tablet, Take 1 tablet by mouth Every Night., Disp: 90 tablet, Rfl: 3  •   "levocetirizine (XYZAL) 5 MG tablet, Take 1 tablet by mouth Every Evening., Disp: , Rfl:   •  lisinopril-hydrochlorothiazide (PRINZIDE,ZESTORETIC) 20-12.5 MG per tablet, Take 1 tablet by mouth Daily., Disp: 90 tablet, Rfl: 3  •  nabumetone (RELAFEN) 750 MG tablet, Take 1 tablet by mouth 2 (Two) Times a Day As Needed for Mild Pain., Disp: 180 tablet, Rfl: 1  •  multivitamin with minerals tablet tablet, Take 1 tablet by mouth Daily. (Patient not taking: Reported on 3/26/2025), Disp: , Rfl:     Allergies:   Allergies   Allergen Reactions   • Bee Venom Anaphylaxis     Takes shots to prevent   • Omeprazole Rash   • Protonix [Pantoprazole] Rash     Any of the omeprazole drugs   • Sulfa Antibiotics Rash     Pt can't remember reaction   • Suture Rash       Objective     Physical Exam:  Vital Signs:   Vitals:    03/26/25 1101   BP: 140/82   Pulse: 51   SpO2: 98%   Weight: 116 kg (255 lb)   Height: 185.4 cm (73\")     Body mass index is 33.64 kg/m².     Physical Exam  Vitals and nursing note reviewed.   Constitutional:       General: He is not in acute distress.     Appearance: Normal appearance. He is not ill-appearing.   HENT:      Head: Normocephalic and atraumatic.      Right Ear: Tympanic membrane and ear canal normal.      Left Ear: Tympanic membrane and ear canal normal.      Nose: Nose normal.   Cardiovascular:      Rate and Rhythm: Normal rate and regular rhythm.      Heart sounds: Normal heart sounds.   Pulmonary:      Effort: Pulmonary effort is normal.      Breath sounds: Normal breath sounds.   Neurological:      Mental Status: He is alert and oriented to person, place, and time. Mental status is at baseline.   Psychiatric:         Mood and Affect: Mood normal.       Physical Exam      Procedures    Results  Laboratory Studies  Kidney function, liver function, electrolytes all good. Cholesterol levels need improvement.  Assessment / Plan      Assessment/Plan:   Diagnoses and all orders for this visit:    1. Mixed " hyperlipidemia (Primary)  -     CBC & Differential  -     Comprehensive Metabolic Panel  -     Lipid Panel  -     TSH    2. Benign essential HTN  -     CBC & Differential  -     Comprehensive Metabolic Panel  -     Lipid Panel  -     TSH  -     lisinopril-hydrochlorothiazide (PRINZIDE,ZESTORETIC) 20-12.5 MG per tablet; Take 1 tablet by mouth Daily.  Dispense: 90 tablet; Refill: 3    3. Gastroesophageal reflux disease, unspecified whether esophagitis present  -     famotidine (PEPCID) 40 MG tablet; Take 1 tablet by mouth Every Night.  Dispense: 90 tablet; Refill: 3    4. Seasonal allergic rhinitis due to pollen  -     Ambulatory Referral to Allergy    5. History of DVT (deep vein thrombosis)    6. Degeneration of intervertebral disc of lumbar region with discogenic back pain and lower extremity pain    7. Allergic rhinitis, unspecified seasonality, unspecified trigger    Other orders  -     apixaban (ELIQUIS) 2.5 MG tablet tablet; Take 1 tablet by mouth 2 (Two) Times a Day.  Dispense: 180 tablet; Refill: 1  -     nabumetone (RELAFEN) 750 MG tablet; Take 1 tablet by mouth 2 (Two) Times a Day As Needed for Mild Pain.  Dispense: 180 tablet; Refill: 1  -     ezetimibe (Zetia) 10 MG tablet; Take 1 tablet by mouth Daily.  Dispense: 90 tablet; Refill: 1       Assessment & Plan  1. Deep Vein Thrombosis (DVT).  The DVT is unprovoked and requires lifelong anticoagulation therapy. He is currently on low-dose Eliquis (2.5 mg twice a day) and aspirin daily. If symptoms worsen or new symptoms develop, the dosage will be increased to the full dose of Eliquis (5 mg twice a day). The potential risks and benefits of lifelong anticoagulation were discussed.    2. Hyperlipidemia.  Cholesterol levels need improvement. He has been on Zetia since September 2024. A lipid panel will be ordered today to assess the effectiveness of Zetia. If cholesterol levels are not adequately controlled, alternative treatments such as red yeast rice or  injectable medications like Repatha or Praluent may be considered.    3. Arthralgia.  He reports increased pain in his extremities and joints, which may be related to his current medication regimen. The dosage of nabumetone will be increased to 750 mg twice a day. Kidney and liver function will be monitored through blood work to ensure the medication is not causing harm.    4. Foot Pain.  The foot pain is likely related to his back condition rather than peripheral neuropathy or circulatory issues. The possibility of nerve-related issues was discussed. No immediate changes to the current treatment plan were made.    5. Medication Management.  A 90-day supply of his current medications, including lisinopril and Pepcid, will be provided. Lisinopril will be re-prescribed as it runs out in about a month and a half.    6. Allergies.  A referral to [facility] will be made for further evaluation and management of his allergies. He has a history of environmental and bee sting allergies and has previously received allergy shots.    Follow-up  The patient will follow up in 3 months.        Follow Up:   No follow-ups on file.    Rigo Mustafa  [facility] [place]     Patient or patient representative verbalized consent for the use of Ambient Listening during the visit with  Rigo Mustafa MD for chart documentation. 3/26/2025  20:53 EDT

## 2025-03-30 ENCOUNTER — PATIENT MESSAGE (OUTPATIENT)
Dept: FAMILY MEDICINE CLINIC | Facility: CLINIC | Age: 70
End: 2025-03-30
Payer: MEDICARE

## 2025-03-30 ENCOUNTER — RESULTS FOLLOW-UP (OUTPATIENT)
Dept: FAMILY MEDICINE CLINIC | Facility: CLINIC | Age: 70
End: 2025-03-30
Payer: MEDICARE

## 2025-04-01 ENCOUNTER — OFFICE VISIT (OUTPATIENT)
Dept: FAMILY MEDICINE CLINIC | Facility: CLINIC | Age: 70
End: 2025-04-01
Payer: MEDICARE

## 2025-04-01 VITALS
SYSTOLIC BLOOD PRESSURE: 124 MMHG | HEIGHT: 73 IN | WEIGHT: 256 LBS | OXYGEN SATURATION: 95 % | BODY MASS INDEX: 33.93 KG/M2 | HEART RATE: 65 BPM | DIASTOLIC BLOOD PRESSURE: 70 MMHG

## 2025-04-01 DIAGNOSIS — L24.9 IRRITANT CONTACT DERMATITIS, UNSPECIFIED TRIGGER: Primary | ICD-10-CM

## 2025-04-01 PROCEDURE — 3078F DIAST BP <80 MM HG: CPT | Performed by: PHYSICIAN ASSISTANT

## 2025-04-01 PROCEDURE — 3074F SYST BP LT 130 MM HG: CPT | Performed by: PHYSICIAN ASSISTANT

## 2025-04-01 PROCEDURE — 99213 OFFICE O/P EST LOW 20 MIN: CPT | Performed by: PHYSICIAN ASSISTANT

## 2025-04-01 PROCEDURE — 1126F AMNT PAIN NOTED NONE PRSNT: CPT | Performed by: PHYSICIAN ASSISTANT

## 2025-04-01 RX ORDER — MOMETASONE FUROATE 1 MG/G
1 OINTMENT TOPICAL DAILY
Qty: 45 G | Refills: 0 | Status: SHIPPED | OUTPATIENT
Start: 2025-04-01

## 2025-04-01 RX ORDER — UBIDECARENONE 50 MG
1200 CAPSULE ORAL 2 TIMES DAILY
COMMUNITY

## 2025-04-01 NOTE — PROGRESS NOTES
".Chief Complaint  Skin Lesion (Area on lt inner calf )    Subjective          History of Present Illness  Behzad Peoples is here today with his  History of Present Illness  The patient presents for evaluation of a lesion on his inner calf.    He reports a persistent lesion on his inner calf, which initially presented as a minor irritation but has since enlarged. The lesion has been present for over a week. He has been intermittently applying a Band-Aid to the area and has attempted treatment with CeraVe lotion and Ultimate Skin Repair. He has not used any steroid-based treatments. He does not believe the lesion is indicative of shingles due to the absence of itching or pain. He has been utilizing a compression sock on the affected leg, which he acknowledges may contribute to heat retention and perspiration. He has a history of shingles, which previously manifested between his eyes and extended into his scalp.      Supplemental Information  He had a DVT in the same leg 6 months ago, which was treated with surgery.    FAMILY HISTORY  His mother has high cholesterol.    MEDICATIONS  Current: Zetia, red yeast rice    Objective   Vital Signs:   /70   Pulse 65   Ht 185.4 cm (73\")   Wt 116 kg (256 lb)   SpO2 95%   BMI 33.78 kg/m²     Body mass index is 33.78 kg/m².         Review of Systems      Current Outpatient Medications:   •  apixaban (ELIQUIS) 2.5 MG tablet tablet, Take 1 tablet by mouth 2 (Two) Times a Day., Disp: 180 tablet, Rfl: 1  •  ezetimibe (Zetia) 10 MG tablet, Take 1 tablet by mouth Daily., Disp: 90 tablet, Rfl: 1  •  famotidine (PEPCID) 40 MG tablet, Take 1 tablet by mouth Every Night., Disp: 90 tablet, Rfl: 3  •  levocetirizine (XYZAL) 5 MG tablet, Take 1 tablet by mouth Every Evening., Disp: , Rfl:   •  lisinopril-hydrochlorothiazide (PRINZIDE,ZESTORETIC) 20-12.5 MG per tablet, Take 1 tablet by mouth Daily., Disp: 90 tablet, Rfl: 3  •  multivitamin with minerals tablet tablet, Take 1 " tablet by mouth Daily., Disp: , Rfl:   •  nabumetone (RELAFEN) 750 MG tablet, Take 1 tablet by mouth 2 (Two) Times a Day As Needed for Mild Pain., Disp: 180 tablet, Rfl: 1  •  Red Yeast Rice 600 MG tablet, Take 1,200 mg by mouth 2 (Two) Times a Day., Disp: , Rfl:   •  mometasone (ELOCON) 0.1 % ointment, Apply 1 Application topically to the appropriate area as directed Daily., Disp: 45 g, Rfl: 0    Allergies: Bee venom, Omeprazole, Protonix [pantoprazole], Sulfa antibiotics, and Suture    Physical Exam  Vitals and nursing note reviewed.   Constitutional:       General: He is not in acute distress.     Appearance: Normal appearance. He is not ill-appearing, toxic-appearing or diaphoretic.   HENT:      Head: Normocephalic and atraumatic.   Pulmonary:      Effort: Pulmonary effort is normal.   Musculoskeletal:        Legs:    Skin:     General: Skin is warm.   Neurological:      General: No focal deficit present.      Mental Status: He is alert.   Psychiatric:         Mood and Affect: Mood normal.         Behavior: Behavior normal.      Physical Exam      Result Review :          Results               Assessment and Plan    Diagnoses and all orders for this visit:    1. Irritant contact dermatitis, unspecified trigger (Primary)    -     mometasone (ELOCON) 0.1 % ointment; Apply 1 Application topically to the appropriate area as directed Daily.  Dispense: 45 g; Refill: 0      Assessment & Plan    The lesion appears irritated but does not exhibit signs of infection. It is not consistent with shingles as there is no itching or pain. The use of a compression sock may have contributed to the irritation. A prescription for Elocon 1% cream has been issued, with instructions to apply it once or twice daily for a few days. He is advised to monitor the lesion for any changes or signs of infection.          Follow Up   No follow-ups on file.  Patient was given instructions and counseling regarding his condition or for health  maintenance advice. Please see specific information pulled into the AVS if appropriate.     Patient or patient representative verbalized consent for the use of Ambient Listening during the visit with  GABRIELLE Luna for chart documentation. 4/1/2025  17:29 EDT    GABRIELLE Luna  04/01/2025

## 2025-06-23 ENCOUNTER — OFFICE VISIT (OUTPATIENT)
Dept: FAMILY MEDICINE CLINIC | Facility: CLINIC | Age: 70
End: 2025-06-23
Payer: MEDICARE

## 2025-06-23 VITALS
SYSTOLIC BLOOD PRESSURE: 110 MMHG | DIASTOLIC BLOOD PRESSURE: 64 MMHG | WEIGHT: 245 LBS | HEIGHT: 73 IN | HEART RATE: 58 BPM | OXYGEN SATURATION: 98 % | BODY MASS INDEX: 32.47 KG/M2

## 2025-06-23 DIAGNOSIS — E78.2 MIXED HYPERLIPIDEMIA: ICD-10-CM

## 2025-06-23 DIAGNOSIS — R30.0 DYSURIA: ICD-10-CM

## 2025-06-23 DIAGNOSIS — Z12.5 PROSTATE CANCER SCREENING: ICD-10-CM

## 2025-06-23 DIAGNOSIS — K21.9 GASTROESOPHAGEAL REFLUX DISEASE, UNSPECIFIED WHETHER ESOPHAGITIS PRESENT: ICD-10-CM

## 2025-06-23 DIAGNOSIS — I10 BENIGN ESSENTIAL HTN: ICD-10-CM

## 2025-06-23 DIAGNOSIS — M51.362 DEGENERATION OF INTERVERTEBRAL DISC OF LUMBAR REGION WITH DISCOGENIC BACK PAIN AND LOWER EXTREMITY PAIN: ICD-10-CM

## 2025-06-23 DIAGNOSIS — Z00.00 MEDICARE ANNUAL WELLNESS VISIT, SUBSEQUENT: Primary | ICD-10-CM

## 2025-06-23 DIAGNOSIS — Z86.718 HISTORY OF DVT (DEEP VEIN THROMBOSIS): ICD-10-CM

## 2025-06-23 DIAGNOSIS — J30.1 SEASONAL ALLERGIC RHINITIS DUE TO POLLEN: ICD-10-CM

## 2025-06-23 LAB
BILIRUB BLD-MCNC: NEGATIVE MG/DL
CLARITY, POC: CLEAR
COLOR UR: YELLOW
EXPIRATION DATE: ABNORMAL
GLUCOSE UR STRIP-MCNC: NEGATIVE MG/DL
KETONES UR QL: NEGATIVE
LEUKOCYTE EST, POC: ABNORMAL
Lab: ABNORMAL
NITRITE UR-MCNC: NEGATIVE MG/ML
PH UR: 7 [PH] (ref 5–8)
PROT UR STRIP-MCNC: ABNORMAL MG/DL
RBC # UR STRIP: ABNORMAL /UL
SP GR UR: 1.01 (ref 1–1.03)
UROBILINOGEN UR QL: ABNORMAL

## 2025-06-23 PROCEDURE — G0439 PPPS, SUBSEQ VISIT: HCPCS | Performed by: FAMILY MEDICINE

## 2025-06-23 PROCEDURE — 99214 OFFICE O/P EST MOD 30 MIN: CPT | Performed by: FAMILY MEDICINE

## 2025-06-23 PROCEDURE — 1170F FXNL STATUS ASSESSED: CPT | Performed by: FAMILY MEDICINE

## 2025-06-23 PROCEDURE — 3078F DIAST BP <80 MM HG: CPT | Performed by: FAMILY MEDICINE

## 2025-06-23 PROCEDURE — 1126F AMNT PAIN NOTED NONE PRSNT: CPT | Performed by: FAMILY MEDICINE

## 2025-06-23 PROCEDURE — 81003 URINALYSIS AUTO W/O SCOPE: CPT | Performed by: FAMILY MEDICINE

## 2025-06-23 PROCEDURE — 3074F SYST BP LT 130 MM HG: CPT | Performed by: FAMILY MEDICINE

## 2025-06-23 RX ORDER — CIPROFLOXACIN 500 MG/1
500 TABLET, FILM COATED ORAL 2 TIMES DAILY
Qty: 14 TABLET | Refills: 0 | Status: SHIPPED | OUTPATIENT
Start: 2025-06-23

## 2025-06-24 ENCOUNTER — RESULTS FOLLOW-UP (OUTPATIENT)
Dept: FAMILY MEDICINE CLINIC | Facility: CLINIC | Age: 70
End: 2025-06-24
Payer: MEDICARE

## 2025-06-24 LAB
ALBUMIN SERPL-MCNC: 4.1 G/DL (ref 3.9–4.9)
ALP SERPL-CCNC: 64 IU/L (ref 44–121)
ALT SERPL-CCNC: 27 IU/L (ref 0–44)
AST SERPL-CCNC: 18 IU/L (ref 0–40)
BASOPHILS # BLD AUTO: 0.1 X10E3/UL (ref 0–0.2)
BASOPHILS NFR BLD AUTO: 0 %
BILIRUB SERPL-MCNC: 0.8 MG/DL (ref 0–1.2)
BUN SERPL-MCNC: 9 MG/DL (ref 8–27)
BUN/CREAT SERPL: 8 (ref 10–24)
CALCIUM SERPL-MCNC: 9 MG/DL (ref 8.6–10.2)
CHLORIDE SERPL-SCNC: 100 MMOL/L (ref 96–106)
CHOLEST SERPL-MCNC: 210 MG/DL (ref 100–199)
CO2 SERPL-SCNC: 21 MMOL/L (ref 20–29)
CREAT SERPL-MCNC: 1.07 MG/DL (ref 0.76–1.27)
EGFRCR SERPLBLD CKD-EPI 2021: 76 ML/MIN/1.73
EOSINOPHIL # BLD AUTO: 0.2 X10E3/UL (ref 0–0.4)
EOSINOPHIL NFR BLD AUTO: 1 %
ERYTHROCYTE [DISTWIDTH] IN BLOOD BY AUTOMATED COUNT: 12.9 % (ref 11.6–15.4)
GLOBULIN SER CALC-MCNC: 2.6 G/DL (ref 1.5–4.5)
GLUCOSE SERPL-MCNC: 101 MG/DL (ref 70–99)
HCT VFR BLD AUTO: 47 % (ref 37.5–51)
HDLC SERPL-MCNC: 41 MG/DL
HGB BLD-MCNC: 15.4 G/DL (ref 13–17.7)
IMM GRANULOCYTES # BLD AUTO: 0 X10E3/UL (ref 0–0.1)
IMM GRANULOCYTES NFR BLD AUTO: 0 %
LDLC SERPL CALC-MCNC: 140 MG/DL (ref 0–99)
LYMPHOCYTES # BLD AUTO: 1.7 X10E3/UL (ref 0.7–3.1)
LYMPHOCYTES NFR BLD AUTO: 14 %
MCH RBC QN AUTO: 32.2 PG (ref 26.6–33)
MCHC RBC AUTO-ENTMCNC: 32.8 G/DL (ref 31.5–35.7)
MCV RBC AUTO: 98 FL (ref 79–97)
MONOCYTES # BLD AUTO: 1.4 X10E3/UL (ref 0.1–0.9)
MONOCYTES NFR BLD AUTO: 11 %
NEUTROPHILS # BLD AUTO: 9.2 X10E3/UL (ref 1.4–7)
NEUTROPHILS NFR BLD AUTO: 74 %
PLATELET # BLD AUTO: 275 X10E3/UL (ref 150–450)
POTASSIUM SERPL-SCNC: 4.2 MMOL/L (ref 3.5–5.2)
PROT SERPL-MCNC: 6.7 G/DL (ref 6–8.5)
PSA SERPL-MCNC: 0.4 NG/ML (ref 0–4)
RBC # BLD AUTO: 4.79 X10E6/UL (ref 4.14–5.8)
SODIUM SERPL-SCNC: 137 MMOL/L (ref 134–144)
TRIGL SERPL-MCNC: 163 MG/DL (ref 0–149)
TSH SERPL DL<=0.005 MIU/L-ACNC: 1.59 UIU/ML (ref 0.45–4.5)
VLDLC SERPL CALC-MCNC: 29 MG/DL (ref 5–40)
WBC # BLD AUTO: 12.6 X10E3/UL (ref 3.4–10.8)

## 2025-06-24 NOTE — PROGRESS NOTES
Subjective   The ABCs of the Annual Wellness Visit  Medicare Wellness Visit      Behzad Peoples is a 69 y.o. patient who presents for a Medicare Wellness Visit.    The following portions of the patient's history were reviewed and   updated as appropriate: allergies, current medications, past family history, past medical history, past social history, past surgical history, and problem list.    Compared to one year ago, the patient's physical   health is the same.  Compared to one year ago, the patient's mental   health is the same.    Recent Hospitalizations:  He was not admitted to the hospital during the last year.     Current Medical Providers:  Patient Care Team:  Rigo Mustafa MD as PCP - General (Family Medicine)  Alvin Terry MD as Consulting Physician (Anesthesiology)  Derek Piña APRN as Referring Physician (Nurse Practitioner)  Junior Guerra MD as Consulting Physician (Vascular Surgery)    Outpatient Medications Prior to Visit   Medication Sig Dispense Refill    apixaban (ELIQUIS) 2.5 MG tablet tablet Take 1 tablet by mouth 2 (Two) Times a Day. 180 tablet 1    ezetimibe (Zetia) 10 MG tablet Take 1 tablet by mouth Daily. 90 tablet 1    famotidine (PEPCID) 40 MG tablet Take 1 tablet by mouth Every Night. 90 tablet 3    levocetirizine (XYZAL) 5 MG tablet Take 1 tablet by mouth Every Evening.      lisinopril-hydrochlorothiazide (PRINZIDE,ZESTORETIC) 20-12.5 MG per tablet Take 1 tablet by mouth Daily. 90 tablet 3    mometasone (ELOCON) 0.1 % ointment Apply 1 Application topically to the appropriate area as directed Daily. 45 g 0    multivitamin with minerals tablet tablet Take 1 tablet by mouth Daily.      nabumetone (RELAFEN) 750 MG tablet Take 1 tablet by mouth 2 (Two) Times a Day As Needed for Mild Pain. 180 tablet 1    Red Yeast Rice 600 MG tablet Take 1,200 mg by mouth 2 (Two) Times a Day.       No facility-administered medications prior to visit.     No opioid medication  "identified on active medication list. I have reviewed chart for other potential  high risk medication/s and harmful drug interactions in the elderly.      Aspirin is not on active medication list.  Aspirin use is not indicated based on review of current medical condition/s. Risk of harm outweighs potential benefits.  .    Patient Active Problem List   Diagnosis    Degenerative disc disease, lumbar    Herniated lumbar intervertebral disc    Numbness and tingling    Degenerative disc disease, cervical    Bilateral carpal tunnel syndrome    Bulging of cervical intervertebral disc    GERD (gastroesophageal reflux disease)    Advanced directives, counseling/discussion    Screening for prostate cancer    Screening for colon cancer    Need for hepatitis C screening test    Mixed hyperlipidemia    Allergic rhinitis    Initial Medicare annual wellness visit    Benign essential HTN    Needs flu shot    Acute left-sided low back pain without sciatica    Acute deep vein thrombosis (DVT) of femoral vein of left lower extremity     Advance Care Planning Advance Directive is on file.  ACP discussion was held with the patient during this visit. Patient has an advance directive in EMR which is still valid.             Objective   Vitals:    06/23/25 1120   BP: 110/64   Pulse: 58   SpO2: 98%   Weight: 111 kg (245 lb)   Height: 185.4 cm (73\")   PainSc: 0-No pain       Estimated body mass index is 32.32 kg/m² as calculated from the following:    Height as of this encounter: 185.4 cm (73\").    Weight as of this encounter: 111 kg (245 lb).                Does the patient have evidence of cognitive impairment? No  Lab Results   Component Value Date    CHLPL 232 (H) 03/26/2025    TRIG 208 (H) 03/26/2025    HDL 39 (L) 03/26/2025     (H) 03/26/2025    VLDL 38 03/26/2025                                                                                                Health  Risk Assessment    Smoking Status:  Social History     Tobacco " Use   Smoking Status Never   Smokeless Tobacco Never     Alcohol Consumption:  Social History     Substance and Sexual Activity   Alcohol Use Not Currently    Comment: 1 drink a week       Fall Risk Screen  ZUNILDAADI Fall Risk Assessment was completed, and patient is at LOW risk for falls.Assessment completed on:2025    Depression Screening   Little interest or pleasure in doing things? Not at all   Feeling down, depressed, or hopeless? Not at all   PHQ-2 Total Score 0      Health Habits and Functional and Cognitive Screenin/23/2025    11:00 AM   Functional & Cognitive Status   Do you have difficulty preparing food and eating? No   Do you have difficulty bathing yourself, getting dressed or grooming yourself? No   Do you have difficulty using the toilet? No   Do you have difficulty moving around from place to place? No   Do you have trouble with steps or getting out of a bed or a chair? No   Current Diet Well Balanced Diet   Dental Exam Up to date   Eye Exam Up to date   Exercise (times per week) 7 times per week   Current Exercises Include Walking   Do you need help using the phone?  No   Are you deaf or do you have serious difficulty hearing?  No   Do you need help to go to places out of walking distance? No   Do you need help shopping? No   Do you need help preparing meals?  No   Do you need help with housework?  No   Do you need help with laundry? No   Do you need help taking your medications? No   Do you need help managing money? No   Do you ever drive or ride in a car without wearing a seat belt? No   Have you felt unusual fatigue (could be tiredness), stress, anger or loneliness in the last month? No    Who do you live with? Spouse   If you need help, do you have trouble finding someone available to you? No   Have you been bothered in the last four weeks by sexual problems? No   Do you have difficulty concentrating, remembering or making decisions? No       Data saved with a previous flowsheet row  definition           Age-appropriate Screening Schedule:  Refer to the list below for future screening recommendations based on patient's age, sex and/or medical conditions. Orders for these recommended tests are listed in the plan section. The patient has been provided with a written plan.    Health Maintenance List  Health Maintenance   Topic Date Due    TDAP/TD VACCINES (1 - Tdap) Never done    Pneumococcal Vaccine 50+ (1 of 1 - PCV) Never done    ZOSTER VACCINE (1 of 2) Never done    HEPATITIS C SCREENING  Never done    COVID-19 Vaccine (1 - 2024-25 season) Never done    ANNUAL WELLNESS VISIT  05/16/2025    INFLUENZA VACCINE  07/01/2025    LIPID PANEL  03/26/2026    COLORECTAL CANCER SCREENING  02/23/2033                                                                                                                                                CMS Preventative Services Quick Reference  Risk Factors Identified During Encounter  None Identified    The above risks/problems have been discussed with the patient.  Pertinent information has been shared with the patient in the After Visit Summary.  An After Visit Summary and PPPS were made available to the patient.    Follow Up:   Next Medicare Wellness visit to be scheduled in 1 year.         Additional E&M Note during same encounter follows:  Patient has additional, significant, and separately identifiable condition(s)/problem(s) that require work above and beyond the Medicare Wellness Visit     Chief Complaint  Annual Exam    Subjective    HPI  Behzad is also being seen today for additional medical problem/s.       The patient presents for a wellness visit.    He has been experiencing dysuria, characterized by a burning sensation during urination. Additionally, he reports pain in the lower abdominal region, similar to the discomfort associated with constipation, although he does not have constipation. This pain intensifies upon rising from bed and has been  persistent for several days. He is allergic to Bactrim and sulfa drugs.    Efforts to lose weight have resulted in a loss of 11 pounds over the past few months. Dietary changes include the elimination of bread, pasta, and potatoes. He consumes fish 3 to 4 times a week and alternates with burgers, pork chops, and chicken. He has expressed interest in trying Ozempic for weight management.    Significant hand pain, which he attributes to arthritis, has been troubling him. He has researched Zetia and its potential to cause arthralgia. Swelling in his hands is described as feeling like he has recently punched someone, compromising his dexterity. A rheumatology consultation yielded negative results. He is currently taking nabumetone, which provides some relief, but he is unable to take diclofenac due to adverse reactions. He does not use Tylenol Arthritis but is considering starting it to manage inflammation and pain. He has previously tried Celebrex twice daily without success and has since returned to nabumetone, which has not significantly improved his symptoms.    Regular physical activity includes walking at least 3 miles every other night, and sometimes nightly. He is currently taking ezetimibe in conjunction with red yeast rice and is eager to see the results of his upcoming blood test.    He underwent allergy testing last Wednesday, which he had not done in several years due to the M[facility] of his previous allergist. He is now under the care of Dr. Rudy Will. 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 He will need to receive injections for these allergies for 7 consecutive weeks at the office in [place].    He is allergic to omeprazole and Nexium, which caused rib spots under each breast and one on his tailbone. He is currently on famotidine as an alternative.    He has a history of back surgery and is under the care of a neurologist at [facility].    SOCIAL HISTORY  He does  "not smoke.          Objective   Vital Signs:  /64   Pulse 58   Ht 185.4 cm (73\")   Wt 111 kg (245 lb)   SpO2 98%   BMI 32.32 kg/m²   Physical Exam  Vitals and nursing note reviewed.   Constitutional:       General: He is not in acute distress.     Appearance: Normal appearance. He is not ill-appearing.   HENT:      Head: Normocephalic and atraumatic.      Right Ear: Tympanic membrane and ear canal normal.      Left Ear: Tympanic membrane and ear canal normal.      Nose: Nose normal.   Cardiovascular:      Rate and Rhythm: Normal rate and regular rhythm.      Heart sounds: Normal heart sounds.   Pulmonary:      Effort: Pulmonary effort is normal.      Breath sounds: Normal breath sounds.   Neurological:      Mental Status: He is alert and oriented to person, place, and time. Mental status is at baseline.   Psychiatric:         Mood and Affect: Mood normal.           Genitourinary: Urinalysis shows presence of blood and white blood cells, indicating a urinary tract infection.            Results  Labs   - Urinalysis: Presence of blood and white blood cells           Assessment and Plan Additional age appropriate preventative wellness advice topics were discussed during today's preventative wellness exam(some topics already addressed during AWV portion of the note above):    Physical Activity: Advised cardiovascular activity 150 minutes per week as tolerated. (example brisk walk for 30 minutes, 5 days a week).     Nutrition: Discussed nutrition plan with patient. Information shared in after visit summary. Goal is for a well balanced diet to enhance overall health.     Healthy Weight: Discussed current and goal BMI with patient. Steps to attain this goal discussed. Information shared in after visit summary.       1. Urinary Tract Infection.  - Symptoms include a burning sensation during urination and pain in the lower abdominal area.  - Urinalysis indicates the presence of blood and white blood cells, " suggesting an infection.  - Ciprofloxacin 500 mg twice daily for 7 days (14 pills) will be prescribed.  - A urine culture will be conducted to ensure the appropriateness of the antibiotic treatment.    2. Weight Management.  - The patient has lost 11 pounds in the last couple of months by avoiding bread, pasta, and potatoes.  - Blood pressure is well-controlled at 110/64. Weight has decreased from 256 to 245 pounds in less than 2 months.  - Advised to aim for a weight loss of 1 to 2 pounds per week, equivalent to 4 to 8 pounds per month.  - Emphasized the importance of maintaining protein intake and incorporating exercise into his routine to promote muscle growth.    3. Hand Pain.  - Experiencing pain and swelling in the hands, which may be related to arthralgia or arthritis.  - Nabumetone has been partially effective but not sufficient.  - Rheumatology tests were negative.  - Advised to start Tylenol Arthritis twice daily to help manage the pain and inflammation.    4. Cholesterol Management.  - Currently taking ezetimibe in conjunction with red yeast rice.  - Blood work will be ordered to monitor cholesterol levels, including LDL and triglycerides.  - Previous labs in March showed kidneys, liver, and blood counts were within normal limits.  - PSA levels will be checked today as it has been over a year since the last test.    5. Allergy Management.  - Recently underwent allergy testing and will need to continue with injections for Vespids and honeybees.  - Will follow up with the allergist for further management.  - Allergy testing showed pronounced reactions to Vespids and honeybees in the final round.    6. Medication Management.  - Lisinopril and famotidine prescriptions were filled in March for a year.  - Nabumetone was filled for 6 months and will be refilled at the end of September after checking kidney function.  - Zetia (ezetimibe) prescription was filled and will be monitored with upcoming blood  tests.    7. Health Maintenance.  - Blood pressure is well-controlled at 110/64. Weight has decreased from 256 to 245 pounds in less than 2 months.  - Blood counts are within normal limits.  - Colon cancer screening is up-to-date as of 2023.  - No lung cancer screening is necessary due to non-smoking status.  - PSA levels will be checked today as it has been over a year since the last test.         Follow Up   No follow-ups on file.  Patient was given instructions and counseling regarding his condition or for health maintenance advice. Please see specific information pulled into the AVS if appropriate.  Patient or patient representative verbalized consent for the use of Ambient Listening during the visit with  Rigo Mustafa MD for chart documentation. 6/23/2025  20:07 EDT

## 2025-06-25 LAB
BACTERIA UR CULT: NO GROWTH
BACTERIA UR CULT: NORMAL

## 2025-07-14 ENCOUNTER — OFFICE VISIT (OUTPATIENT)
Dept: FAMILY MEDICINE CLINIC | Facility: CLINIC | Age: 70
End: 2025-07-14
Payer: MEDICARE

## 2025-07-14 VITALS
OXYGEN SATURATION: 98 % | DIASTOLIC BLOOD PRESSURE: 72 MMHG | SYSTOLIC BLOOD PRESSURE: 130 MMHG | HEART RATE: 53 BPM | WEIGHT: 250 LBS | BODY MASS INDEX: 33.13 KG/M2 | HEIGHT: 73 IN

## 2025-07-14 DIAGNOSIS — H61.23 BILATERAL IMPACTED CERUMEN: ICD-10-CM

## 2025-07-14 DIAGNOSIS — S81.811D: ICD-10-CM

## 2025-07-14 DIAGNOSIS — M25.561 ACUTE PAIN OF RIGHT KNEE: Primary | ICD-10-CM

## 2025-07-14 PROCEDURE — 3075F SYST BP GE 130 - 139MM HG: CPT | Performed by: PHYSICIAN ASSISTANT

## 2025-07-14 PROCEDURE — 3078F DIAST BP <80 MM HG: CPT | Performed by: PHYSICIAN ASSISTANT

## 2025-07-14 PROCEDURE — 99214 OFFICE O/P EST MOD 30 MIN: CPT | Performed by: PHYSICIAN ASSISTANT

## 2025-07-14 PROCEDURE — 1126F AMNT PAIN NOTED NONE PRSNT: CPT | Performed by: PHYSICIAN ASSISTANT

## 2025-07-14 PROCEDURE — 69209 REMOVE IMPACTED EAR WAX UNI: CPT | Performed by: PHYSICIAN ASSISTANT

## 2025-07-14 NOTE — PROGRESS NOTES
.Chief Complaint  Knee Pain (Rt knee pain and weakness ), Ear Fullness (Would like to have ears flushed ), and Follow-up (Follow up on skin laceration on lt shin)    Subjective          History of Present Illness  Behzad Peoples is here today with his  History of Present Illness  The patient presents for evaluation of right knee pain, skin laceration on shin, and ear wax buildup.    He has been experiencing discomfort in his right knee for the past few weeks, which he attributes to his nightly 3-mile walks on a gravel road. He suspects he may have strained it during one of these walks. The pain was severe enough to disrupt his sleep for a couple of nights, accompanied by mild swelling on the side of the knee. The swelling has since subsided. He reports no numbness or tingling but does experience cramping behind the knee when standing. He has an upcoming appointment with his vascular surgeon on 07/21/2025. He also mentions that his arthritis, which was previously moderate, has become severe recently. He has not tried using an Ace bandage or brace. He has not had any hip or knee surgery but did undergo spinal surgery and a thrombectomy. Despite taking 4 Tylenol Arthritis Strength tablets daily along with nabumetone, the pain persists.    He has a skin laceration on his shin, which he believes is exacerbated by the compression sock he wears on that leg. He has been applying a prescribed ointment, but not consistently. The laceration appears red after showering and standing, but is not open.    He occasionally experiences a sensation of fullness in his ears due to a long history of wax buildup. He reports no hearing loss.    He started taking Zetia and red yeast rice about 6 months ago because he could not take statins due to severe cramps. His LDL and overall cholesterol levels have decreased since starting this regimen. However, he believes the Zetia is causing him significant pain, so he has decided to  "discontinue it and continue with just the red yeast rice. He cannot take omeprazole and Nexium family of drugs as he gets a breakout.    PAST SURGICAL HISTORY:  Spinal surgery  Thrombectomy    Objective   Vital Signs:   /72   Pulse 53   Ht 185.4 cm (73\")   Wt 113 kg (250 lb)   SpO2 98%   BMI 32.98 kg/m²     Body mass index is 32.98 kg/m².         Review of Systems      Current Outpatient Medications:     apixaban (ELIQUIS) 2.5 MG tablet tablet, Take 1 tablet by mouth 2 (Two) Times a Day., Disp: 180 tablet, Rfl: 1    famotidine (PEPCID) 40 MG tablet, Take 1 tablet by mouth Every Night., Disp: 90 tablet, Rfl: 3    levocetirizine (XYZAL) 5 MG tablet, Take 1 tablet by mouth Every Evening., Disp: , Rfl:     lisinopril-hydrochlorothiazide (PRINZIDE,ZESTORETIC) 20-12.5 MG per tablet, Take 1 tablet by mouth Daily., Disp: 90 tablet, Rfl: 3    mometasone (ELOCON) 0.1 % ointment, Apply 1 Application topically to the appropriate area as directed Daily., Disp: 45 g, Rfl: 0    multivitamin with minerals tablet tablet, Take 1 tablet by mouth Daily., Disp: , Rfl:     nabumetone (RELAFEN) 750 MG tablet, Take 1 tablet by mouth 2 (Two) Times a Day As Needed for Mild Pain., Disp: 180 tablet, Rfl: 1    Red Yeast Rice 600 MG tablet, Take 1,200 mg by mouth 2 (Two) Times a Day., Disp: , Rfl:     ciprofloxacin (Cipro) 500 MG tablet, Take 1 tablet by mouth 2 (Two) Times a Day., Disp: 14 tablet, Rfl: 0    ezetimibe (Zetia) 10 MG tablet, Take 1 tablet by mouth Daily. (Patient not taking: Reported on 7/14/2025), Disp: 90 tablet, Rfl: 1    Allergies: Bee venom, Omeprazole, Protonix [pantoprazole], Sulfa antibiotics, and Suture    Physical Exam  Vitals and nursing note reviewed.   Constitutional:       General: He is not in acute distress.     Appearance: Normal appearance. He is not ill-appearing, toxic-appearing or diaphoretic.   HENT:      Head: Normocephalic and atraumatic.      Right Ear: There is impacted cerumen.      Left Ear: " There is impacted cerumen.      Nose: Nose normal.      Mouth/Throat:      Mouth: Mucous membranes are moist.   Pulmonary:      Effort: Pulmonary effort is normal.   Musculoskeletal:        Legs:    Neurological:      General: No focal deficit present.      Mental Status: He is alert.   Psychiatric:         Mood and Affect: Mood normal.         Behavior: Behavior normal.        Physical Exam  Ears: Excessive cerumen noted in the right ear.  Musculoskeletal: Mild swelling noted on the right knee, with tenderness on the medial side.  Skin: Small laceration on the shin, with some redness noted after standing, but no open wound.    Result Review :          Results  Labs   - LDL cholesterol: LDL cholesterol was down   - Overall cholesterol: Overall cholesterol was down    Ear Cerumen Removal    Date/Time: 7/14/2025 4:35 PM    Performed by: Norma Kc PA  Authorized by: Norma Kc PA    Anesthesia:  Local Anesthetic: none  Ceruminolytics applied: Ceruminolytics applied prior to the procedure.  Location details: left ear and right ear  Patient tolerance: patient tolerated the procedure well with no immediate complications  Procedure type: irrigation   Sedation:  Patient sedated: no                Assessment and Plan    Diagnoses and all orders for this visit:    1. Acute pain of right knee (Primary)  - The right knee pain started a couple of weeks ago, likely due to walking on mixed gravel and possibly tweaking the knee.  - There is some swelling on the side of the knee, which has since reduced.  - he states that knee pain has almost completely resolved  - He takes nabumetone and Tylenol Arthritis Strength for pain management.  - He was advised to use a knee sleeve for support, especially since the condition seems to be improving.    2. Bilateral impacted cerumen  - He has a long history of wax buildup in his ears.  - Physical exam shows significant wax accumulation.  - An ear irrigation will be performed today  to remove the excess wax.  - He reports occasional difficulty hearing due to the buildup.    3. Laceration of skin of right eyelid and periocular area, subsequent encounter  - The skin laceration on the shin appears to be healing well.  - Physical exam shows the area is not open, though sometimes slightly redder after standing.  - He was advised to continue applying the prescribed ointment.  - Wearing compression socks may be contributing to the irritation.    Assessment & Plan                Follow Up   No follow-ups on file.  Patient was given instructions and counseling regarding his condition or for health maintenance advice. Please see specific information pulled into the AVS if appropriate.     Patient or patient representative verbalized consent for the use of Ambient Listening during the visit with  GABRIELLE Luna for chart documentation. 7/14/2025  10:01 EDT    GABRIELLE Luna  07/14/2025

## (undated) DEVICE — 3M™ MEDIPORE™ H SOFT CLOTH SURGICAL TAPE, 2863, 3 IN X 10 YD, 12/CASE: Brand: 3M™ MEDIPORE™

## (undated) DEVICE — TOOL 14BA60 LEGEND 14CM 6MM: Brand: MIDAS REX ™

## (undated) DEVICE — ANTIBACTERIAL UNDYED BRAIDED (POLYGLACTIN 910), SYNTHETIC ABSORBABLE SUTURE: Brand: COATED VICRYL

## (undated) DEVICE — GLV SURG PREMIERPRO MIC LTX PF SZ7 BRN

## (undated) DEVICE — GLV SURG PREMIERPRO MIC LTX PF SZ7.5 BRN

## (undated) DEVICE — JP PERF DRN SIL FLT 10MM FULL: Brand: CARDINAL HEALTH

## (undated) DEVICE — ELECTRD BLD EZ CLN STD 2.5IN

## (undated) DEVICE — SUT ETHLN 3/0 FS1 30IN 669H

## (undated) DEVICE — NEURO SPONGES: Brand: DEROYAL

## (undated) DEVICE — ADHS LIQ MASTISOL 2/3ML

## (undated) DEVICE — TOOL 14MH30 LEGEND 14CM 3MM: Brand: MIDAS REX ™

## (undated) DEVICE — GLV SURG PREMIERPRO MIC LTX PF SZ6.5 BRN

## (undated) DEVICE — STRAP POSTN KN/BDY FM 5X72IN DISP

## (undated) DEVICE — SPNG GZ WOVN 4X4IN 12PLY 10/BX STRL

## (undated) DEVICE — PK NEURO DISC 10

## (undated) DEVICE — HDRST INTUB GENTLETOUCH SLOT 7IN RT

## (undated) DEVICE — 3M™ STERI-DRAPE™ INSTRUMENT POUCH 1018: Brand: STERI-DRAPE™

## (undated) DEVICE — GAUZE,SPONGE,4"X4",16PLY,XRAY,STRL,LF: Brand: MEDLINE

## (undated) DEVICE — ACCY PA700 LUBRICANT DIFFUSER MR7 4 PACK: Brand: MIDAS REX

## (undated) DEVICE — BLANKT WARM UPPR/BDY ARM/OUT 57X196CM

## (undated) DEVICE — JACKSON-PRATT 100CC BULB RESERVOIR: Brand: CARDINAL HEALTH

## (undated) DEVICE — 3M™ STERI-STRIP™ REINFORCED ADHESIVE SKIN CLOSURES, R1547, 1/2 IN X 4 IN (12 MM X 100 MM), 6 STRIPS/ENVELOPE: Brand: 3M™ STERI-STRIP™

## (undated) DEVICE — ELECTRD BLD EZ CLN STD 4IN